# Patient Record
Sex: MALE | Race: WHITE | NOT HISPANIC OR LATINO | Employment: OTHER | URBAN - METROPOLITAN AREA
[De-identification: names, ages, dates, MRNs, and addresses within clinical notes are randomized per-mention and may not be internally consistent; named-entity substitution may affect disease eponyms.]

---

## 2017-01-18 ENCOUNTER — OFFICE VISIT (OUTPATIENT)
Dept: UROLOGY | Facility: CLINIC | Age: 67
End: 2017-01-18
Payer: MEDICARE

## 2017-01-18 VITALS — HEART RATE: 75 BPM | SYSTOLIC BLOOD PRESSURE: 147 MMHG | DIASTOLIC BLOOD PRESSURE: 82 MMHG

## 2017-01-18 DIAGNOSIS — N40.0 BENIGN PROSTATIC HYPERPLASIA, PRESENCE OF LOWER URINARY TRACT SYMPTOMS UNSPECIFIED, UNSPECIFIED MORPHOLOGY: ICD-10-CM

## 2017-01-18 DIAGNOSIS — N52.39 POST-PROCEDURAL ERECTILE DYSFUNCTION, UNSPECIFIED TYPE: ICD-10-CM

## 2017-01-18 DIAGNOSIS — C64.1 RENAL CELL CARCINOMA, RIGHT: ICD-10-CM

## 2017-01-18 DIAGNOSIS — R97.20 ELEVATED PROSTATE SPECIFIC ANTIGEN (PSA): Primary | ICD-10-CM

## 2017-01-18 DIAGNOSIS — N52.9 ERECTILE DYSFUNCTION, UNSPECIFIED ERECTILE DYSFUNCTION TYPE: ICD-10-CM

## 2017-01-18 LAB
BILIRUB SERPL-MCNC: NORMAL MG/DL
BLOOD URINE, POC: NORMAL
COLOR, POC UA: YELLOW
GLUCOSE UR QL STRIP: NORMAL
KETONES UR QL STRIP: NORMAL
LEUKOCYTE ESTERASE URINE, POC: NORMAL
NITRITE, POC UA: NORMAL
PH, POC UA: 5
PROTEIN, POC: NORMAL
SPECIFIC GRAVITY, POC UA: 1.02
UROBILINOGEN, POC UA: NORMAL

## 2017-01-18 PROCEDURE — 81002 URINALYSIS NONAUTO W/O SCOPE: CPT | Mod: S$GLB,,, | Performed by: UROLOGY

## 2017-01-18 PROCEDURE — 1160F RVW MEDS BY RX/DR IN RCRD: CPT | Mod: S$GLB,,, | Performed by: UROLOGY

## 2017-01-18 PROCEDURE — 99214 OFFICE O/P EST MOD 30 MIN: CPT | Mod: 25,S$GLB,, | Performed by: UROLOGY

## 2017-01-18 PROCEDURE — 1159F MED LIST DOCD IN RCRD: CPT | Mod: S$GLB,,, | Performed by: UROLOGY

## 2017-01-18 PROCEDURE — 99999 PR PBB SHADOW E&M-EST. PATIENT-LVL III: CPT | Mod: PBBFAC,,, | Performed by: UROLOGY

## 2017-01-18 PROCEDURE — 1157F ADVNC CARE PLAN IN RCRD: CPT | Mod: S$GLB,,, | Performed by: UROLOGY

## 2017-01-18 PROCEDURE — 1126F AMNT PAIN NOTED NONE PRSNT: CPT | Mod: S$GLB,,, | Performed by: UROLOGY

## 2017-01-18 RX ORDER — SILDENAFIL CITRATE 20 MG/1
20 TABLET ORAL AS DIRECTED
Qty: 30 TABLET | Refills: 6 | Status: SHIPPED | OUTPATIENT
Start: 2017-01-18 | End: 2019-01-01

## 2017-01-18 RX ORDER — HYDROCODONE BITARTRATE AND ACETAMINOPHEN 7.5; 325 MG/1; MG/1
TABLET ORAL
COMMUNITY
Start: 2016-12-20 | End: 2018-01-09 | Stop reason: SDUPTHER

## 2017-01-18 RX ORDER — BETAMETHASONE DIPROPIONATE 0.5 MG/G
CREAM TOPICAL
Refills: 0 | COMMUNITY
Start: 2016-11-04 | End: 2019-01-01

## 2017-01-18 NOTE — MR AVS SNAPSHOT
[facility]  [address], [address]  [place] [address]  Phone: [phone]                  Raymundo Richard   2017 9:30 AM   Office Visit    Description:  Male : 1950   Provider:  Ne Green MD   Department:  [facility]           Reason for Visit     Elevated PSA           Diagnoses this Visit        Comments    Elevated prostate specific antigen (PSA)    -  Primary     Renal cell carcinoma, right         Solitary kidney                To Do List           Future Appointments        Provider Department Dept Phone    2017 12:00 PM [facility] CT2 LIMIT 500 LBS [facility] [phone]      Goals (5 Years of Data)     None       These Medications        Disp Refills Start End    sildenafil (REVATIO) 20 mg Tab 30 tablet 6 2017    Take 1 tablet (20 mg total) by mouth as directed. - Oral    Pharmacy: [facility]/pharmacy #[id number] - [place] - [address] Ph #: [phone]         [facility]     [facility] On Call Nurse Care Line -  Assistance  Registered nurses in the [facility] provide clinical advisement, health education, appointment booking, and other advisory services.  Call for this free service at [phone].             Medications           Message regarding Medications     Verify the changes and/or additions to your medication regime listed below are the same as discussed with your clinician today.  If any of these changes or additions are incorrect, please notify your healthcare provider.        START taking these NEW medications        Refills    sildenafil (REVATIO) 20 mg Tab 6    Sig: Take 1 tablet (20 mg total) by mouth as directed.    Class: Print    Route: Oral           Verify that the below list of medications is an accurate representation of the medications you are currently taking.  If none reported, the list may be blank. If incorrect, please contact your healthcare provider. Carry this  list with you in case of emergency.           Current Medications     betamethasone dipropionate (DIPROLENE) 0.05 % cream APPLY TO HANDS TWICE A DAY AS NEEDED    hydrocodone-acetaminophen 7.5-325mg (NORCO) 7.5-325 mg per tablet     pantoprazole (PROTONIX) 40 MG tablet     tamsulosin (FLOMAX) 0.4 mg Cp24 TAKE 1 CAPSULE (0.4 MG TOTAL) BY MOUTH ONCE DAILY.    sildenafil (REVATIO) 20 mg Tab Take 1 tablet (20 mg total) by mouth as directed.           Clinical Reference Information           Vital Signs - Last Recorded  Most recent update: 1/18/2017  9:30 AM by Josy Coyne MA    BP Pulse                (!) 147/82 75          Blood Pressure          Most Recent Value    BP  (!)  147/82      Allergies as of 1/18/2017     Other      Immunizations Administered on Date of Encounter - 1/18/2017     None      Orders Placed During Today's Visit      Normal Orders This Visit    POCT URINE DIPSTICK WITHOUT MICROSCOPE     Future Labs/Procedures Expected by Expires    CBC auto differential  1/18/2017 3/19/2018    Comprehensive metabolic panel  1/18/2017 3/19/2018    CT Abdomen Pelvis W Wo Contrast  1/18/2017 1/18/2018    PSA, total and free  1/18/2017 3/19/2018         1/18/2017 10:24 AM - Josy Coyne MA      Component Results     Component    Color, UA    yellow    Spec Grav, UA    1.020    pH, UA    5.0    WBC, UA    neg    Nitrite, UA    neg    Protein, UA    neg    Glucose, UA    neg    Ketones, UA    neg    Urobilinogen, UA    neg    Bilirubin, UA    neg    Blood, UA    neg

## 2017-01-18 NOTE — PROGRESS NOTES
OFFICE NOTE     CHIEF COMPLAINT:  Renal cell carcinoma, BPH, elevated PSA, abnormal imaging of   the bladder.    HISTORY OF PRESENT ILLNESS:  This 66-year-old male returns for routine recheck.    He had undergone cystoscopy on 09/13/2016 when he   was found to have some thickening of the bladder wall on the CT scan.    Cystoscopy did reveal trilobar prostatic hyperplasia, but no evidence of any   bladder lesions.  The patient also has a history of BPH for which he continues   to take Flomax 0.4 mg p.o. daily and he also has a history of renal cell   carcinoma for which he underwent a right radical nephrectomy in August 2013.    Followup evaluations so far have shown no evidence of any tumor recurrence.  He   also has a history of elevated PSA for which he had undergone a prostate   ultrasound with biopsy in 2012 and was found to be no evidence of malignancy and   he did undergo an MRI of the prostate that showed a low probability of   developing prostatic carcinoma.  His most recent PSA was 8.7 on 06/10/2016 with   a free PSA of 22%.  The patient also is being treated for erectile dysfunction,   for which he had been taking Viagra in the past, but he is requesting Revatio as   this is less costly and is affordable for him.    MEDICAL HISTORY UPDATE:  Reveals no change in his general health since his last   visit.    PHYSICAL EXAMINATION:  ABDOMEN:  Soft, benign and nontender.  No masses.  Well-healed right upper   quadrant surgical scar.  EXTERNAL GENITAL:  Normal phallus with adequate meatus.  Testes descended and   feel normal.  No scrotal masses.  RECTAL:  Reveals a 30 g smooth prostate.  No nodules.  Normal sphincter tone.    UA negative with pH 5.0.    FINAL IMPRESSION:  Renal cell carcinoma status post right radical nephrectomy   with solitary left kidney, benign prostatic hypertrophy, elevated PSA, erectile   dysfunction.    RECOMMENDATIONS:  CT scan of abdomen and pelvis, CMP, CBC, PSA, free and total.     Continue with Flomax 0.4 mg p.o. daily and Revatio 20 mg for management of his   erectile dysfunction and recommend that he take anywhere up to five tablets at a   time, which will be equivalent 100 mg of Viagra.  He stated he understood and   agreed and we will contact him with the imaging and lab results.      KHUSHI  dd: 01/18/2017 16:47:53 (CST)  td: 01/19/2017 01:28:31 (CST)  Doc ID   #[id number]  Job ID #[id number]    CC:

## 2017-01-19 ENCOUNTER — LAB VISIT (OUTPATIENT)
Dept: LAB | Facility: HOSPITAL | Age: 67
End: 2017-01-19
Attending: UROLOGY
Payer: MEDICARE

## 2017-01-19 DIAGNOSIS — C64.1 RENAL CELL CARCINOMA, RIGHT: ICD-10-CM

## 2017-01-19 DIAGNOSIS — R97.20 ELEVATED PROSTATE SPECIFIC ANTIGEN (PSA): ICD-10-CM

## 2017-01-19 LAB
ALBUMIN SERPL BCP-MCNC: 3.9 G/DL
ALP SERPL-CCNC: 55 U/L
ALT SERPL W/O P-5'-P-CCNC: 26 U/L
ANION GAP SERPL CALC-SCNC: 9 MMOL/L
AST SERPL-CCNC: 24 U/L
BASOPHILS # BLD AUTO: 0 K/UL
BASOPHILS NFR BLD: 0.7 %
BILIRUB SERPL-MCNC: 0.8 MG/DL
BUN SERPL-MCNC: 23 MG/DL
CALCIUM SERPL-MCNC: 9.6 MG/DL
CHLORIDE SERPL-SCNC: 107 MMOL/L
CO2 SERPL-SCNC: 23 MMOL/L
CREAT SERPL-MCNC: 1.3 MG/DL
DIFFERENTIAL METHOD: ABNORMAL
EOSINOPHIL # BLD AUTO: 0.3 K/UL
EOSINOPHIL NFR BLD: 4 %
ERYTHROCYTE [DISTWIDTH] IN BLOOD BY AUTOMATED COUNT: 13 %
EST. GFR  (AFRICAN AMERICAN): >60 ML/MIN/1.73 M^2
EST. GFR  (NON AFRICAN AMERICAN): 57 ML/MIN/1.73 M^2
GLUCOSE SERPL-MCNC: 82 MG/DL
HCT VFR BLD AUTO: 44.5 %
HGB BLD-MCNC: 15 G/DL
LYMPHOCYTES # BLD AUTO: 2.4 K/UL
LYMPHOCYTES NFR BLD: 36 %
MCH RBC QN AUTO: 29.8 PG
MCHC RBC AUTO-ENTMCNC: 33.7 %
MCV RBC AUTO: 89 FL
MONOCYTES # BLD AUTO: 0.7 K/UL
MONOCYTES NFR BLD: 10.4 %
NEUTROPHILS # BLD AUTO: 3.2 K/UL
NEUTROPHILS NFR BLD: 48.9 %
PLATELET # BLD AUTO: 218 K/UL
PMV BLD AUTO: 7.9 FL
POTASSIUM SERPL-SCNC: 4.3 MMOL/L
PROSTATE SPECIFIC ANTIGEN, TOTAL: 9.3 NG/ML
PROT SERPL-MCNC: 7.3 G/DL
PSA FREE MFR SERPL: 22.9 %
PSA FREE SERPL-MCNC: 2.13 NG/ML
RBC # BLD AUTO: 5.03 M/UL
SODIUM SERPL-SCNC: 139 MMOL/L
WBC # BLD AUTO: 6.6 K/UL

## 2017-01-19 PROCEDURE — 80053 COMPREHEN METABOLIC PANEL: CPT

## 2017-01-19 PROCEDURE — 84153 ASSAY OF PSA TOTAL: CPT

## 2017-01-19 PROCEDURE — 36415 COLL VENOUS BLD VENIPUNCTURE: CPT

## 2017-01-19 PROCEDURE — 85025 COMPLETE CBC W/AUTO DIFF WBC: CPT

## 2017-01-23 ENCOUNTER — HOSPITAL ENCOUNTER (OUTPATIENT)
Dept: RADIOLOGY | Facility: HOSPITAL | Age: 67
Discharge: HOME | End: 2017-01-23
Attending: NURSE PRACTITIONER
Payer: MEDICARE

## 2017-01-23 ENCOUNTER — HOSPITAL ENCOUNTER (OUTPATIENT)
Dept: RADIOLOGY | Facility: HOSPITAL | Age: 67
Discharge: HOME | End: 2017-01-23
Attending: UROLOGY
Payer: MEDICARE

## 2017-01-23 ENCOUNTER — TELEPHONE (OUTPATIENT)
Dept: UROLOGY | Facility: CLINIC | Age: 67
End: 2017-01-23

## 2017-01-23 DIAGNOSIS — M25.562 ACUTE PAIN OF LEFT KNEE: Primary | ICD-10-CM

## 2017-01-23 DIAGNOSIS — M25.562 ACUTE PAIN OF LEFT KNEE: ICD-10-CM

## 2017-01-23 DIAGNOSIS — C64.1 RENAL CELL CARCINOMA, RIGHT: ICD-10-CM

## 2017-01-23 DIAGNOSIS — R97.20 ELEVATED PROSTATE SPECIFIC ANTIGEN (PSA): ICD-10-CM

## 2017-01-23 PROCEDURE — 73560 X-RAY EXAM OF KNEE 1 OR 2: CPT | Mod: TC,LT

## 2017-01-23 PROCEDURE — 25500020 PHARM REV CODE 255

## 2017-01-23 PROCEDURE — 25500020 PHARM REV CODE 255: Performed by: UROLOGY

## 2017-01-23 PROCEDURE — 74178 CT ABD&PLV WO CNTR FLWD CNTR: CPT | Mod: 26,,, | Performed by: RADIOLOGY

## 2017-01-23 PROCEDURE — 74178 CT ABD&PLV WO CNTR FLWD CNTR: CPT | Mod: TC

## 2017-01-23 PROCEDURE — 73560 X-RAY EXAM OF KNEE 1 OR 2: CPT | Mod: 26,LT,, | Performed by: RADIOLOGY

## 2017-01-23 RX ADMIN — IOHEXOL 75 ML: 350 INJECTION, SOLUTION INTRAVENOUS at 01:01

## 2017-01-23 RX ADMIN — IOHEXOL 30 ML: 350 INJECTION, SOLUTION INTRAVENOUS at 02:01

## 2017-01-23 NOTE — TELEPHONE ENCOUNTER
----- Message from LUIS CARLOS Bolden sent at 1/23/2017  2:44 PM CST -----  Please call and inform patient small joint effusion and mild degenerative changes

## 2017-01-25 DIAGNOSIS — R91.1 LUNG NODULE: Primary | ICD-10-CM

## 2017-01-26 ENCOUNTER — HOSPITAL ENCOUNTER (OUTPATIENT)
Dept: RADIOLOGY | Facility: HOSPITAL | Age: 67
Discharge: HOME | End: 2017-01-26
Attending: UROLOGY
Payer: MEDICARE

## 2017-01-26 ENCOUNTER — TELEPHONE (OUTPATIENT)
Dept: UROLOGY | Facility: CLINIC | Age: 67
End: 2017-01-26

## 2017-01-26 DIAGNOSIS — R91.1 LUNG NODULE: ICD-10-CM

## 2017-01-26 PROCEDURE — 71250 CT THORAX DX C-: CPT | Mod: TC

## 2017-01-26 PROCEDURE — 71250 CT THORAX DX C-: CPT | Mod: 26,,, | Performed by: RADIOLOGY

## 2017-01-26 NOTE — TELEPHONE ENCOUNTER
----- Message from Cristina Mcdermott sent at 1/26/2017  2:05 PM CST -----  Contact: VERENICE FROM DR. Lara office [phone]  Verenice called  From Dr. Lara office and asked for the office notes from the visit yesterday and his PSA levels as well  Fax to [phone]

## 2017-05-18 ENCOUNTER — TELEPHONE (OUTPATIENT)
Dept: UROLOGY | Facility: CLINIC | Age: 67
End: 2017-05-18

## 2017-05-18 NOTE — TELEPHONE ENCOUNTER
----- Message from Mary Gonzales sent at 5/18/2017 11:23 AM CDT -----  Dr. Rizvi office called to speak to Dr. Arvizu's office. Nurse answered the phone but said it should have been for Dr. Green. She took the call at that time so I did not get the chance to get the phone number or reason for the call.

## 2017-07-13 ENCOUNTER — TELEPHONE (OUTPATIENT)
Dept: UROLOGY | Facility: CLINIC | Age: 67
End: 2017-07-13

## 2017-07-13 ENCOUNTER — OFFICE VISIT (OUTPATIENT)
Dept: UROLOGY | Facility: CLINIC | Age: 67
End: 2017-07-13
Payer: MEDICARE

## 2017-07-13 ENCOUNTER — LAB VISIT (OUTPATIENT)
Dept: LAB | Facility: HOSPITAL | Age: 67
End: 2017-07-13
Attending: UROLOGY
Payer: MEDICARE

## 2017-07-13 VITALS
WEIGHT: 166.44 LBS | BODY MASS INDEX: 26.75 KG/M2 | TEMPERATURE: 98 F | DIASTOLIC BLOOD PRESSURE: 83 MMHG | RESPIRATION RATE: 18 BRPM | HEIGHT: 66 IN | HEART RATE: 70 BPM | SYSTOLIC BLOOD PRESSURE: 136 MMHG

## 2017-07-13 DIAGNOSIS — C64.1 RENAL CANCER, RIGHT: ICD-10-CM

## 2017-07-13 DIAGNOSIS — N52.9 ERECTILE DYSFUNCTION, UNSPECIFIED ERECTILE DYSFUNCTION TYPE: ICD-10-CM

## 2017-07-13 DIAGNOSIS — R97.20 ELEVATED PROSTATE SPECIFIC ANTIGEN (PSA): ICD-10-CM

## 2017-07-13 DIAGNOSIS — R97.20 ELEVATED PROSTATE SPECIFIC ANTIGEN (PSA): Primary | ICD-10-CM

## 2017-07-13 DIAGNOSIS — N40.1 BENIGN NON-NODULAR PROSTATIC HYPERPLASIA WITH LOWER URINARY TRACT SYMPTOMS: ICD-10-CM

## 2017-07-13 LAB
ALBUMIN SERPL BCP-MCNC: 4.1 G/DL
ALP SERPL-CCNC: 64 U/L
ALT SERPL W/O P-5'-P-CCNC: 14 U/L
ANION GAP SERPL CALC-SCNC: 10 MMOL/L
AST SERPL-CCNC: 23 U/L
BASOPHILS # BLD AUTO: 0 K/UL
BASOPHILS NFR BLD: 0.4 %
BILIRUB SERPL-MCNC: 0.9 MG/DL
BILIRUB SERPL-MCNC: NORMAL MG/DL
BLOOD URINE, POC: NORMAL
BUN SERPL-MCNC: 15 MG/DL
CALCIUM SERPL-MCNC: 9.9 MG/DL
CHLORIDE SERPL-SCNC: 106 MMOL/L
CO2 SERPL-SCNC: 24 MMOL/L
COLOR, POC UA: YELLOW
COMPLEXED PSA SERPL-MCNC: 15.4 NG/ML
CREAT SERPL-MCNC: 1.3 MG/DL
DIFFERENTIAL METHOD: ABNORMAL
EOSINOPHIL # BLD AUTO: 0.2 K/UL
EOSINOPHIL NFR BLD: 2.7 %
ERYTHROCYTE [DISTWIDTH] IN BLOOD BY AUTOMATED COUNT: 13.3 %
EST. GFR  (AFRICAN AMERICAN): >60 ML/MIN/1.73 M^2
EST. GFR  (NON AFRICAN AMERICAN): 56 ML/MIN/1.73 M^2
GLUCOSE SERPL-MCNC: 95 MG/DL
GLUCOSE UR QL STRIP: NORMAL
HCT VFR BLD AUTO: 47.1 %
HGB BLD-MCNC: 15.9 G/DL
KETONES UR QL STRIP: NORMAL
LEUKOCYTE ESTERASE URINE, POC: NORMAL
LYMPHOCYTES # BLD AUTO: 2.3 K/UL
LYMPHOCYTES NFR BLD: 30.3 %
MCH RBC QN AUTO: 29.8 PG
MCHC RBC AUTO-ENTMCNC: 33.7 %
MCV RBC AUTO: 88 FL
MONOCYTES # BLD AUTO: 0.6 K/UL
MONOCYTES NFR BLD: 7.6 %
NEUTROPHILS # BLD AUTO: 4.5 K/UL
NEUTROPHILS NFR BLD: 59 %
NITRITE, POC UA: NORMAL
PH, POC UA: 6
PLATELET # BLD AUTO: 240 K/UL
PMV BLD AUTO: 8 FL
POTASSIUM SERPL-SCNC: 4.4 MMOL/L
PROT SERPL-MCNC: 8 G/DL
PROTEIN, POC: NORMAL
RBC # BLD AUTO: 5.33 M/UL
SODIUM SERPL-SCNC: 140 MMOL/L
SPECIFIC GRAVITY, POC UA: 1.01
UROBILINOGEN, POC UA: NORMAL
WBC # BLD AUTO: 7.6 K/UL

## 2017-07-13 PROCEDURE — 36415 COLL VENOUS BLD VENIPUNCTURE: CPT

## 2017-07-13 PROCEDURE — 1159F MED LIST DOCD IN RCRD: CPT | Mod: S$GLB,,, | Performed by: UROLOGY

## 2017-07-13 PROCEDURE — 1126F AMNT PAIN NOTED NONE PRSNT: CPT | Mod: S$GLB,,, | Performed by: UROLOGY

## 2017-07-13 PROCEDURE — 99999 PR PBB SHADOW E&M-EST. PATIENT-LVL IV: CPT | Mod: PBBFAC,,, | Performed by: UROLOGY

## 2017-07-13 PROCEDURE — 99214 OFFICE O/P EST MOD 30 MIN: CPT | Mod: 25,S$GLB,, | Performed by: UROLOGY

## 2017-07-13 PROCEDURE — 81002 URINALYSIS NONAUTO W/O SCOPE: CPT | Mod: S$GLB,,, | Performed by: UROLOGY

## 2017-07-13 PROCEDURE — 84153 ASSAY OF PSA TOTAL: CPT

## 2017-07-13 PROCEDURE — 85025 COMPLETE CBC W/AUTO DIFF WBC: CPT

## 2017-07-13 PROCEDURE — 80053 COMPREHEN METABOLIC PANEL: CPT

## 2017-07-13 NOTE — TELEPHONE ENCOUNTER
----- Message from Yamile Frazier sent at 7/13/2017  2:04 PM CDT -----  Contact: pt  Pt returning your call..[phone] (gehd)

## 2017-07-13 NOTE — TELEPHONE ENCOUNTER
----- Message from Michelle Avilez LPN sent at 7/13/2017  1:34 PM CDT -----  Contact: pt  Pt requesting that you return call to him . Please return call   ----- Message -----  From: Elder Sandy  Sent: 7/13/2017  12:14 PM  To: Norma Oilvia Staff    Pt is requesting a callback about his prescription   Call Back#[phone]  Thanks

## 2017-07-13 NOTE — TELEPHONE ENCOUNTER
----- Message from Olivia Almonte sent at 7/13/2017  1:52 PM CDT -----  Contact: self  Placed call to pod, patient miss call from your office please call back at [phone] (sfos)

## 2017-07-13 NOTE — TELEPHONE ENCOUNTER
I spoke with the pt and he stated the Austin is too expensive and he would like something else. His friend told him about an injectable medication of which he is going to call back tomorrow with the name. I advised him that would be fine.

## 2017-07-16 NOTE — PROGRESS NOTES
OFFICE NOTE    CHIEF COMPLAINT:  Renal cell carcinoma, status post right radical nephrectomy,   solitary left kidney, BPH, and history of elevated PSA.    HISTORY OF PRESENT ILLNESS:  This 67-year-old male returns for routine recheck.    He has a history of renal cell carcinoma for which he underwent a right radical   nephrectomy in 2013 and has shown no evidence of recurrence since then.  He   also has a history of elevated PSA for which he has undergone a prostate   ultrasound biopsy in 2012 and found to have no evidence of any malignancy and   subsequent MRI of the prostate showed a low probability of him developing a   prostate cancer.  The patient had undergone evaluation with cystoscopy on   09/13/2016 since on the CT scan, there was found to be some thickening of the   bladder wall, but the cystoscopic examination on 09/13/2016 revealed trilobar   prostatic hyperplasia and no evidence of any bladder lesions.  In terms of his   BPH, he continues to take with Flomax 0.4 mg p.o. daily.  In terms of his   erectile dysfunction, he states that the Revatio is no longer effective, he is   not on any medication at this time, we did discuss other treatment options, and he   is not on any PDE5 inhibitors at this time.    MEDICAL HISTORY UPDATE:  Reveals that he is being followed by Dr. Lara for his   pulmonary nodule.    PHYSICAL EXAMINATION:  ABDOMEN:  Soft, benign and nontender.  No masses.  No hernias or organomegaly.    Well-healed right upper quadrant surgical scar.  EXTERNAL GENITAL:  Normal phallus with adequate meatus.  Testes descended and   feel normal.  No scrotal masses.  RECTAL:  Reveals 30 g smooth prostate.  No nodules.  Normal sphincter tone.    UA negative with pH 6.0.    His last PSA was 9.3 on 01/19/2017.    FINAL IMPRESSION:  Right renal cell carcinoma with status post right radical   nephrectomy, solitary left kidney, BPH, erectile dysfunction and history of   elevated PSA.    RECOMMENDATIONS:  We  will obtain a CT scan of the chest, abdomen and pelvis,   CBC, CMP and also PSA.  He will otherwise continue with the Flomax.    In terms of further management of the erectile dysfunction, further treatment   options were discussed with the patient and the patient decided that he would   want to try the MUSE.  He was instructed in administration of the MUSE pellet   and we in fact did try in the office today with a 250 mcg dosage and after   observation following 10 to 15 minutes after administering the treatment, there   was noted to be approximately 60% rigidity and it was discussed with the patient   that he may want to try the higher dosage at home and notify us of the response   and he was also informed to be aware of the possibility of prolonged erections   resulting in priapism, for which he will need emergency treatment if he were to   develop it and he stated he understood and agreed and he was given literature   information concerning this.  We will contact him with the results of the lab   work and the imaging studies and will notify us of the response to the MUSE.      GUY  dd: 07/16/2017 15:02:25 (CDT)  td: 07/16/2017 23:53:10 (LIANT)  Doc ID   #[id number]  Job ID #[id number]    CC:

## 2017-07-18 ENCOUNTER — HOSPITAL ENCOUNTER (OUTPATIENT)
Dept: RADIOLOGY | Facility: HOSPITAL | Age: 67
Discharge: HOME | End: 2017-07-18
Attending: UROLOGY
Payer: MEDICARE

## 2017-07-18 DIAGNOSIS — R97.20 ELEVATED PROSTATE SPECIFIC ANTIGEN (PSA): ICD-10-CM

## 2017-07-18 DIAGNOSIS — C64.1 RENAL CANCER, RIGHT: ICD-10-CM

## 2017-07-18 PROCEDURE — 74176 CT ABD & PELVIS W/O CONTRAST: CPT | Mod: TC

## 2017-07-18 PROCEDURE — 74176 CT ABD & PELVIS W/O CONTRAST: CPT | Mod: 26,,, | Performed by: RADIOLOGY

## 2017-07-18 PROCEDURE — 25500020 PHARM REV CODE 255

## 2017-07-18 PROCEDURE — 71250 CT THORAX DX C-: CPT | Mod: 26,,, | Performed by: RADIOLOGY

## 2017-07-18 PROCEDURE — 71250 CT THORAX DX C-: CPT | Mod: TC

## 2017-07-18 RX ADMIN — IOHEXOL 30 ML: 350 INJECTION, SOLUTION INTRAVENOUS at 08:07

## 2017-07-18 NOTE — TELEPHONE ENCOUNTER
----- Message from Nickolas Peters sent at 7/18/2017  3:17 PM CDT -----  Contact: self- [phone]  Patient states he returning the nurse phone call.Thanks!

## 2017-07-19 ENCOUNTER — TELEPHONE (OUTPATIENT)
Dept: UROLOGY | Facility: CLINIC | Age: 67
End: 2017-07-19

## 2017-07-19 DIAGNOSIS — R97.20 ELEVATED PROSTATE SPECIFIC ANTIGEN (PSA): Primary | ICD-10-CM

## 2017-07-19 NOTE — TELEPHONE ENCOUNTER
Patient states he wants to speak with Josy about a prescription that was supposed to be sent to pharmacy.

## 2017-07-19 NOTE — TELEPHONE ENCOUNTER
----- Message from Lois Woodard sent at 7/19/2017 11:31 AM CDT -----  Contact: Patient  Patient returning call. Please call back at [phone]. Thanks,

## 2017-07-20 NOTE — TELEPHONE ENCOUNTER
----- Message from Dara Briggs sent at 7/20/2017  8:11 AM CDT -----  Contact: self  Patient would like to speak to Josy again   He has questions regarding his medication and PSA    Please call      Thanks

## 2017-07-20 NOTE — TELEPHONE ENCOUNTER
The pt was advised the Trimix has been sent to his pharmacy and please call back if the recommended dosage doesn't work. He verbalized understanding. Please  Sign orders for repeat PSA.

## 2017-07-20 NOTE — TELEPHONE ENCOUNTER
I spoke with the pt and he states he would like to take Prostate 2.4 in view of the elevated PSA. Please advise.

## 2017-07-25 DIAGNOSIS — R97.20 ELEVATED PSA: ICD-10-CM

## 2017-07-25 DIAGNOSIS — N41.9 PROSTATITIS, UNSPECIFIED PROSTATITIS TYPE: Primary | ICD-10-CM

## 2017-07-25 RX ORDER — SULFAMETHOXAZOLE AND TRIMETHOPRIM 800; 160 MG/1; MG/1
1 TABLET ORAL 2 TIMES DAILY
Qty: 42 TABLET | Refills: 0 | Status: SHIPPED | OUTPATIENT
Start: 2017-07-25 | End: 2017-11-02 | Stop reason: ALTCHOICE

## 2017-07-25 NOTE — TELEPHONE ENCOUNTER
Patient was advised last week that his PSA was elevated.  At that time he had no symptoms. Patient now reports that he is having some trouble urinating and dysuria.    He would like to have antibiotics sent to [facility] on [address].    Please advised.

## 2017-07-25 NOTE — TELEPHONE ENCOUNTER
----- Message from Cristina Mcdermott sent at 7/25/2017  1:43 PM CDT -----  Contact: pt [phone]  Call placed to pod patient is returning the nurse call back.

## 2017-11-02 ENCOUNTER — OFFICE VISIT (OUTPATIENT)
Dept: UROLOGY | Facility: CLINIC | Age: 67
End: 2017-11-02
Payer: MEDICARE

## 2017-11-02 VITALS
SYSTOLIC BLOOD PRESSURE: 131 MMHG | RESPIRATION RATE: 18 BRPM | BODY MASS INDEX: 24.45 KG/M2 | TEMPERATURE: 98 F | DIASTOLIC BLOOD PRESSURE: 80 MMHG | HEIGHT: 66 IN | WEIGHT: 152.13 LBS | HEART RATE: 70 BPM

## 2017-11-02 DIAGNOSIS — R30.0 DYSURIA: Primary | ICD-10-CM

## 2017-11-02 DIAGNOSIS — R97.20 ELEVATED PROSTATE SPECIFIC ANTIGEN (PSA): ICD-10-CM

## 2017-11-02 DIAGNOSIS — R33.9 INCOMPLETE EMPTYING OF BLADDER: ICD-10-CM

## 2017-11-02 DIAGNOSIS — N40.0 BENIGN PROSTATIC HYPERPLASIA, UNSPECIFIED WHETHER LOWER URINARY TRACT SYMPTOMS PRESENT: ICD-10-CM

## 2017-11-02 DIAGNOSIS — N41.9 PROSTATITIS, UNSPECIFIED PROSTATITIS TYPE: ICD-10-CM

## 2017-11-02 DIAGNOSIS — C64.1 RENAL CELL CARCINOMA OF RIGHT KIDNEY: ICD-10-CM

## 2017-11-02 LAB
BILIRUB SERPL-MCNC: NORMAL MG/DL
BLOOD URINE, POC: NORMAL
COLOR, POC UA: YELLOW
GLUCOSE UR QL STRIP: NORMAL
KETONES UR QL STRIP: NORMAL
LEUKOCYTE ESTERASE URINE, POC: NORMAL
NITRITE, POC UA: NORMAL
PH, POC UA: 6
PROTEIN, POC: NORMAL
SPECIFIC GRAVITY, POC UA: 1.01
UROBILINOGEN, POC UA: NORMAL

## 2017-11-02 PROCEDURE — 51798 US URINE CAPACITY MEASURE: CPT | Mod: S$GLB,,, | Performed by: UROLOGY

## 2017-11-02 PROCEDURE — 99214 OFFICE O/P EST MOD 30 MIN: CPT | Mod: 25,S$GLB,, | Performed by: UROLOGY

## 2017-11-02 PROCEDURE — 99999 PR PBB SHADOW E&M-EST. PATIENT-LVL III: CPT | Mod: PBBFAC,,, | Performed by: UROLOGY

## 2017-11-02 PROCEDURE — 81002 URINALYSIS NONAUTO W/O SCOPE: CPT | Mod: S$GLB,,, | Performed by: UROLOGY

## 2017-11-02 RX ORDER — ALFUZOSIN HYDROCHLORIDE 10 MG/1
10 TABLET, EXTENDED RELEASE ORAL
Qty: 30 TABLET | Refills: 11 | Status: SHIPPED | OUTPATIENT
Start: 2017-11-02 | End: 2018-04-10 | Stop reason: ALTCHOICE

## 2017-11-02 RX ORDER — SULFAMETHOXAZOLE AND TRIMETHOPRIM 800; 160 MG/1; MG/1
1 TABLET ORAL 2 TIMES DAILY
Qty: 42 TABLET | Refills: 0 | Status: SHIPPED | OUTPATIENT
Start: 2017-11-02 | End: 2017-11-12

## 2017-11-02 NOTE — PROGRESS NOTES
OFFICE NOTE    CHIEF COMPLAINT:  Renal cell carcinoma status post right radical nephrectomy,   solitary left kidney, history of elevated PSA, BPH, and lower urinary tract   symptoms.    HISTORY OF PRESENT ILLNESS:  This 67-year-old male returns for routine recheck.    He has a history of renal cell carcinoma for which he underwent a right radical   nephrectomy in 2013, so far has shown no evidence of any recurrences.  He did   undergo a CT scan of the abdomen and pelvis, most recently on 07/19/2017, which   revealed some pulmonary nodules, although unchanged from prior study, there was   a solitary left kidney with a 1 mm nonobstructing renal stones and no evidence   of any metastatic disease, but there was also enlarged prostate with some   bladder wall thickening as was noted on the prior CT scan.  The patient had   undergone cystoscopy on 09/13/2016 in view of the thickening of the bladder wall   and the only finding was that of trilobar prostatic hyperplasia.  No evidence   of any bladder lesions.  The patient also has a history of elevated PSA for   which he has undergone prostate ultrasound and biopsy in 2012 with no evidence   of any malignancy.  A subsequent MRI of the prostate showed a low probability of   him developing prostatic carcinoma and is being continued to be managed   conservatively, although his most recent PSA had risen fairly considerably to   15.4 on 07/13/2017 compared to 7.6, a year prior.  The patient will continue to   take Flomax for management of his BPH with which overall he has been doing quite   well until the lower tract symptoms over the past several months of dysuria,   slowing of the stream, and fever had developed suggestive of possible   prostatitis.    OTHER MEDICAL HISTORY UPDATE:  Reveals that he did see Dr. Diop, pulmonologist   for pulmonary nodules and is being managed with observation.    PHYSICAL EXAMINATION:  ABDOMEN:  Soft, benign and nontender.  Well-healed right  upper quadrant surgical   scar.  Bulging suggestive of possible right inguinal hernia for which he will be   following up with his general surgeon.  EXTERNAL GENITAL:  Normal phallus with adequate meatus.  Testes descended and   feel normal.  No scrotal masses.  RECTAL:  Reveals a 25 to 30 g somewhat boggy prostate, although no nodules.    Normal sphincter tone.    Bladder scan revealed 109 mL of residual urine.    UA today was negative with pH 6.0.    FINAL IMPRESSION:  Renal cell carcinoma, status post right radical nephrectomy,   solitary left kidney, small left renal calculus, benign prostatic hypertrophy,   prostatitis, and incomplete bladder emptying.    RECOMMENDATIONS:  PSA for which he is due for recheck.  Trial on Uroxatral 10 mg   p.o. daily in place of Flomax and the patient also was placed on Bactrim DS   p.o. b.i.d. with routine recheck in one month to observe response to the   treatment.      KHUSHI  dd: 11/02/2017 17:00:50 (CDT)  td: 11/03/2017 02:53:54 (CDT)  Doc ID   #[id number]  Job ID #[id number]    CC:

## 2017-11-27 ENCOUNTER — LAB VISIT (OUTPATIENT)
Dept: LAB | Facility: HOSPITAL | Age: 67
End: 2017-11-27
Attending: UROLOGY
Payer: MEDICARE

## 2017-11-27 DIAGNOSIS — R97.20 ELEVATED PROSTATE SPECIFIC ANTIGEN (PSA): ICD-10-CM

## 2017-11-27 LAB — COMPLEXED PSA SERPL-MCNC: 7.7 NG/ML

## 2017-11-27 PROCEDURE — 84153 ASSAY OF PSA TOTAL: CPT

## 2017-11-27 PROCEDURE — 36415 COLL VENOUS BLD VENIPUNCTURE: CPT

## 2017-11-27 RX ORDER — TAMSULOSIN HYDROCHLORIDE 0.4 MG/1
CAPSULE ORAL
Qty: 30 CAPSULE | Refills: 10 | Status: SHIPPED | OUTPATIENT
Start: 2017-11-27 | End: 2018-06-16 | Stop reason: SDUPTHER

## 2017-12-05 ENCOUNTER — OFFICE VISIT (OUTPATIENT)
Dept: UROLOGY | Facility: CLINIC | Age: 67
End: 2017-12-05
Payer: MEDICARE

## 2017-12-05 VITALS
DIASTOLIC BLOOD PRESSURE: 73 MMHG | SYSTOLIC BLOOD PRESSURE: 128 MMHG | TEMPERATURE: 98 F | WEIGHT: 149 LBS | HEART RATE: 67 BPM | BODY MASS INDEX: 24.83 KG/M2 | HEIGHT: 65 IN

## 2017-12-05 DIAGNOSIS — N40.0 BENIGN PROSTATIC HYPERPLASIA, UNSPECIFIED WHETHER LOWER URINARY TRACT SYMPTOMS PRESENT: ICD-10-CM

## 2017-12-05 DIAGNOSIS — C64.1 RENAL CELL CARCINOMA OF RIGHT KIDNEY: Primary | ICD-10-CM

## 2017-12-05 DIAGNOSIS — R97.20 ELEVATED PROSTATE SPECIFIC ANTIGEN (PSA): ICD-10-CM

## 2017-12-05 PROCEDURE — 99213 OFFICE O/P EST LOW 20 MIN: CPT | Mod: 25,S$GLB,, | Performed by: UROLOGY

## 2017-12-05 PROCEDURE — 81000 URINALYSIS NONAUTO W/SCOPE: CPT | Mod: S$GLB,,, | Performed by: UROLOGY

## 2017-12-05 PROCEDURE — 51798 US URINE CAPACITY MEASURE: CPT | Mod: S$GLB,,, | Performed by: UROLOGY

## 2017-12-05 PROCEDURE — 99999 PR PBB SHADOW E&M-EST. PATIENT-LVL III: CPT | Mod: PBBFAC,,, | Performed by: UROLOGY

## 2017-12-05 RX ORDER — CRANBERRY FRUIT EXTRACT 650 MG
1 CAPSULE ORAL 2 TIMES DAILY
Status: ON HOLD | COMMUNITY
End: 2020-01-01 | Stop reason: HOSPADM

## 2017-12-05 NOTE — PROGRESS NOTES
OFFICE NOTE    CHIEF COMPLAINT:  Renal cell carcinoma, BPH with lower urinary tract symptoms,   elevated PSA.    HISTORY OF PRESENT ILLNESS:  This 67-year-old male returns for routine recheck.    He has a history of renal cell carcinoma for which he underwent a right radical   nephrectomy in 2013 and has shown so far no evidence for any recurrence since   then.  His last CT scan was on 07/19/2017 which revealed no evidence of any   tumor recurrence.  In the solitary left kidney, there is a 1 mm nonobstructing   renal calculus, otherwise no other abnormal findings.  The patient also has a   history of BPH and lower urinary tract symptoms for which he had been taking   Flomax in the past and that his last visit on 11/02/2017, he was placed on a   trial of Uroxatral, but on today's visit, he states he did not notice any   improvement with the Uroxatral and has gone back to taking the Flomax again and   overall he feels his voiding status is quite stable, in fact is gradually   improving.  He also has a history of elevated PSA for which he had undergone a   prostate ultrasound and biopsy in 2012 and there was no evidence of any   malignancy and an MRI of the prostate revealed a low probability of prostate   carcinoma.  His most recent PSA was 7.7 on 11/27/2017 that compared to 15.4 on   07/13/2017.    MEDICAL HISTORY UPDATE:  Reveals essentially no change in his general health   since his last visit.  His pulmonary status is quite stable and he has been   followed for the pulmonary nodules.    PHYSICAL EXAMINATION:  As noted on the prior visit on 11/02/2017.    Bladder scan revealed 98 cc post void residual.    UA negative with pH 5.0.    FINAL IMPRESSION:  Renal cell carcinoma status post right radical nephrectomy,   solitary left kidney, history of elevated PSA, benign prostatic hypertrophy with   lower urinary tract symptoms.    RECOMMENDATIONS:  Continue with Flomax 0.4 mg p.o. daily.  Recheck in three   months with  followup CT scan and PSA.      MD/HANNAH  dd: 12/05/2017 17:43:32 (CST)  td: 12/06/2017 11:32:47 (CST)  Doc ID   #[id number]  Job ID #[id number]    CC:

## 2017-12-08 RX ORDER — SULFAMETHOXAZOLE AND TRIMETHOPRIM 800; 160 MG/1; MG/1
1 TABLET ORAL 2 TIMES DAILY
Qty: 28 TABLET | Refills: 0 | Status: SHIPPED | OUTPATIENT
Start: 2017-12-08 | End: 2017-12-22

## 2017-12-08 NOTE — TELEPHONE ENCOUNTER
Called and spoke with patient, he states that the MD told him that if he felt the UTI symptoms starting to flare up again, to call the office to have his antibiotic refilled. Antibiotic refill put in, patient verbally understood.

## 2017-12-08 NOTE — TELEPHONE ENCOUNTER
----- Message from Kenton De Jesus sent at 12/8/2017  9:33 AM CST -----  Contact: Pt  X_ 1st Request  _ 2nd Request  _ 3rd Request    Who:LUIS ALBERTO NOGUEIRA [[id number]]    Why: Patient would like to speak with staff in regards to a refill of antibiotics    What Number to Call Back: [phone]    When to Expect a call back: (Before the end of the day)  -- if call after 3:00 call back will be tomorrow.

## 2018-01-09 ENCOUNTER — OFFICE VISIT (OUTPATIENT)
Dept: GASTROENTEROLOGY | Facility: CLINIC | Age: 68
End: 2018-01-09
Payer: MEDICARE

## 2018-01-09 VITALS
TEMPERATURE: 98 F | DIASTOLIC BLOOD PRESSURE: 78 MMHG | BODY MASS INDEX: 25.99 KG/M2 | HEART RATE: 63 BPM | HEIGHT: 65 IN | RESPIRATION RATE: 16 BRPM | SYSTOLIC BLOOD PRESSURE: 110 MMHG | WEIGHT: 156 LBS

## 2018-01-09 DIAGNOSIS — K21.9 GASTROESOPHAGEAL REFLUX DISEASE, ESOPHAGITIS PRESENCE NOT SPECIFIED: ICD-10-CM

## 2018-01-09 DIAGNOSIS — N40.1 BPH ASSOCIATED WITH NOCTURIA: ICD-10-CM

## 2018-01-09 DIAGNOSIS — C64.9 RENAL CELL CARCINOMA, UNSPECIFIED LATERALITY: ICD-10-CM

## 2018-01-09 DIAGNOSIS — Z86.010 HX OF ADENOMATOUS COLONIC POLYPS: ICD-10-CM

## 2018-01-09 DIAGNOSIS — R35.1 BPH ASSOCIATED WITH NOCTURIA: ICD-10-CM

## 2018-01-09 DIAGNOSIS — B18.2 CHRONIC HEPATITIS C WITHOUT HEPATIC COMA: Primary | ICD-10-CM

## 2018-01-09 DIAGNOSIS — M54.9 BACK PAIN, UNSPECIFIED BACK LOCATION, UNSPECIFIED BACK PAIN LATERALITY, UNSPECIFIED CHRONICITY: ICD-10-CM

## 2018-01-09 PROCEDURE — 99215 OFFICE O/P EST HI 40 MIN: CPT | Mod: ,,, | Performed by: INTERNAL MEDICINE

## 2018-01-09 RX ORDER — POLYETHYLENE GLYCOL 3350, SODIUM SULFATE ANHYDROUS, SODIUM BICARBONATE, SODIUM CHLORIDE, POTASSIUM CHLORIDE 236; 22.74; 6.74; 5.86; 2.97 G/4L; G/4L; G/4L; G/4L; G/4L
4 POWDER, FOR SOLUTION ORAL ONCE
Qty: 1 BOTTLE | Refills: 0 | Status: SHIPPED | OUTPATIENT
Start: 2018-01-09 | End: 2018-01-09

## 2018-01-09 RX ORDER — HYDROCODONE BITARTRATE AND ACETAMINOPHEN 7.5; 325 MG/1; MG/1
1 TABLET ORAL EVERY 8 HOURS PRN
Qty: 90 TABLET | Refills: 0 | Status: SHIPPED | OUTPATIENT
Start: 2018-01-09 | End: 2018-02-07 | Stop reason: SDUPTHER

## 2018-01-09 NOTE — PROGRESS NOTES
[facility] Established Patient Visit    Subjective:           PCP: Primary Doctor No    Referring Provider: No ref. provider found    Chief Complaint: Hepatitis (follow up for meds)       HPI:  HPI  Raymundo Richard is a 67 y.o. male here for Follow-up of his hepatitisc treatment. Patient had  HCV RNA PCR Quant done last December and this was negative. Went through all of patient's symptoms including his neck and back problems. Patient is seen numerous orthopedic and neurosurgeons and is not a candidate for surgery at this time. His pain is being managed appropriately.                                                                           .                                                                                                                                                                                                                                                       ROS:   Constitutional: No fevers, chills, weight loss  ENT: No allergies, sore throat, rhinorrhea  CV: No chest pain, palpitations, edema  Pulm: No cough, shortness of breath, wheezing  Ophtho: No vision changes  GI: No blood in stools, change in bowel habits, nausea/vomiting  Denies alternating diarrhea/constipation.   Derm: No rash  Heme: No easy bruising or lymphadenopathy  MSK: No arthralgias or myalgias  : No dysuria, hematuria, frequency, polyuria, or flank tenderness  Endo: No hot or cold intolerance  Neuro: No syncope or seizure, or dizziness  Psych: No hallucination, depression or suicidal ideation      Medical History:  has a past medical history of GERD (gastroesophageal reflux disease) and Renal cell carcinoma.      Surgical History:  has a past surgical history that includes Hernia repair; Appendectomy; and Nephrectomy.    Family History: History reviewed. No pertinent family history.    Social History:   Social History   Substance Use Topics    Smoking status: Never Smoker    Smokeless tobacco: Not on file  "   Alcohol use No       The patient's social and family histories were reviewed by me and updated in the appropriate section of the electronic medical record.    Allergies:   Review of patient's allergies indicates:   Allergen Reactions    Other Nausea And Vomiting     SODIUM PENATHOL  CAUSES SEVERE N & V         Medications:   Current Outpatient Prescriptions   Medication Sig Dispense Refill    alfuzosin (UROXATRAL) 10 mg Tb24 Take 1 tablet (10 mg total) by mouth daily with breakfast. 30 tablet 11    alprostadil 500 mcg Supp Place 3 Pellet into the urethra nightly as needed. 3 each 3    betamethasone dipropionate (DIPROLENE) 0.05 % cream APPLY TO HANDS TWICE A DAY AS NEEDED  0    hydrocodone-acetaminophen 7.5-325mg (NORCO) 7.5-325 mg per tablet       pantoprazole (PROTONIX) 40 MG tablet       ranitidine (ZANTAC) 150 MG tablet Take 150 mg by mouth nightly.       saw palmetto frt/phytosterol 2 (PROSTATE SR) 160-250 mg Cap Take 1 capsule by mouth 2 (two) times daily.      sildenafil (REVATIO) 20 mg Tab Take 1 tablet (20 mg total) by mouth as directed. 30 tablet 6    tamsulosin (FLOMAX) 0.4 mg Cp24 TAKE 1 CAPSULE (0.4 MG TOTAL) BY MOUTH ONCE DAILY. 30 capsule 10     No current facility-administered medications for this visit.      All medications and past history have been reviewed.        Objective:        Vital Signs:  Blood pressure 110/78, pulse 63, temperature 98.1 °F (36.7 °C), temperature source Skin, resp. rate 16, height 5' 5" (1.651 m), weight 70.8 kg (156 lb). Body mass index is 25.96 kg/m².    Physical Exam:   General Appearance: Well appearing in no acute distress, well developed, well                 nourished  Head: Normocephalic, without obvious abnormality  Eyes:  Pupils equal, round and reactive to light  Throat: Lips, mucosa, and tongue normal; teeth and gums normal  Lungs: CTA bilaterally in anterior and posterior fields, no wheezes, no crackles  Heart:  Regular rate and rhythm, no " murmurs heard  Abdomen: Soft, non tender, non distended with positive bowel sounds in all four quadrants. No hepatosplenomegaly, ascites, or mass. Negative for succusion splash  : male   Extremities: No cyanosis, edema or deformity  Skin: No rash  Neurologic: Normal exam    Labs:  Lab Results   Component Value Date    WBC 7.60 07/13/2017    HGB 15.9 07/13/2017    HCT 47.1 07/13/2017     07/13/2017    ALT 14 07/13/2017    AST 23 07/13/2017     07/13/2017    K 4.4 07/13/2017     07/13/2017    CREATININE 1.3 07/13/2017    BUN 15 07/13/2017    CO2 24 07/13/2017    PSA 6.04 (H) 10/03/2012    INR 1.00 11/14/2012       Imaging:     All lab results and imaging results have been reviewed and discussed with the patient    Endoscopy:     Assessment/Plan:    Assessment:       No diagnosis found.    Plan:       There are no diagnoses linked to this encounter.      No Follow-up on file.    The plan was discussed with the patient and all questions/concerns have been answered to the patient's satisfaction.        Electronically signed by Gauri Sheets MD    This note was dictated using voice recognition software and may contain grammatical errors.

## 2018-02-07 DIAGNOSIS — M54.9 BACK PAIN, UNSPECIFIED BACK LOCATION, UNSPECIFIED BACK PAIN LATERALITY, UNSPECIFIED CHRONICITY: ICD-10-CM

## 2018-02-07 RX ORDER — HYDROCODONE BITARTRATE AND ACETAMINOPHEN 7.5; 325 MG/1; MG/1
1 TABLET ORAL EVERY 8 HOURS PRN
Qty: 90 TABLET | Refills: 0 | Status: SHIPPED | OUTPATIENT
Start: 2018-02-07 | End: 2018-03-06 | Stop reason: SDUPTHER

## 2018-02-07 NOTE — TELEPHONE ENCOUNTER
----- Message from Gina Salomon sent at 2/5/2018 11:23 AM CST -----  Contact: self  Pt is due for refill of his hydrocodone on 2/9/18.  Has appt on 2/19/18.

## 2018-03-06 DIAGNOSIS — M54.9 BACK PAIN, UNSPECIFIED BACK LOCATION, UNSPECIFIED BACK PAIN LATERALITY, UNSPECIFIED CHRONICITY: ICD-10-CM

## 2018-03-06 RX ORDER — HYDROCODONE BITARTRATE AND ACETAMINOPHEN 7.5; 325 MG/1; MG/1
1 TABLET ORAL EVERY 8 HOURS PRN
Qty: 90 TABLET | Refills: 0 | Status: SHIPPED | OUTPATIENT
Start: 2018-03-06 | End: 2018-04-04 | Stop reason: SDUPTHER

## 2018-03-06 NOTE — TELEPHONE ENCOUNTER
----- Message from Gina Salomon sent at 3/5/2018  3:23 PM CST -----  Contact: self  Pt is due for refill of hydrocodone in a couple of days.  Please call pt when ready.

## 2018-04-04 DIAGNOSIS — M54.9 BACK PAIN, UNSPECIFIED BACK LOCATION, UNSPECIFIED BACK PAIN LATERALITY, UNSPECIFIED CHRONICITY: ICD-10-CM

## 2018-04-04 RX ORDER — HYDROCODONE BITARTRATE AND ACETAMINOPHEN 7.5; 325 MG/1; MG/1
1 TABLET ORAL EVERY 8 HOURS PRN
Qty: 90 TABLET | Refills: 0 | Status: SHIPPED | OUTPATIENT
Start: 2018-04-04 | End: 2018-05-04

## 2018-04-04 NOTE — TELEPHONE ENCOUNTER
----- Message from Raquel Perdomo sent at 4/4/2018 10:49 AM CDT -----  Contact: Raymundo Gonzalez is requesting a refill of hydrocodone-acetaminophen 7.5-325mg ([facility])

## 2018-04-10 ENCOUNTER — OFFICE VISIT (OUTPATIENT)
Dept: UROLOGY | Facility: CLINIC | Age: 68
End: 2018-04-10
Payer: MEDICARE

## 2018-04-10 VITALS
BODY MASS INDEX: 25.71 KG/M2 | RESPIRATION RATE: 18 BRPM | HEART RATE: 76 BPM | HEIGHT: 65 IN | WEIGHT: 154.31 LBS | DIASTOLIC BLOOD PRESSURE: 68 MMHG | TEMPERATURE: 98 F | SYSTOLIC BLOOD PRESSURE: 130 MMHG

## 2018-04-10 DIAGNOSIS — R30.0 DYSURIA: ICD-10-CM

## 2018-04-10 DIAGNOSIS — N41.9 PROSTATITIS, UNSPECIFIED PROSTATITIS TYPE: ICD-10-CM

## 2018-04-10 DIAGNOSIS — R97.20 ELEVATED PROSTATE SPECIFIC ANTIGEN (PSA): ICD-10-CM

## 2018-04-10 DIAGNOSIS — N40.0 BENIGN PROSTATIC HYPERPLASIA, UNSPECIFIED WHETHER LOWER URINARY TRACT SYMPTOMS PRESENT: ICD-10-CM

## 2018-04-10 DIAGNOSIS — C64.1 RENAL CELL CARCINOMA OF RIGHT KIDNEY: Primary | ICD-10-CM

## 2018-04-10 LAB
BILIRUB SERPL-MCNC: ABNORMAL MG/DL
BLOOD URINE, POC: ABNORMAL
COLOR, POC UA: YELLOW
GLUCOSE UR QL STRIP: ABNORMAL
KETONES UR QL STRIP: ABNORMAL
LEUKOCYTE ESTERASE URINE, POC: ABNORMAL
NITRITE, POC UA: ABNORMAL
PH, POC UA: 5
PROTEIN, POC: ABNORMAL
SPECIFIC GRAVITY, POC UA: 1.01
UROBILINOGEN, POC UA: ABNORMAL

## 2018-04-10 PROCEDURE — 99214 OFFICE O/P EST MOD 30 MIN: CPT | Mod: 25,S$GLB,, | Performed by: UROLOGY

## 2018-04-10 PROCEDURE — 81002 URINALYSIS NONAUTO W/O SCOPE: CPT | Mod: S$GLB,,, | Performed by: UROLOGY

## 2018-04-10 PROCEDURE — 99999 PR PBB SHADOW E&M-EST. PATIENT-LVL III: CPT | Mod: PBBFAC,,, | Performed by: UROLOGY

## 2018-04-10 RX ORDER — SULFAMETHOXAZOLE AND TRIMETHOPRIM 800; 160 MG/1; MG/1
1 TABLET ORAL 2 TIMES DAILY
Qty: 30 TABLET | Refills: 0 | Status: SHIPPED | OUTPATIENT
Start: 2018-04-10 | End: 2018-04-20

## 2018-04-10 NOTE — PROGRESS NOTES
OFFICE NOTE    CHIEF COMPLAINT:  Renal cell carcinoma, status post right radical nephrectomy;   solitary left kidney; BPH; elevated PSA; erectile dysfunction.    HISTORY OF PRESENT ILLNESS:  This 67-year-old male returns for routine recheck.    He has a history of renal cell carcinoma for which he underwent a right radical   nephrectomy in 2013 and so far showed no evidence of any recurrences.  His last   CT scan on 07/19/2017 revealed no evidence of any tumor recurrence.  The   patient has a history of BPH for which he continues to take Flomax 0.4 mg p.o.   every day with which he is doing well.  He also has a history of elevated PSA   for which he has undergone a prostate ultrasound and biopsy in 2012 and there   was no evidence of any malignancy.  MRI of the prostate revealed a low   probability of prostatic carcinoma.  His last PSA was 7.7 on 11/27/2017.  He   also has a history of erectile dysfunction, for which he has been using TriMix   intracavernosal injections with which overall he has been doing quite well, but   he did mention that he has recently broke up with his girlfriend and thus has   not been sexually active.    MEDICAL HISTORY UPDATE:  Reveals no change in general health since his last   visit of 12/05/2017.    PHYSICAL EXAMINATION:  ABDOMEN:  Soft, benign.  Right inguinal bulge consistent with a right inguinal   hernia, essentially unchanged from prior examinations.  Otherwise, rest   examination unremarkable with a well-healed right upper quadrant scar from his   nephrectomy with no masses, no hernias.  EXTERNAL GENITAL:  Reveals normal phallus with adequate meatus.  Testes   descended and feel normal.  No scrotal masses.  RECTAL:  With enlarged at least 30-35 g, smooth, firm prostate.  No distinct   nodules.    His last PSA was 7.7 on 11/27/2017.    UA negative with pH 5.0.    The patient subsequently did mention he has been having problems with dysuria   and thinks he may have a prostatitis  and it was mentioned to him that there   could be possible some prostatitis to explain it in spite of the negative   urinalysis.    FINAL IMPRESSION:  Renal cell carcinoma, status post right radical nephrectomy;   solitary left kidney; BPH; elevated PSA; erectile dysfunction; possible   prostatitis.    RECOMMENDATIONS:  Trial of Bactrim DS p.o. b.i.d.  Continue Flomax 0.4 mg p.o.   daily.  We will also obtain CT scan of abdomen and pelvis, CMP, CBC and also PSA   for which he is due a recheck.  He also will continue to use TriMix   intracavernosal injection as needed, and we will contact him with the test   results.      KHUSHI  dd: 04/10/2018 13:33:53 (CDT)  td: 04/11/2018 05:21:56 (CDT)  Doc ID   #[id number]  Job ID #[id number]    CC:

## 2018-04-13 ENCOUNTER — HOSPITAL ENCOUNTER (OUTPATIENT)
Dept: RADIOLOGY | Facility: HOSPITAL | Age: 68
Discharge: HOME | End: 2018-04-13
Attending: UROLOGY
Payer: MEDICARE

## 2018-04-13 DIAGNOSIS — C64.1 RENAL CELL CARCINOMA OF RIGHT KIDNEY: ICD-10-CM

## 2018-04-13 PROCEDURE — 74176 CT ABD & PELVIS W/O CONTRAST: CPT | Mod: 26,,, | Performed by: RADIOLOGY

## 2018-04-13 PROCEDURE — 74176 CT ABD & PELVIS W/O CONTRAST: CPT | Mod: TC

## 2018-04-15 DIAGNOSIS — C64.1 RENAL CELL CARCINOMA OF RIGHT KIDNEY: ICD-10-CM

## 2018-04-15 DIAGNOSIS — R91.8 PULMONARY NODULES: Primary | ICD-10-CM

## 2018-04-18 ENCOUNTER — TELEPHONE (OUTPATIENT)
Dept: UROLOGY | Facility: CLINIC | Age: 68
End: 2018-04-18

## 2018-04-18 NOTE — TELEPHONE ENCOUNTER
----- Message from Kiana Greco sent at 4/18/2018 11:34 AM CDT -----  Type:  Patient Returning Call    Who Left Message for Patient:  Josy  Does the patient know what this is regarding?:  CT results  Best Call Back Number:  [phone]

## 2018-04-18 NOTE — TELEPHONE ENCOUNTER
----- Message from Ne Green MD sent at 4/15/2018  8:36 PM CDT -----  CT abd/pelvis - no abdominal nor pelvic findings.                            enarged prostate and bladder wall thickening                            Lung nodules  Labs are pending    Rec: CT chest and appt to discuss treatment plan

## 2018-04-18 NOTE — TELEPHONE ENCOUNTER
Returned call to give CT results with recommendation, no answer, message left with call back number.

## 2018-04-18 NOTE — TELEPHONE ENCOUNTER
Patient came into the office for CT results, results given with recommendation, CT chest scheduled, and he also will have his PSA done at that time, patient verbally understood.

## 2018-04-19 ENCOUNTER — HOSPITAL ENCOUNTER (OUTPATIENT)
Dept: RADIOLOGY | Facility: HOSPITAL | Age: 68
Discharge: HOME | End: 2018-04-19
Attending: UROLOGY
Payer: MEDICARE

## 2018-04-19 DIAGNOSIS — R91.8 PULMONARY NODULES: ICD-10-CM

## 2018-04-19 DIAGNOSIS — C64.1 RENAL CELL CARCINOMA OF RIGHT KIDNEY: ICD-10-CM

## 2018-04-19 PROCEDURE — 71250 CT THORAX DX C-: CPT | Mod: TC

## 2018-04-19 PROCEDURE — 71250 CT THORAX DX C-: CPT | Mod: 26,,, | Performed by: RADIOLOGY

## 2018-05-03 DIAGNOSIS — M54.9 BACK PAIN, UNSPECIFIED BACK LOCATION, UNSPECIFIED BACK PAIN LATERALITY, UNSPECIFIED CHRONICITY: ICD-10-CM

## 2018-05-03 RX ORDER — HYDROCODONE BITARTRATE AND ACETAMINOPHEN 7.5; 325 MG/1; MG/1
1 TABLET ORAL EVERY 8 HOURS PRN
Qty: 90 TABLET | Refills: 0 | OUTPATIENT
Start: 2018-05-03 | End: 2018-06-02

## 2018-05-09 ENCOUNTER — OFFICE VISIT (OUTPATIENT)
Dept: UROLOGY | Facility: CLINIC | Age: 68
End: 2018-05-09
Payer: MEDICARE

## 2018-05-09 ENCOUNTER — TELEPHONE (OUTPATIENT)
Dept: UROLOGY | Facility: CLINIC | Age: 68
End: 2018-05-09

## 2018-05-09 VITALS
TEMPERATURE: 98 F | SYSTOLIC BLOOD PRESSURE: 125 MMHG | DIASTOLIC BLOOD PRESSURE: 72 MMHG | WEIGHT: 154.31 LBS | HEIGHT: 65 IN | BODY MASS INDEX: 25.71 KG/M2 | RESPIRATION RATE: 18 BRPM | HEART RATE: 67 BPM

## 2018-05-09 DIAGNOSIS — C64.1 RENAL CELL CARCINOMA OF RIGHT KIDNEY: Primary | ICD-10-CM

## 2018-05-09 PROCEDURE — 99499 UNLISTED E&M SERVICE: CPT | Mod: S$GLB,,, | Performed by: UROLOGY

## 2018-05-09 PROCEDURE — 99999 PR PBB SHADOW E&M-EST. PATIENT-LVL III: CPT | Mod: PBBFAC,,, | Performed by: UROLOGY

## 2018-05-09 RX ORDER — SULFAMETHOXAZOLE AND TRIMETHOPRIM 800; 160 MG/1; MG/1
TABLET ORAL
COMMUNITY
Start: 2018-04-24 | End: 2018-09-11 | Stop reason: ALTCHOICE

## 2018-05-09 NOTE — TELEPHONE ENCOUNTER
----- Message from Narcisa Jimenez sent at 5/9/2018 10:39 AM CDT -----  Contact: Patient  Patient is calling to reschedule, he came to the office and checked-in and had to leave because he received an emergency call.  Patient asked if he could be seen later today.  Please call to let him know.  Call Back#[phone]  Thanks

## 2018-05-11 ENCOUNTER — OFFICE VISIT (OUTPATIENT)
Dept: UROLOGY | Facility: CLINIC | Age: 68
End: 2018-05-11
Payer: MEDICARE

## 2018-05-11 VITALS
HEIGHT: 65 IN | WEIGHT: 154.31 LBS | SYSTOLIC BLOOD PRESSURE: 125 MMHG | RESPIRATION RATE: 18 BRPM | TEMPERATURE: 98 F | DIASTOLIC BLOOD PRESSURE: 72 MMHG | BODY MASS INDEX: 25.71 KG/M2 | HEART RATE: 67 BPM

## 2018-05-11 DIAGNOSIS — R91.8 PULMONARY NODULES: ICD-10-CM

## 2018-05-11 DIAGNOSIS — N40.0 BENIGN PROSTATIC HYPERPLASIA, UNSPECIFIED WHETHER LOWER URINARY TRACT SYMPTOMS PRESENT: ICD-10-CM

## 2018-05-11 DIAGNOSIS — C64.1 RENAL CELL CARCINOMA OF RIGHT KIDNEY: ICD-10-CM

## 2018-05-11 DIAGNOSIS — R97.20 ELEVATED PROSTATE SPECIFIC ANTIGEN (PSA): Primary | ICD-10-CM

## 2018-05-11 PROCEDURE — 99214 OFFICE O/P EST MOD 30 MIN: CPT | Mod: S$GLB,,, | Performed by: UROLOGY

## 2018-05-11 PROCEDURE — 99999 PR PBB SHADOW E&M-EST. PATIENT-LVL III: CPT | Mod: PBBFAC,,, | Performed by: UROLOGY

## 2018-05-11 RX ORDER — FINASTERIDE 5 MG/1
5 TABLET, FILM COATED ORAL DAILY
Qty: 30 TABLET | Refills: 11 | Status: SHIPPED | OUTPATIENT
Start: 2018-05-11 | End: 2019-04-03 | Stop reason: SDUPTHER

## 2018-05-11 NOTE — PROGRESS NOTES
OFFICE NOTE    CHIEF COMPLAINT:  Renal cell carcinoma status post right radical nephrectomy,   solitary left kidney, BPH, elevated PSA, erectile dysfunction.    HISTORY OF PRESENT ILLNESS:  This 68-year-old male returns for routine recheck.    He has a history of renal cell carcinoma for which he underwent a right radical   nephrectomy in 2013 and so far has shown no evidence of any recurrences, but he   has been noted to have pulmonary nodules on CT scan for which he has been   followed up by his pulmonologist and also by Dr. Almeida in the past.  He   recently underwent followup CT scan of the chest, abdomen and pelvis, and this   did reveal increase in size of some of the pulmonary nodules compared to last   year for which a possibility of metastatic disease cannot be completely ruled   out.  Other additional findings on the CT scan revealing enlarged prostate.  The   patient also has a history of elevated PSA for which he had undergone prostate   ultrasound and biopsy in 2012 and has no evidence of any malignancy.  An MRI of   the prostate revealed a low probability of prostatic carcinoma.  His most recent   PSA had risen to 15.6 on  04/19/2018 that compares to 7.7 five months ago, but   it was also 15.4 nine months ago.  As mentioned to the patient that due to   enlarged prostate, this could be an explanation for the rising PSA, although the   possibility of a malignancy cannot be completely ruled out. He also has been   experiencing some lower urinary tract symptoms and also takes Flomax for   management of his BPH and it was mentioned to the patient that we may need to   consider placing him on Proscar to see if this helps reduce his prostatic size   and may also reduce his PSA levels.  He also has a history of erectile   dysfunction for which he continues to use TriMix intracavernosal injections,   which continues to be effective.    PHYSICAL EXAMINATION:  Physical examination was deferred on today's visit  since   it was done on 04/10/2018 and is essentially unremarkable and unchanged.    Continues to have a right inguinal hernia that appears to be stable.  Genital   and rectal examination also are stable with no evidence of any prostatic   nodules.    UA, none given.    FINAL IMPRESSION:  Renal cell carcinoma status post right radical nephrectomy,   solitary left kidney, BPH, elevated PSA, erectile dysfunction, pulmonary   nodules.    RECOMMENDATIONS:  Continue on Flomax 0.4 mg p.o. daily and will also add Proscar   5 mg p.o. daily.  The patient was instructed to follow up with Dr. Almeida,   his oncologist who has evaluated him in the past and also Dr. Diop, his   pulmonologist, also he has seen in the past in terms of the pulmonary nodules.    Otherwise, routine recheck with us in three months for followup PSA.      KHUSHI  dd: 05/11/2018 10:18:10 (CDT)  td: 05/12/2018 00:42:13 (CDT)  Doc ID   #[id number]  Job ID #[id number]    CC:

## 2018-05-16 ENCOUNTER — TELEPHONE (OUTPATIENT)
Dept: GASTROENTEROLOGY | Facility: CLINIC | Age: 68
End: 2018-05-16

## 2018-05-16 ENCOUNTER — TELEPHONE (OUTPATIENT)
Dept: UROLOGY | Facility: CLINIC | Age: 68
End: 2018-05-16

## 2018-05-16 DIAGNOSIS — G89.29 OTHER CHRONIC PAIN: Primary | ICD-10-CM

## 2018-05-16 RX ORDER — HYDROCODONE BITARTRATE AND ACETAMINOPHEN 7.5; 325 MG/1; MG/1
1 TABLET ORAL 3 TIMES DAILY PRN
Qty: 90 TABLET | Refills: 0 | Status: SHIPPED | OUTPATIENT
Start: 2018-05-16 | End: 2018-06-04 | Stop reason: SDUPTHER

## 2018-05-16 RX ORDER — HYDROCODONE BITARTRATE AND ACETAMINOPHEN 7.5; 325 MG/1; MG/1
1 TABLET ORAL 3 TIMES DAILY PRN
COMMUNITY
Start: 2018-04-10 | End: 2018-05-16 | Stop reason: SDUPTHER

## 2018-05-25 NOTE — TELEPHONE ENCOUNTER
----- Message from Gina Salomon sent at 5/3/2018  9:37 AM CDT -----  Contact: self  Pt is due for refill of hydrocodone.   HPI


Chief Complaint:  Sickle Cell


Time Seen by Provider:  08:47


Travel History


International Travel<30 days:  No


Contact w/Intl Traveler<30days:  No


Traveled to known affect area:  No





History of Present Illness


HPI


The patient is a 24-year-old -American male who presents to the 

emergency department for sickle cell crisis.  The patient states he has a 

history of sickle cell, developed pain yesterday that is located in low back 

and radiates up to the back and shoulders as well as into the legs.  The 

patient states that this is his normal sickle cell crisis.  He denies any chest 

pain, shortness of breath, cough, or fever.  The patient just graduated from 

[employer] and is headed back to [place], [place], to live with 

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  The patient was followed 

locally by his hematologist, Dr. Glover.  The patient states he has been 

taking hydroxyurea.  Symptoms are moderate, similar to previous sickle cell 

crisis.  There is no current alleviating factors.





PFSH


Past Medical History


Anemia:  Yes (SICKLE CELL)


Blood Disorders:  Yes ( G6PD DEFICIENCY)


Chest Pain:  Yes


Diminished Hearing:  No


Gastrointestinal Disorders:  Yes (gall stones)


Genetic Disorder:  Yes (SICKLE CELL, G6PD DEFICIENCY)


Immune Disorder:  Yes (SICKLE CELL)


Psychiatric:  Yes


Immunizations Current:  Yes


Pneumonia:  Yes


Sickle Cell Disease:  Yes





Past Surgical History


Oral Surgery:  Yes (dental surgery "AS A SMALL CHILD")


Other Surgery:  Yes (PICC LINE /picc line removal)





Social History


Alcohol Use:  No


Tobacco Use:  No


Substance Use:  No





Allergies-Medications


(Allergen,Severity, Reaction):  


Coded Allergies:  


     Sulfa (Sulfonamide Antibiotics) (Unverified  Allergy, Intermediate, itching

, 5/9/18)


     morphine (Unverified  Adverse Reaction, Mild, itching, 5/9/18)


 states it doesn't work and causes him to itch


Uncoded Allergies:  


     LEONARD BEANS (Allergy, Intermediate, 10/10/14)


Reported Meds & Prescriptions





Reported Meds & Active Scripts


Active


Percocet (Oxycodone-Acetaminophen)  mg Tab 1 Tab PO Q6H PRN


Augmentin (Amoxicillin-Clavulanate) 875-125 Mg Tab 1 Tab PO BID


Folic Acid 1 Mg Tablet 1 Mg PO DAILY


Hydrea (Hydroxyurea) 500 Mg Cap 500 Mg PO DAILY








Review of Systems


Except as stated in HPI:  all other systems reviewed are Neg


General / Constitutional:  No: Fever


Cardiovascular:  No: Chest Pain or Discomfort


Respiratory:  No: Shortness of Breath


Gastrointestinal:  No: Nausea, Vomiting, Abdominal Pain


Musculoskeletal:  Positive: Pain


Hematologic/Lymphatic:  Positive: Other (History of sickle cell disease)





Physical Exam


Narrative


GENERAL: Awake, alert, pleasant 24-year-old male who appears his stated age and 

is in no acute respiratory distress.


SKIN: Focused skin assessment warm/dry.


HEAD: Atraumatic. Normocephalic. 


EYES: Pupils equal and round.  No icterus noted.


ENT: No nasal bleeding or discharge.  Mucous membranes pink and moist.


NECK: Trachea midline. No JVD. 


CARDIOVASCULAR: Regular rate and rhythm.  No murmur appreciated.  Heart rate in 

the 70s.


RESPIRATORY: No accessory muscle use. Clear to auscultation. Breath sounds 

equal bilaterally. 


GASTROINTESTINAL: Abdomen soft, non-tender, nondistended.  No rebound 

tenderness.


MUSCULOSKELETAL: No obvious deformities. No clubbing.  No cyanosis.  No edema. 


NEUROLOGICAL: Awake and alert. No obvious cranial nerve deficits.  Motor 

grossly within normal limits. Normal speech.


PSYCHIATRIC: Appropriate mood and affect; insight and judgment normal.





Data


Data


Last Documented VS





Vital Signs








  Date Time  Temp Pulse Resp B/P (MAP) Pulse Ox O2 Delivery O2 Flow Rate FiO2


 


5/25/18 11:15  68 22 104/53 (70) 97 Room Air  


 


5/25/18 08:05 98.3       








Orders





 Orders


Basic Metabolic Panel (Bmp) (5/25/18 09:00)


Complete Blood Count With Diff (5/25/18 09:00)


Retic Count (5/25/18 09:00)


Ecg Monitoring (5/25/18 09:00)


Iv Access Insert/Monitor (5/25/18 09:00)


Oximetry (5/25/18 09:00)


Ketorolac Inj (Toradol Inj) (5/25/18 09:00)


Sodium Chloride 0.9% Flush (Ns Flush) (5/25/18 09:00)


Sodium Chlor 0.9% 1000 Ml Inj (Ns 1000 M (5/25/18 09:00)


Hydromorphone Pf Inj (Dilaudid Pf Inj) (5/25/18 09:00)


Diphenhydramine Inj (Benadryl Inj) (5/25/18 09:00)


Metoclopramide Inj (Reglan Inj) (5/25/18 09:15)


Hydromorphone Pf Inj (Dilaudid Pf Inj) (5/25/18 09:45)


Hydromorphone Pf Inj (Dilaudid Pf Inj) (5/25/18 11:00)


Sodium Chlor 0.9% 1000 Ml Inj (Ns 1000 M (5/25/18 11:00)


Ed Discharge Order (5/25/18 11:49)





Labs





Laboratory Tests








Test


  5/25/18


09:50


 


White Blood Count 12.2 TH/MM3 


 


Red Blood Count 2.69 MIL/MM3 


 


Hemoglobin 6.9 GM/DL 


 


Hematocrit 21.5 % 


 


Mean Corpuscular Volume 79.9 FL 


 


Mean Corpuscular Hemoglobin 25.8 PG 


 


Mean Corpuscular Hemoglobin


Concent 32.4 % 


 


 


Red Cell Distribution Width 17.4 % 


 


Platelet Count 240 TH/MM3 


 


Mean Platelet Volume 8.3 FL 


 


Neutrophils (%) (Auto) 58.4 % 


 


Lymphocytes (%) (Auto) 28.0 % 


 


Monocytes (%) (Auto) 7.4 % 


 


Eosinophils (%) (Auto) 5.5 % 


 


Basophils (%) (Auto) 0.7 % 


 


Neutrophils # (Auto) 7.1 TH/MM3 


 


Lymphocytes # (Auto) 3.4 TH/MM3 


 


Monocytes # (Auto) 0.9 TH/MM3 


 


Eosinophils # (Auto) 0.7 TH/MM3 


 


Basophils # (Auto) 0.1 TH/MM3 


 


CBC Comment AUTO DIFF 


 


Differential Total Cells


Counted 100 


 


 


Neutrophils % (Manual) 55 % 


 


Lymphocytes % 39 % 


 


Monocytes % 2 % 


 


Eosinophils % 4 % 


 


Neutrophils # (Manual) 6.7 TH/MM3 


 


Nucleated Red Blood Cells 7 /100 WBC 


 


Differential Comment


  FINAL DIFF


MANUAL


 


Platelet Estimate NORMAL 


 


Platelet Morphology Comment NORMAL 


 


Polychromasia 4.3 % 


 


Target Cells 1+ 


 


Ovalocytes 1+ 


 


Reticulocyte Count 13.1 % 


 


Absolute Reticulocyte Count 351.2 MIL/L 


 


Blood Urea Nitrogen 8 MG/DL 


 


Creatinine 0.54 MG/DL 


 


Random Glucose 91 MG/DL 


 


Calcium Level 8.7 MG/DL 


 


Sodium Level 142 MEQ/L 


 


Potassium Level 3.5 MEQ/L 


 


Chloride Level 109 MEQ/L 


 


Carbon Dioxide Level 24.3 MEQ/L 


 


Anion Gap 9 MEQ/L 


 


Estimat Glomerular Filtration


Rate 227 ML/MIN 


 











MDM


Medical Decision Making


Medical Screen Exam Complete:  Yes


Emergency Medical Condition:  Yes


Medical Record Reviewed:  Yes


Interpretation(s)








Laboratory Tests








Test


  5/25/18


09:50


 


White Blood Count 12.2 TH/MM3 


 


Red Blood Count 2.69 MIL/MM3 


 


Hemoglobin 6.9 GM/DL 


 


Hematocrit 21.5 % 


 


Mean Corpuscular Volume 79.9 FL 


 


Mean Corpuscular Hemoglobin 25.8 PG 


 


Mean Corpuscular Hemoglobin


Concent 32.4 % 


 


 


Red Cell Distribution Width 17.4 % 


 


Platelet Count 240 TH/MM3 


 


Mean Platelet Volume 8.3 FL 


 


Neutrophils (%) (Auto) 58.4 % 


 


Lymphocytes (%) (Auto) 28.0 % 


 


Monocytes (%) (Auto) 7.4 % 


 


Eosinophils (%) (Auto) 5.5 % 


 


Basophils (%) (Auto) 0.7 % 


 


Neutrophils # (Auto) 7.1 TH/MM3 


 


Lymphocytes # (Auto) 3.4 TH/MM3 


 


Monocytes # (Auto) 0.9 TH/MM3 


 


Eosinophils # (Auto) 0.7 TH/MM3 


 


Basophils # (Auto) 0.1 TH/MM3 


 


CBC Comment AUTO DIFF 


 


Differential Total Cells


Counted 100 


 


 


Neutrophils % (Manual) 55 % 


 


Lymphocytes % 39 % 


 


Monocytes % 2 % 


 


Eosinophils % 4 % 


 


Neutrophils # (Manual) 6.7 TH/MM3 


 


Nucleated Red Blood Cells 7 /100 WBC 


 


Differential Comment


  FINAL DIFF


MANUAL


 


Platelet Estimate NORMAL 


 


Platelet Morphology Comment NORMAL 


 


Polychromasia 4.3 % 


 


Target Cells 1+ 


 


Ovalocytes 1+ 


 


Reticulocyte Count 13.1 % 


 


Absolute Reticulocyte Count 351.2 MIL/L 


 


Blood Urea Nitrogen 8 MG/DL 


 


Creatinine 0.54 MG/DL 


 


Random Glucose 91 MG/DL 


 


Calcium Level 8.7 MG/DL 


 


Sodium Level 142 MEQ/L 


 


Potassium Level 3.5 MEQ/L 


 


Chloride Level 109 MEQ/L 


 


Carbon Dioxide Level 24.3 MEQ/L 


 


Anion Gap 9 MEQ/L 


 


Estimat Glomerular Filtration


Rate 227 ML/MIN 


 








Differential Diagnosis


Differential diagnosis includes sickle cell crisis, symptomatic anemia, vaso-

occlusive crisis, acute chest syndrome, hemolytic anemia, chronic pain.


Narrative Course


IV was established, labs are drawn and sent, and the patient was placed on 

cardiac telemetry monitoring and continuous pulse oximetry monitoring.  The 

patient was administered Toradol, Dilaudid, Reglan, Benadryl, and IV fluids.  

Patient's hemoglobin is 6.9, baseline appears to be 7-8.  No tachycardia at 

this time, I do not suspect acute chest syndrome, will not transfuse as patient 

does have a history of elevated iron levels from previous transfusion.  The 

patient was reassessed at 10:50 AM, still has mild discomfort.  Therefore, 

patient was administered a second dose of pain medications and IV fluids.  The 

patient was reevaluated at 11:50 AM, his pain has significantly improved.  

Vitals are stable.  The patient will be discharged home, will be provided a 

copy of his labs at discharge.  He is advised to follow-up with his primary 

physician and hematologist.  Return if symptoms worsen or progress.





Diagnosis





 Primary Impression:  


 Sickle cell disease with crisis


Patient Instructions:  General Instructions, Narcotic given in the ED, Sickle 

Cell Crisis (ED)





***Additional Instructions:  


Follow-up with your primary physician and hematologist.  Please provide the 

patient a copy of his labs at discharge.  Return if symptoms worsen or progress.


***Med/Other Pt SpecificInfo:  No Change to Meds


Disposition:  01 DISCHARGE HOME


Condition:  Stable











Valdemar Pat MD May 25, 2018 09:05

## 2018-06-04 DIAGNOSIS — G89.29 OTHER CHRONIC PAIN: ICD-10-CM

## 2018-06-04 RX ORDER — HYDROCODONE BITARTRATE AND ACETAMINOPHEN 7.5; 325 MG/1; MG/1
1 TABLET ORAL 3 TIMES DAILY PRN
Qty: 90 TABLET | Refills: 0 | Status: SHIPPED | OUTPATIENT
Start: 2018-06-16 | End: 2018-07-02 | Stop reason: SDUPTHER

## 2018-06-04 NOTE — TELEPHONE ENCOUNTER
----- Message from Gina Salomon sent at 6/4/2018  4:47 PM CDT -----  Contact: self  Pt is due for refill of hydrocodone.

## 2018-06-11 DIAGNOSIS — Z12.11 ENCOUNTER FOR SCREENING COLONOSCOPY: Primary | ICD-10-CM

## 2018-06-11 RX ORDER — POLYETHYLENE GLYCOL 3350, SODIUM SULFATE ANHYDROUS, SODIUM BICARBONATE, SODIUM CHLORIDE, POTASSIUM CHLORIDE 236; 22.74; 6.74; 5.86; 2.97 G/4L; G/4L; G/4L; G/4L; G/4L
4 POWDER, FOR SOLUTION ORAL ONCE
Qty: 1 BOTTLE | Refills: 0 | Status: SHIPPED | OUTPATIENT
Start: 2018-06-11 | End: 2018-06-11

## 2018-07-02 DIAGNOSIS — G89.29 OTHER CHRONIC PAIN: ICD-10-CM

## 2018-07-02 DIAGNOSIS — Z12.11 ENCOUNTER FOR SCREENING COLONOSCOPY: Primary | ICD-10-CM

## 2018-07-02 RX ORDER — HYDROCODONE BITARTRATE AND ACETAMINOPHEN 7.5; 325 MG/1; MG/1
1 TABLET ORAL 3 TIMES DAILY PRN
Qty: 90 TABLET | Refills: 0 | Status: SHIPPED | OUTPATIENT
Start: 2018-07-17 | End: 2018-08-07 | Stop reason: SDUPTHER

## 2018-07-02 RX ORDER — SODIUM, POTASSIUM,MAG SULFATES 17.5-3.13G
1 SOLUTION, RECONSTITUTED, ORAL ORAL DAILY
Qty: 1 KIT | Refills: 0 | Status: SHIPPED | OUTPATIENT
Start: 2018-07-02 | End: 2018-07-04

## 2018-07-02 NOTE — TELEPHONE ENCOUNTER
----- Message from Gina Salomon sent at 7/2/2018  2:34 PM CDT -----  Contact: SELF  PT IS DUE FOR REFILL OF HYDROCODONE.  ALSO, HE IS HAVING A COLONOSCOPY ON 7/9/18 AND WOULD LIKE TO TRY SUPREP INSTEAD OF THE GALLON JUG.  PLEASE SEND SUPREP TO HIS PHARMACY.

## 2018-07-18 DIAGNOSIS — R91.1 PULMONARY NODULE: Primary | ICD-10-CM

## 2018-07-23 NOTE — NURSING
Pt scheduled for CT Lung Biopsy- H&P provided from Dr deleon office is outdated- Per Radiologist request a more recent H&P was obtained via fax from pts PCP Dr Sekou Lara with a date of 6.22.18- scanned into media manager. Dr Marija jason.

## 2018-07-24 ENCOUNTER — LAB VISIT (OUTPATIENT)
Dept: LAB | Facility: HOSPITAL | Age: 68
End: 2018-07-24
Attending: UROLOGY
Payer: MEDICARE

## 2018-07-24 DIAGNOSIS — R97.20 ELEVATED PROSTATE SPECIFIC ANTIGEN (PSA): ICD-10-CM

## 2018-07-24 LAB — COMPLEXED PSA SERPL-MCNC: 14 NG/ML

## 2018-07-24 PROCEDURE — 84153 ASSAY OF PSA TOTAL: CPT

## 2018-07-24 PROCEDURE — 36415 COLL VENOUS BLD VENIPUNCTURE: CPT

## 2018-07-26 NOTE — NURSING
Unable to contact patient via phone to go over pre procedure instructions. Left voice mail message with instructions to call [phone] and I will try to contact patient tomorrow.

## 2018-07-27 NOTE — NURSING
Received phone call from patient stating he will not be able to have his lung biopsy on Friday, August 3rd because he will be out of town, he stated he would like to reschedule for sometime the following week.  Scheduling notified of cancellation

## 2018-08-03 ENCOUNTER — HOSPITAL ENCOUNTER (OUTPATIENT)
Dept: RADIOLOGY | Facility: HOSPITAL | Age: 68
Discharge: HOME | End: 2018-08-03
Attending: INTERNAL MEDICINE
Payer: MEDICARE

## 2018-08-03 DIAGNOSIS — R91.8 PULMONARY NODULES: Primary | ICD-10-CM

## 2018-08-07 DIAGNOSIS — G89.29 OTHER CHRONIC PAIN: ICD-10-CM

## 2018-08-07 NOTE — TELEPHONE ENCOUNTER
----- Message from Gina Salomon sent at 8/7/2018  8:55 AM CDT -----  Contact: self  Is due for refill of hydrocodone.

## 2018-08-07 NOTE — NURSING
Called patient to go over pre op instructions for scheduled CT Lung biopsy on Aug 14th. No answer, left voice mail with RN's phone number.

## 2018-08-08 ENCOUNTER — OFFICE VISIT (OUTPATIENT)
Dept: UROLOGY | Facility: CLINIC | Age: 68
End: 2018-08-08
Payer: MEDICARE

## 2018-08-08 VITALS
TEMPERATURE: 99 F | HEART RATE: 84 BPM | BODY MASS INDEX: 25.71 KG/M2 | SYSTOLIC BLOOD PRESSURE: 139 MMHG | RESPIRATION RATE: 18 BRPM | WEIGHT: 154.31 LBS | HEIGHT: 65 IN | DIASTOLIC BLOOD PRESSURE: 76 MMHG

## 2018-08-08 DIAGNOSIS — C64.1 RENAL CELL CARCINOMA OF RIGHT KIDNEY: Primary | ICD-10-CM

## 2018-08-08 DIAGNOSIS — R97.20 ELEVATED PROSTATE SPECIFIC ANTIGEN (PSA): ICD-10-CM

## 2018-08-08 DIAGNOSIS — N52.9 ERECTILE DYSFUNCTION, UNSPECIFIED ERECTILE DYSFUNCTION TYPE: ICD-10-CM

## 2018-08-08 DIAGNOSIS — R91.8 PULMONARY NODULES: ICD-10-CM

## 2018-08-08 DIAGNOSIS — N40.0 BENIGN PROSTATIC HYPERPLASIA, UNSPECIFIED WHETHER LOWER URINARY TRACT SYMPTOMS PRESENT: ICD-10-CM

## 2018-08-08 LAB
BILIRUB SERPL-MCNC: NORMAL MG/DL
BLOOD URINE, POC: NORMAL
COLOR, POC UA: CLEAR
GLUCOSE UR QL STRIP: NORMAL
KETONES UR QL STRIP: NORMAL
LEUKOCYTE ESTERASE URINE, POC: NORMAL
NITRITE, POC UA: NORMAL
PH, POC UA: 8
PROTEIN, POC: NORMAL
SPECIFIC GRAVITY, POC UA: 1.01
UROBILINOGEN, POC UA: NORMAL

## 2018-08-08 PROCEDURE — 81002 URINALYSIS NONAUTO W/O SCOPE: CPT | Mod: S$GLB,,, | Performed by: UROLOGY

## 2018-08-08 PROCEDURE — 99999 PR PBB SHADOW E&M-EST. PATIENT-LVL III: CPT | Mod: PBBFAC,,, | Performed by: UROLOGY

## 2018-08-08 PROCEDURE — 99214 OFFICE O/P EST MOD 30 MIN: CPT | Mod: 25,S$GLB,, | Performed by: UROLOGY

## 2018-08-08 RX ORDER — HYDROCODONE BITARTRATE AND ACETAMINOPHEN 7.5; 325 MG/1; MG/1
1 TABLET ORAL 3 TIMES DAILY PRN
Qty: 90 TABLET | Refills: 0 | Status: SHIPPED | OUTPATIENT
Start: 2018-08-08 | End: 2018-09-04 | Stop reason: SDUPTHER

## 2018-08-08 NOTE — H&P (VIEW-ONLY)
OFFICE NOTE    CHIEF COMPLAINT:  Renal cell carcinoma status post right radical nephrectomy,   solitary left kidney, BPH, elevated PSA, erectile dysfunction, and pulmonary   nodules.    HISTORY OF PRESENT ILLNESS:  This 68-year-old male returns for routine recheck.    He has a history of renal cell carcinoma, for which he underwent right radical   nephrectomy in 2013 and so far has shown no evidence of recurrences, but he was   noted to have pulmonary nodules on a CT scan, which is being followed by his   pulmonologist and also by Dr. Almeida, his oncologist.  A recent CT of the   chest, abdomen, and pelvis did reveal that the pulmonary nodules appear to have   increased in size.  An attempt was made to obtain pulmonary biopsy in June 2018,   but the biopsy attempt was unsuccessful and the plan is to repeat this again   due later this month.  There is no obvious evidence of any metastatic disease,   but it cannot be ruled out pending the biopsy of the pulmonary nodule.  The   patient also has a history of elevated PSA, for which he has undergone prostate   ultrasound and biopsy in 2012 with no evidence of any malignancy and an MRI of   the prostate also revealed a low probability of developing prostatic carcinoma.    His most recent PSA was 14.0 on 07/24/2018, having come down from 15.6.  He   also has a history of BPH with lower urinary tract symptoms for which he   continues to be on Proscar, and Flomax with which he is doing well.  In terms of   his erectile dysfunction, he continues to use TriMix intracavernosal   injections, which continues to be effective.    MEDICAL HISTORY UPDATE:  As described above.  He is due to undergo a lung   biopsy.    PHYSICAL EXAMINATION:  ABDOMEN:  Soft, benign, and nontender.  There is a right inguinal hernia that is   unchanged from prior examinations.  EXTERNAL GENITALIA:  Normal phallus with adequate meatus.  Testes descended and   feel normal.  No scrotal masses.  RECTAL:   Reveals a 35 g, smooth prostate, was although somewhat firm, but no   nodules.    UA negative with pH 8.0.    His last PSA was 14.0 on 07/24/2018.    FINAL IMPRESSION:  Renal cell carcinoma status post right radical nephrectomy,   solitary left kidney, benign prostatic hypertrophy, elevated prostate-specific   antigen, erectile dysfunction, and pulmonary nodule.    RECOMMENDATIONS:  We will await the finding of the pulmonary biopsy report.    Otherwise, he will continue on Proscar and Flomax for management of his BPH, and   he will also continue on TriMix for management of his erectile dysfunction.    Otherwise, if no other change, if the biopsy proves to be negative, routine   recheck in six months.      KHUSHI  dd: 08/08/2018 14:20:21 (CDT)  td: 08/09/2018 04:52:37 (CDT)  Doc ID   #[id number]  Job ID #[id number]    CC:

## 2018-08-08 NOTE — PROGRESS NOTES
OFFICE NOTE    CHIEF COMPLAINT:  Renal cell carcinoma status post right radical nephrectomy,   solitary left kidney, BPH, elevated PSA, erectile dysfunction, and pulmonary   nodules.    HISTORY OF PRESENT ILLNESS:  This 68-year-old male returns for routine recheck.    He has a history of renal cell carcinoma, for which he underwent right radical   nephrectomy in 2013 and so far has shown no evidence of recurrences, but he was   noted to have pulmonary nodules on a CT scan, which is being followed by his   pulmonologist and also by Dr. Almeida, his oncologist.  A recent CT of the   chest, abdomen, and pelvis did reveal that the pulmonary nodules appear to have   increased in size.  An attempt was made to obtain pulmonary biopsy in June 2018,   but the biopsy attempt was unsuccessful and the plan is to repeat this again   due later this month.  There is no obvious evidence of any metastatic disease,   but it cannot be ruled out pending the biopsy of the pulmonary nodule.  The   patient also has a history of elevated PSA, for which he has undergone prostate   ultrasound and biopsy in 2012 with no evidence of any malignancy and an MRI of   the prostate also revealed a low probability of developing prostatic carcinoma.    His most recent PSA was 14.0 on 07/24/2018, having come down from 15.6.  He   also has a history of BPH with lower urinary tract symptoms for which he   continues to be on Proscar, and Flomax with which he is doing well.  In terms of   his erectile dysfunction, he continues to use TriMix intracavernosal   injections, which continues to be effective.    MEDICAL HISTORY UPDATE:  As described above.  He is due to undergo a lung   biopsy.    PHYSICAL EXAMINATION:  ABDOMEN:  Soft, benign, and nontender.  There is a right inguinal hernia that is   unchanged from prior examinations.  EXTERNAL GENITALIA:  Normal phallus with adequate meatus.  Testes descended and   feel normal.  No scrotal masses.  RECTAL:   Reveals a 35 g, smooth prostate, was although somewhat firm, but no   nodules.    UA negative with pH 8.0.    His last PSA was 14.0 on 07/24/2018.    FINAL IMPRESSION:  Renal cell carcinoma status post right radical nephrectomy,   solitary left kidney, benign prostatic hypertrophy, elevated prostate-specific   antigen, erectile dysfunction, and pulmonary nodule.    RECOMMENDATIONS:  We will await the finding of the pulmonary biopsy report.    Otherwise, he will continue on Proscar and Flomax for management of his BPH, and   he will also continue on TriMix for management of his erectile dysfunction.    Otherwise, if no other change, if the biopsy proves to be negative, routine   recheck in six months.      KHUSHI  dd: 08/08/2018 14:20:21 (CDT)  td: 08/09/2018 04:52:37 (CDT)  Doc ID   #[id number]  Job ID #[id number]    CC:

## 2018-08-09 NOTE — NURSING
Patient called stating he was not going to make scheduled CT lung biopsy, on August 14th, because he would be out of town. Informed him I would have Soila in registration contact him for another date.

## 2018-08-14 ENCOUNTER — HOSPITAL ENCOUNTER (OUTPATIENT)
Dept: RADIOLOGY | Facility: HOSPITAL | Age: 68
Discharge: HOME | End: 2018-08-14
Attending: INTERNAL MEDICINE
Payer: MEDICARE

## 2018-08-21 DIAGNOSIS — K21.9 GASTROESOPHAGEAL REFLUX DISEASE, ESOPHAGITIS PRESENCE NOT SPECIFIED: Primary | ICD-10-CM

## 2018-08-21 RX ORDER — PANTOPRAZOLE SODIUM 40 MG/1
40 TABLET, DELAYED RELEASE ORAL DAILY
Qty: 90 TABLET | Refills: 3 | Status: SHIPPED | OUTPATIENT
Start: 2018-08-21 | End: 2019-06-03 | Stop reason: SDUPTHER

## 2018-08-24 ENCOUNTER — TELEPHONE (OUTPATIENT)
Dept: RADIOLOGY | Facility: HOSPITAL | Age: 68
End: 2018-08-24

## 2018-08-24 ENCOUNTER — TELEPHONE (OUTPATIENT)
Dept: UROLOGY | Facility: CLINIC | Age: 68
End: 2018-08-24

## 2018-08-24 NOTE — NURSING
Attempted to reach patient to go over pre-op for scheduled CT Lung Bx Tuesday- no answer LMOM- will attempt again on monday,

## 2018-08-24 NOTE — TELEPHONE ENCOUNTER
----- Message from Ray Bowers sent at 8/24/2018  7:59 AM CDT -----  Contact: same  Patient called in and requested a call back from nurse (would not give any details).  Call back number is [phone]

## 2018-08-24 NOTE — TELEPHONE ENCOUNTER
Returned call, patient states that he is having burning with urination and would antibiotic called in, message to be given to the MD to advise.

## 2018-08-27 ENCOUNTER — TELEPHONE (OUTPATIENT)
Dept: RADIOLOGY | Facility: HOSPITAL | Age: 68
End: 2018-08-27

## 2018-08-27 NOTE — NURSING
Patient returned phone call concerning pre-procedure instructions for CT Lung Biopsy scheduled Tuesday, August 28th at 9:00. Instructed patient to arrive no later than 7:30am for labs and pre-op. Instructed patient to have nothing to eat or drink after midnight tonight. Instructed patient to make arrangements in advance for transportation home, advised patient he would be here up to 3 1/2 hours post procedure for 3 hour follow up chest xray. Also suggested to bring a small over night bag in case patient is admitted to hospital.  Patient verbalized understanding.

## 2018-08-28 ENCOUNTER — HOSPITAL ENCOUNTER (OUTPATIENT)
Dept: RADIOLOGY | Facility: HOSPITAL | Age: 68
Discharge: HOME | End: 2018-08-28
Attending: RADIOLOGY
Payer: MEDICARE

## 2018-08-28 ENCOUNTER — HOSPITAL ENCOUNTER (OUTPATIENT)
Dept: RADIOLOGY | Facility: HOSPITAL | Age: 68
Discharge: HOME | End: 2018-08-28
Attending: INTERNAL MEDICINE
Payer: MEDICARE

## 2018-08-28 VITALS
OXYGEN SATURATION: 97 % | TEMPERATURE: 98 F | HEART RATE: 60 BPM | DIASTOLIC BLOOD PRESSURE: 68 MMHG | SYSTOLIC BLOOD PRESSURE: 143 MMHG | RESPIRATION RATE: 18 BRPM

## 2018-08-28 DIAGNOSIS — R91.1 PULMONARY NODULE: ICD-10-CM

## 2018-08-28 PROCEDURE — 32405 CT BIOPSY LUNG (XPD): CPT | Mod: ,,, | Performed by: RADIOLOGY

## 2018-08-28 PROCEDURE — 63600175 PHARM REV CODE 636 W HCPCS

## 2018-08-28 PROCEDURE — 88342 IMHCHEM/IMCYTCHM 1ST ANTB: CPT | Mod: 26,,, | Performed by: PATHOLOGY

## 2018-08-28 PROCEDURE — 77012 CT SCAN FOR NEEDLE BIOPSY: CPT | Mod: TC

## 2018-08-28 PROCEDURE — 25000003 PHARM REV CODE 250

## 2018-08-28 PROCEDURE — 88305 TISSUE EXAM BY PATHOLOGIST: CPT | Performed by: PATHOLOGY

## 2018-08-28 PROCEDURE — 71045 X-RAY EXAM CHEST 1 VIEW: CPT | Mod: TC,FY

## 2018-08-28 PROCEDURE — 77012 CT SCAN FOR NEEDLE BIOPSY: CPT | Mod: 26,,, | Performed by: RADIOLOGY

## 2018-08-28 PROCEDURE — 71045 X-RAY EXAM CHEST 1 VIEW: CPT | Mod: 26,,, | Performed by: RADIOLOGY

## 2018-08-28 PROCEDURE — 88305 TISSUE EXAM BY PATHOLOGIST: CPT | Mod: 26,,, | Performed by: PATHOLOGY

## 2018-08-28 PROCEDURE — 71045 X-RAY EXAM CHEST 1 VIEW: CPT | Mod: 26,76,, | Performed by: RADIOLOGY

## 2018-08-28 RX ORDER — FENTANYL CITRATE 50 UG/ML
INJECTION, SOLUTION INTRAMUSCULAR; INTRAVENOUS
Status: COMPLETED
Start: 2018-08-28 | End: 2018-08-28

## 2018-08-28 RX ORDER — MIDAZOLAM HYDROCHLORIDE 1 MG/ML
1 INJECTION INTRAMUSCULAR; INTRAVENOUS
Status: DISCONTINUED | OUTPATIENT
Start: 2018-08-28 | End: 2018-08-29 | Stop reason: HOSPADM

## 2018-08-28 RX ORDER — LIDOCAINE HYDROCHLORIDE 10 MG/ML
INJECTION, SOLUTION EPIDURAL; INFILTRATION; INTRACAUDAL; PERINEURAL
Status: COMPLETED
Start: 2018-08-28 | End: 2018-08-28

## 2018-08-28 RX ORDER — FENTANYL CITRATE 50 UG/ML
25 INJECTION, SOLUTION INTRAMUSCULAR; INTRAVENOUS
Status: DISCONTINUED | OUTPATIENT
Start: 2018-08-28 | End: 2018-08-29 | Stop reason: HOSPADM

## 2018-08-28 RX ORDER — MIDAZOLAM HYDROCHLORIDE 1 MG/ML
INJECTION, SOLUTION INTRAMUSCULAR; INTRAVENOUS
Status: COMPLETED
Start: 2018-08-28 | End: 2018-08-28

## 2018-08-28 RX ORDER — LIDOCAINE HYDROCHLORIDE 10 MG/ML
30 INJECTION INFILTRATION; PERINEURAL ONCE
Status: COMPLETED | OUTPATIENT
Start: 2018-08-28 | End: 2018-08-28

## 2018-08-28 RX ADMIN — FENTANYL CITRATE 50 MCG: 50 INJECTION, SOLUTION INTRAMUSCULAR; INTRAVENOUS at 09:08

## 2018-08-28 RX ADMIN — LIDOCAINE HYDROCHLORIDE 20 ML: 10 INJECTION, SOLUTION EPIDURAL; INFILTRATION; INTRACAUDAL; PERINEURAL at 09:08

## 2018-08-28 RX ADMIN — LIDOCAINE HYDROCHLORIDE 20 ML: 10 INJECTION INFILTRATION; PERINEURAL at 09:08

## 2018-08-28 RX ADMIN — FENTANYL CITRATE 50 MCG: 50 INJECTION INTRAMUSCULAR; INTRAVENOUS at 09:08

## 2018-08-28 RX ADMIN — MIDAZOLAM HYDROCHLORIDE 2 MG: 1 INJECTION INTRAMUSCULAR; INTRAVENOUS at 09:08

## 2018-08-28 NOTE — OR NURSING
Pt. Is AAOx4, calm and cooperative.  VS wnl, breathing unlabored and even on room air.  Pt. Has a small pink papule on right axilla and states he has been on antibiotics for 10 days and then started on second round of abx on Saturday for treatment of staph infection from MD at [facility].  No drainage and appears localized.  Pt. Lives alone and his daughter will drive him home. Ready for procedure.

## 2018-08-28 NOTE — DISCHARGE SUMMARY
Radiology Discharge Summary      Admit date: 8/28/2018  7:28 AM  Discharge date: August 28, 2018    Instructions Given to patient: YesVerbal    Diet: Regular    Activity:NO Restrictions    Medications on discharge (List): Refer to Discharge Medication List    Hospital Course: Pt brought to CT scan and  following informed consent and time 0ut, received moderate sedation of fentanyl 50 mcg and versed 2mg Iv and was monitored  For BP , pulse and pulse oximetry from 09:30 to 09:49 by Dr. Dutton and ALICIA Sheriff.   With CT guidance following sterile prep and local anesthesia a 19 g needle was advanced to mass and 4 tissue cores obtained and placed in formalin. Post procedure ct shows tiniest amount of air at post gutter and mod. Lung contusion. Post Bx cxr's ordered and pt sent to Day Surgery for three hours recovery then DC home.     Description of Condition on Discharge: stable    Discharge Disposition: Home    Discharge Diagnosis: lung masses post needle biopsy    Patient to Follow up with Leyda Anna and  Norma

## 2018-08-28 NOTE — PROGRESS NOTES
Pt discharged to friend per wheelchair to car.  Denies pain or SOB, lungs clear and equal bilaterlly.  Voiding qs.  banid to site clean and dry.

## 2018-08-28 NOTE — DISCHARGE INSTRUCTIONS
CT-Guided Lung Biopsy  CT-guided lung biopsy is a procedure to collect small tissue samples from an abnormal area in your lung. During the procedure, an imaging method called CT (computed tomography) is used to show live pictures of your lung. Then a thin needle is used to remove the tissue samples. The samples are tested in a lab for cancer and other problems.     For the biopsy, a thin needle is used to remove samples of tissue from an abnormal area in the lung.   Getting ready for your procedure  Follow any instructions from your healthcare provider.  Tell your provider about any medicines you are taking. It is important for your provider to know if you are taking any blood-thinning medicines or have a bleeding disorder.  You may need to stop taking all or some medicine before the procedure. This includes:  · All prescription medicines  · Blood-thinning medicines (anticoagulants)  · Over-the-counter medicines such as aspirin or ibuprofen  · Street drugs  · Herbs, vitamins, and other supplements  Also tell your provider if you:  · Are pregnant or think you may be pregnant  · Are breastfeeding  · Are allergic to or have intolerances to any medicines  · Have a chronic cough, new cough, or other illness  · Use oxygen therapy at home  · Smoke or drink alcohol on a regular basis  Follow any directions youre given for not eating or drinking before the procedure.  The day of your procedure  The procedure takes about 60 minutes. The entire procedure (including time to prepare and recover) takes several hours. Youll likely go home the same day.  Before the procedure begins:  · An IV (intravenous) line may be put into a vein in your hand or arm. This line supplies fluids and medicines.  · To keep you free of pain during the procedure, you may be given anesthesia. Depending on the type of anesthesia used, you may be awake, drowsy, or in a deep sleep for the procedure.  During the procedure:  · Youll lie on a CT scan  table. You may be on your back, side, or stomach. Pictures of your lung are then taken using the CT scanner. This helps your provider find the best place to position the needle in your lungs.  · A patricia is made on your skin where the needle will be inserted (biopsy site). The site is injected with numbing medicine.  · Using the CT pictures as a guide, the needle is passed through the numbed skin between your ribs and into your lung. Samples of tissue are then removed from the abnormal area in your lung. The samples are sent to a lab to be checked for problems.  · When the procedure is complete, the needle is removed. Pressure is applied to the biopsy site to help stop any bleeding. The site is then bandaged.  After the procedure:  · Youll be taken to a room to rest until the anesthesia wears off.  · A chest X-ray may be done. This is to make sure there was no damage to your lungs or the area where the needle was placed.  · When its time for you to go home, have an adult family member or friend ready to drive you.  Recovering at home  You may cough up a small amount of blood shortly after the procedure. Later, you may also have some soreness around the biopsy site. Once at home, follow any instructions youre given. Be sure to:  · Take all medicines as directed.  · Care for the biopsy site as instructed.  · Check for signs of infection at the biopsy site (see below).  · Do not bathe or shower until your provider says it's OK. If you wish, you may wash with a sponge or washcloth.  · Avoid heavy lifting and other strenuous activities as directed.  · If you plan any air travel, ask your provider when you can do so. You may be told not to fly for a few weeks. This is because pressure changes may affect your lungs.  When to call your provider  Call 911 or your local emergency number if you have sudden, severe difficulty breathing. This could be a life-threatening emergency.  Call your healthcare provider for less severe  symptoms that are still concerning. If you are unable to speak with your provider, go to the emergency room if you have any of the following symptoms:  · Fever of 100.4°F (38°C) or higher, or as directed by your provider  · Sudden chest pain, shortness of breath, or fainting  · Coughing up increasing amounts of blood  · Signs of infection at the biopsy site, such as increased redness or swelling, warmth, more pain, bleeding, or bad-smelling drainage   Follow-up  The provider who ordered your test will discuss the biopsy results with you during a follow-up visit. Results are usually ready within 1 to 2 weeks. If more tests or treatments are needed, your provider will discuss these with you.  Risks and possible complications  All procedures have some risk. Possible risks of this procedure include:  · An air leak in your lung (pneumothorax). This may require a stay in the hospital and treatment to re-inflate the lung.  · Bleeding into or around the lung  · Infection in the skin or lung  · Injury to other structures in the chest  · Risks of anesthesia. This will be discussed with you before the procedure.   Date Last Reviewed: 6/11/2015  © 5288-3367 [employer]. [address] [place] [address], [place] [address]. All rights reserved. This information is not intended as a substitute for professional medical care. Always follow your healthcare professional's instructions.

## 2018-08-28 NOTE — INTERVAL H&P NOTE
The patient has been examined and the H&P has been reviewed:    I concur with the findings and no changes have occurred since H&P was written.        Active Hospital Problems    Diagnosis  POA    Pulmonary nodule [R91.1]  Yes      Resolved Hospital Problems   No resolved problems to display.

## 2018-09-03 ENCOUNTER — HOSPITAL ENCOUNTER (EMERGENCY)
Facility: HOSPITAL | Age: 68
Discharge: HOME | End: 2018-09-03
Attending: EMERGENCY MEDICINE
Payer: MEDICARE

## 2018-09-03 VITALS
RESPIRATION RATE: 16 BRPM | HEIGHT: 65 IN | HEART RATE: 76 BPM | OXYGEN SATURATION: 99 % | BODY MASS INDEX: 24.66 KG/M2 | DIASTOLIC BLOOD PRESSURE: 74 MMHG | WEIGHT: 148 LBS | TEMPERATURE: 99 F | SYSTOLIC BLOOD PRESSURE: 135 MMHG

## 2018-09-03 DIAGNOSIS — R04.2 HEMOPTYSIS: ICD-10-CM

## 2018-09-03 LAB
ANION GAP SERPL CALC-SCNC: 10 MMOL/L
BASOPHILS # BLD AUTO: 0.1 K/UL
BASOPHILS NFR BLD: 1.2 %
BUN SERPL-MCNC: 26 MG/DL
CALCIUM SERPL-MCNC: 10.5 MG/DL
CHLORIDE SERPL-SCNC: 106 MMOL/L
CO2 SERPL-SCNC: 21 MMOL/L
CREAT SERPL-MCNC: 1.7 MG/DL
DIFFERENTIAL METHOD: ABNORMAL
EOSINOPHIL # BLD AUTO: 0.4 K/UL
EOSINOPHIL NFR BLD: 4.2 %
ERYTHROCYTE [DISTWIDTH] IN BLOOD BY AUTOMATED COUNT: 12.7 %
EST. GFR  (AFRICAN AMERICAN): 47 ML/MIN/1.73 M^2
EST. GFR  (NON AFRICAN AMERICAN): 41 ML/MIN/1.73 M^2
GLUCOSE SERPL-MCNC: 132 MG/DL
HCT VFR BLD AUTO: 46.3 %
HGB BLD-MCNC: 15 G/DL
INR PPP: 1
LYMPHOCYTES # BLD AUTO: 2.7 K/UL
LYMPHOCYTES NFR BLD: 32.1 %
MCH RBC QN AUTO: 29 PG
MCHC RBC AUTO-ENTMCNC: 32.5 G/DL
MCV RBC AUTO: 90 FL
MONOCYTES # BLD AUTO: 0.5 K/UL
MONOCYTES NFR BLD: 6.3 %
NEUTROPHILS # BLD AUTO: 4.7 K/UL
NEUTROPHILS NFR BLD: 56.2 %
PLATELET # BLD AUTO: 340 K/UL
PMV BLD AUTO: 7 FL
POTASSIUM SERPL-SCNC: 4.8 MMOL/L
PROTHROMBIN TIME: 10.1 SEC
RBC # BLD AUTO: 5.17 M/UL
SODIUM SERPL-SCNC: 137 MMOL/L
WBC # BLD AUTO: 8.4 K/UL

## 2018-09-03 PROCEDURE — 99283 EMERGENCY DEPT VISIT LOW MDM: CPT | Mod: 25

## 2018-09-03 PROCEDURE — 85025 COMPLETE CBC W/AUTO DIFF WBC: CPT

## 2018-09-03 PROCEDURE — 85610 PROTHROMBIN TIME: CPT

## 2018-09-03 PROCEDURE — 80048 BASIC METABOLIC PNL TOTAL CA: CPT

## 2018-09-03 PROCEDURE — 36415 COLL VENOUS BLD VENIPUNCTURE: CPT

## 2018-09-04 DIAGNOSIS — G89.29 OTHER CHRONIC PAIN: ICD-10-CM

## 2018-09-04 NOTE — ED PROVIDER NOTES
Encounter Date: 9/3/2018    SCRIBE #1 NOTE: I, Ky Kumar, am scribing for, and in the presence of, Dr. Pruitt.       History     Chief Complaint   Patient presents with    Hematemesis     pt states he has began coughing up blood about 30 ...had lung biopsy within the past week ...denies chest pain or SOB     09/03/2018  8:45 PM     Raymundo Richard is a 68 y.o. male who is presenting to ED for evaluation of coughing up blood 30 mins PTA. He states that he was watching tv when he began coughing up large amounts of blood. Denies chest pain, back pain, abd pain. Denies taking blood thinners. Pt has a past medical history of GERD and Renal cell carcinoma. Pt has a past surgical history that includes Hernia repair; Appendectomy; Nephrectomy; and BIOPSY, PROSTATE.       The history is provided by the patient.     Review of patient's allergies indicates:   Allergen Reactions    Other Nausea And Vomiting     SODIUM PENATHOL  CAUSES SEVERE N & V     Past Medical History:   Diagnosis Date    GERD (gastroesophageal reflux disease)     Renal cell carcinoma      Past Surgical History:   Procedure Laterality Date    APPENDECTOMY      HERNIA REPAIR      NEPHRECTOMY       History reviewed. No pertinent family history.  Social History     Tobacco Use    Smoking status: Never Smoker   Substance Use Topics    Alcohol use: No    Drug use: No     Review of Systems   Constitutional: Negative for fever.   HENT: Negative for congestion and nosebleeds.    Eyes: Negative for visual disturbance.   Respiratory: Positive for cough. Negative for wheezing.         Positive for hemoptysis.    Cardiovascular: Negative for chest pain.   Gastrointestinal: Negative for abdominal pain and nausea.   Genitourinary: Negative for dysuria.   Musculoskeletal: Negative for back pain and joint swelling.   Skin: Negative for rash.   Neurological: Negative for syncope.   Hematological: Does not bruise/bleed easily.   Psychiatric/Behavioral: Negative  for confusion.       Physical Exam     Initial Vitals [09/03/18 2032]   BP Pulse Resp Temp SpO2   (!) 147/79 82 16 98.5 °F (36.9 °C) 98 %      MAP       --         Physical Exam    Nursing note and vitals reviewed.  Constitutional: He appears well-developed and well-nourished. No distress.   HENT:   Head: Normocephalic and atraumatic.   No blood in the nares. No blood in the posterior oropharynx.    Eyes: Conjunctivae and EOM are normal. Pupils are equal, round, and reactive to light.   Neck: Neck supple.   Cardiovascular: Normal rate, regular rhythm and normal heart sounds. Exam reveals no gallop and no friction rub.    No murmur heard.  Pulmonary/Chest: No respiratory distress. He has wheezes. He has no rhonchi. He has rales.   Subtle wheezes and rales in the left lower bases.   Abdominal: Soft. Bowel sounds are normal. He exhibits no distension. There is no tenderness.   Musculoskeletal: Normal range of motion. He exhibits no edema or tenderness.   Neurological: He is alert and oriented to person, place, and time.   Skin: Skin is warm and dry.   Psychiatric: He has a normal mood and affect.         ED Course   Procedures  Labs Reviewed   CBC W/ AUTO DIFFERENTIAL   PROTIME-INR   BASIC METABOLIC PANEL          Imaging Results    None       X-Rays:   Independently Interpreted Readings:   Other Readings:  Chest x-ray shows no signs of pleural effusion or infiltrate.  It appears stable when compared to prior.    Medical Decision Making:   History:   Old Medical Records: I decided to obtain old medical records.  Clinical Tests:   Lab Tests: Ordered and Reviewed  Radiological Study: Ordered and Reviewed  Patient had transient him up this is likely from his recent lung biopsy.  Given that he has had no more significant hemoptysis, normal vital signs, normal oxygenation feel that he is stable for discharge at this time.  He is not on any anticoagulants.  His H&H and INR stable. He will go home with his family and call his  pulmonologist Dr. grewal her in the morning.  The chief complaint that hematemesis but it was actually hemoptysis.  He has had no hematemesis or melena or hematochezia according to the patient            Scribe Attestation:   Scribe #1: I performed the above scribed service and the documentation accurately describes the services I performed. I attest to the accuracy of the note.    I, Dr. George Pruitt personally performed the services described in this documentation. All medical record entries made by the scribe were at my direction and in my presence.  I have reviewed the chart and agree that the record reflects my personal performance and is accurate and complete. George Pruitt MD.  10:08 PM 09/03/2018    DISCLAIMER: This note was prepared with Dragon NaturallySpeaking voice recognition transcription software. Garbled syntax, mangled pronouns, and other bizarre constructions may be attributed to that software system            Clinical Impression:     1. Hemoptysis                                   George Pruitt MD  09/03/18 0238

## 2018-09-04 NOTE — TELEPHONE ENCOUNTER
----- Message from Gina Salomon sent at 9/4/2018 10:18 AM CDT -----  Contact: SELF  PT IS DUE FOR REFILL OF HYDROCODONE.

## 2018-09-06 RX ORDER — HYDROCODONE BITARTRATE AND ACETAMINOPHEN 7.5; 325 MG/1; MG/1
1 TABLET ORAL 3 TIMES DAILY PRN
Qty: 90 TABLET | Refills: 0 | Status: SHIPPED | OUTPATIENT
Start: 2018-09-06 | End: 2018-10-04 | Stop reason: SDUPTHER

## 2018-09-07 ENCOUNTER — TELEPHONE (OUTPATIENT)
Dept: UROLOGY | Facility: CLINIC | Age: 68
End: 2018-09-07

## 2018-09-07 NOTE — TELEPHONE ENCOUNTER
----- Message from Elder Sandy sent at 9/7/2018  8:35 AM CDT -----  Contact: pt's daughter   Pt's daughter is calling to schedule an appt with doctor ASAP(biospy for lung came back) nothing for me to schedule until October and did not want to wait that long, pls call back and advise   Call Back#[phone]  Thanks

## 2018-09-11 ENCOUNTER — OFFICE VISIT (OUTPATIENT)
Dept: UROLOGY | Facility: CLINIC | Age: 68
End: 2018-09-11
Payer: MEDICARE

## 2018-09-11 VITALS
BODY MASS INDEX: 24.65 KG/M2 | SYSTOLIC BLOOD PRESSURE: 144 MMHG | TEMPERATURE: 98 F | HEIGHT: 65 IN | RESPIRATION RATE: 18 BRPM | WEIGHT: 147.94 LBS | HEART RATE: 79 BPM | DIASTOLIC BLOOD PRESSURE: 83 MMHG

## 2018-09-11 DIAGNOSIS — C78.00 MALIGNANT NEOPLASM METASTATIC TO LUNG, UNSPECIFIED LATERALITY: ICD-10-CM

## 2018-09-11 DIAGNOSIS — C64.1 RENAL CELL CARCINOMA OF RIGHT KIDNEY: Primary | ICD-10-CM

## 2018-09-11 DIAGNOSIS — R97.20 ELEVATED PROSTATE SPECIFIC ANTIGEN (PSA): ICD-10-CM

## 2018-09-11 PROCEDURE — 99999 PR PBB SHADOW E&M-EST. PATIENT-LVL III: CPT | Mod: PBBFAC,,, | Performed by: UROLOGY

## 2018-09-11 PROCEDURE — 1101F PT FALLS ASSESS-DOCD LE1/YR: CPT | Mod: CPTII,,, | Performed by: UROLOGY

## 2018-09-11 PROCEDURE — 99213 OFFICE O/P EST LOW 20 MIN: CPT | Mod: PBBFAC,PN | Performed by: UROLOGY

## 2018-09-11 PROCEDURE — 99213 OFFICE O/P EST LOW 20 MIN: CPT | Mod: S$PBB,,, | Performed by: UROLOGY

## 2018-09-12 ENCOUNTER — TELEPHONE (OUTPATIENT)
Dept: HEMATOLOGY/ONCOLOGY | Facility: CLINIC | Age: 68
End: 2018-09-12

## 2018-09-12 ENCOUNTER — TELEPHONE (OUTPATIENT)
Dept: UROLOGY | Facility: CLINIC | Age: 68
End: 2018-09-12

## 2018-09-12 DIAGNOSIS — R91.1 PULMONARY NODULE: Primary | ICD-10-CM

## 2018-09-12 NOTE — PROGRESS NOTES
OFFICE NOTE    CHIEF COMPLAINT:  Microscopic hematuria, abnormal urine cytology.    HISTORY OF PRESENT ILLNESS:  This 76-year-old male returns for routine recheck.    He had undergone cystoscopy and bilateral retrograde pyelograms and biopsy and   fulguration of bladder lesion on 08/27/2018 to complete evaluation of the   microscopic hematuria.  The pathology report of the bladder biopsy confirmed it   to be of an inflammatory in nature and no evidence of any malignancy.  It must   be mentioned that a prior CT scan on 03/01/2018 was essentially unremarkable,   though there was a questionable renal density, but on ultrasound did not   visualize it.  The patient has been experiencing some right-sided back pain, but   it is affected by position and posture and it was mentioned to him this appears   to be most likely musculoskeletal and on examination, it is over the last   sacral area.    Other issues that the patient has been having problems with is with his   foreskin, but he states it is not a problem at this point and he wants to hold   off on any procedures or any circumcision at this time.    His last PSA was 1.4 on 03/20/2018.    UA, none given.    FINAL IMPRESSION:  Microscopic hematuria, BPH, questionable right renal density.    RECOMMENDATIONS:  Continue with observation; otherwise, routine recheck in six   months and he will also be due his PSA next year.      KHUSHI  dd: 09/11/2018 17:48:49 (CDT)  td: 09/12/2018 08:36:30 (ELIOT)  Doc ID   #[id number]  Job ID #[id number]    CC:

## 2018-09-12 NOTE — TELEPHONE ENCOUNTER
"Called and spoke to Dr Almeida to inquire why was patient dismissed from his practice, she states that the patient had multiple "no shows" and cancellation and he signed the dismissal paperwork. Message to be given to MD.  "

## 2018-09-12 NOTE — TELEPHONE ENCOUNTER
----- Message from Aundrea Guerrier LPN sent at 9/12/2018  3:11 PM CDT -----  Hello,this patient is beng referred to your office for Dr Anna from Dr Green for right kidney renal cell carcinoma, can you please contact to schedule.        Thank you, Aundrea JORDAN

## 2018-09-12 NOTE — TELEPHONE ENCOUNTER
Message left instructing patient to call [phone] ext [phone] to schedule referral appointment.

## 2018-09-12 NOTE — PROGRESS NOTES
OFFICE NOTE     CHIEF COMPLAINT:  Renal cell carcinoma with lung metastasis.    HISTORY OF PRESENT ILLNESS:  This 68-year-old male returns for routine recheck.    He has a history of renal cell carcinoma for which he underwent a right radical   nephrectomy in 2013.  He has been followed by his oncologist, Dr. Almeida, and   recent CT scan of the chest and abdomen did reveal evidence of pulmonary   nodules for which he underwent a lung biopsy by Dr. Diop on 08/28/2018, and   the pathology report revealed it to be consistent with metastatic renal cell   carcinoma.  The patient is coming to us to discuss further management of this.    I did mention that we will need to refer him to Dr. Almeida, his oncologist, to   decide on further treatment options.  It must also be mentioned this patient is   also being followed for an elevated PSA that was most recently 14.0 on   07/24/2018.  He has undergone prostate ultrasound with biopsies in 2012 and   there was no  evidence of any malignancy and an MRI of the prostate revealed a low   probability of prostatic carcinoma.  I also did mention that in view of the   findings of the metastatic renal cell carcinoma to the lung that we will hold   off on any further consideration for any biopsies of the prostate or any other   management of the prostate until he is evaluated by Dr. Almeida.    FINAL IMPRESSION:  Renal cell carcinoma with lung metastasis, history of   elevated PSA.    RECOMMENDATIONS:  The patient is being referred to have followup with Dr. Almeida in view of the lung metastasis from the renal cell carcinoma.    Subsequently, he will follow up with us.      KHUSHI  dd: 09/11/2018 18:07:13 (CDT)  td: 09/12/2018 08:53:43 (CDT)  Doc ID   #[id number]  Job ID #[id number]    CC:

## 2018-09-18 ENCOUNTER — INITIAL CONSULT (OUTPATIENT)
Dept: HEMATOLOGY/ONCOLOGY | Facility: CLINIC | Age: 68
End: 2018-09-18
Payer: MEDICARE

## 2018-09-18 VITALS
SYSTOLIC BLOOD PRESSURE: 134 MMHG | WEIGHT: 149.25 LBS | BODY MASS INDEX: 24.87 KG/M2 | HEIGHT: 65 IN | HEART RATE: 74 BPM | DIASTOLIC BLOOD PRESSURE: 68 MMHG | RESPIRATION RATE: 17 BRPM | TEMPERATURE: 99 F

## 2018-09-18 DIAGNOSIS — R91.1 PULMONARY NODULE: Primary | ICD-10-CM

## 2018-09-18 DIAGNOSIS — C64.9 SECONDARY RENAL CELL CARCINOMA OF LUNG, UNSPECIFIED LATERALITY: ICD-10-CM

## 2018-09-18 DIAGNOSIS — R97.20 ELEVATED PSA: ICD-10-CM

## 2018-09-18 DIAGNOSIS — C78.00 SECONDARY RENAL CELL CARCINOMA OF LUNG, UNSPECIFIED LATERALITY: ICD-10-CM

## 2018-09-18 PROCEDURE — 99999 PR PBB SHADOW E&M-EST. PATIENT-LVL III: CPT | Mod: PBBFAC,,, | Performed by: INTERNAL MEDICINE

## 2018-09-18 PROCEDURE — 99213 OFFICE O/P EST LOW 20 MIN: CPT | Mod: PBBFAC,PO | Performed by: INTERNAL MEDICINE

## 2018-09-18 PROCEDURE — 99205 OFFICE O/P NEW HI 60 MIN: CPT | Mod: S$PBB,,, | Performed by: INTERNAL MEDICINE

## 2018-09-18 PROCEDURE — 1101F PT FALLS ASSESS-DOCD LE1/YR: CPT | Mod: CPTII,,, | Performed by: INTERNAL MEDICINE

## 2018-09-18 NOTE — LETTER
September 18, 2018      Ne Green MD  [address] [place] [address]   [address]  [facility] [address]           [facility]  [address], [address]  [facility] [address]  Phone: [phone]          Patient: Raymundo Richard   MR Number: [id number]   YOB: 1950   Date of Visit: 9/18/2018       Dear Dr. Ne Green:    Thank you for referring Raymundo Richard to me for evaluation. Attached you will find relevant portions of my assessment and plan of care.    If you have questions, please do not hesitate to call me. I look forward to following Raymundo Richard along with you.    Sincerely,    Nikia Anna MD    Enclosure  CC:  No Recipients    If you would like to receive this communication electronically, please contact [email or web address] or [phone] to request more information on [employer] Link access.    For providers and/or their staff who would like to refer a patient to [facility], please contact us through our one-stop-shop provider referral line, [facility], at [phone].    If you feel you have received this communication in error or would no longer like to receive these types of communications, please e-mail [email or web address]

## 2018-09-18 NOTE — PROGRESS NOTES
CC I have cancer     Raymundo Richard is a 68 y.o.  This is a 60-year-old gentleman who has a history of renal cell carcinoma for which he underwent a right radical   nephrectomy in 2013.  He had been followed by his oncologist, Dr. Almeida, and   recent CT scan of the chest and abdomen ordered by his urologist did reveal evidence of pulmonary   nodules for which he underwent a lung biopsy by Dr. Diop on 08/28/2018, and   the pathology report revealed it to be consistent with metastatic renal cell   carcinoma.  Unsure if this is a clear cell or non clear cell.   Pt had presented to ER with hemoptysis as well   Patient also has a history of a high PSA with the last PSA being 14  on July 24, 2018.    Prostate biopsies in 2012 which showed no malignancy and for now Urology will proceed with observation alone       Pt was discharged from Dr Almeida's practice for multiple no shows in the past     Office visit prior notes reviewed.  Daughter is a with the patient today invasive made this appointment to get a referral to [facility].    Past Medical History:   Diagnosis Date    GERD (gastroesophageal reflux disease)     Renal cell carcinoma      Past Surgical History:   Procedure Laterality Date    APPENDECTOMY      BIOPSY, PROSTATE N/A 11/15/2012    Performed by Ne Green [facility] at [facility] OR    HERNIA REPAIR      NEPHRECTOMY         Current Outpatient Medications:     betamethasone dipropionate (DIPROLENE) 0.05 % cream, APPLY TO HANDS TWICE A DAY AS NEEDED, Disp: , Rfl: 0    finasteride (PROSCAR) 5 mg tablet, Take 1 tablet (5 mg total) by mouth once daily., Disp: 30 tablet, Rfl: 11    HYDROcodone-acetaminophen (NORCO) 7.5-325 mg per tablet, Take 1 tablet by mouth 3 (three) times daily as needed., Disp: 90 tablet, Rfl: 0    pantoprazole (PROTONIX) 40 MG tablet, Take 1 tablet (40 mg total) by mouth once daily., Disp: 90 tablet, Rfl: 3    ranitidine (ZANTAC) 150 MG tablet, Take 150 mg by mouth nightly. , Disp:  ", Rfl:     saw palmetto frt/phytosterol 2 (PROSTATE SR) 160-250 mg Cap, Take 1 capsule by mouth 2 (two) times daily., Disp: , Rfl:     sildenafil (REVATIO) 20 mg Tab, Take 1 tablet (20 mg total) by mouth as directed., Disp: 30 tablet, Rfl: 6    tamsulosin (FLOMAX) 0.4 mg Cap, TAKE ONE CAPSULE BY MOUTH ONCE DAILY, Disp: 90 capsule, Rfl: 1  Review of patient's allergies indicates:   Allergen Reactions    Other Nausea And Vomiting     SODIUM PENATHOL  CAUSES SEVERE N & V     Social History     Tobacco Use    Smoking status: Never Smoker   Substance Use Topics    Alcohol use: No    Drug use: No     No family history on file.    CONSTITUTIONAL: No fevers, chills, night sweats, wt. loss, appetite changes  SKIN: no rashes or itching  ENT: No headaches, head trauma, vision changes, or eye pain  LYMPH NODES: None noticed   CV: No chest pain, palpitations.   RESP: No dyspnea on exertion, cough, wheezing, or hemoptysis  GI: No nausea, emesis, diarrhea, constipation, melena, hematochezia, pain.   : No dysuria, hematuria, urgency, or frequency   HEME: No easy bruising, bleeding problems  PSYCHIATRIC: No depression, anxiety, psychosis, hallucinations.  NEURO: No seizures, memory loss, dizziness or difficulty sleeping  MSK: No arthralgias or joint swelling         /68   Pulse 74   Temp 98.7 °F (37.1 °C) (Oral)   Resp 17   Ht 5' 5" (1.651 m)   Wt 67.7 kg (149 lb 4 oz)   BMI 24.84 kg/m²   Gen: NAD, A and O x3,   Psych: pleasant affect, normal thought process  Eyes: Pupils round and non dilated, EOM intact  Nose: Nares patent  OP clear  Neck: suppple, no JVD, trachea midline  Lungs:no wheezes, no use of accessory muscles  CV: RRR, No mrg  Abd: ND,  No HSM, no ascites  Extr: No CCE, BEBE, strength normal, good capillary refill  Neuro: steady gait, CNs grossly intact  Skin: intact, no lesions noted  Rheum: No joint swelling   Ref Range & Units 1mo ago   PSA DIAGNOSTIC 0.00 - 4.00 ng/mL 14.0 Abnormally high     [facility] " UNKNOWN RAD [facility]   Order: [id number]   Status:  Final result   Visible to patient:  No (Not Released) Next appt:  Today at 01:00 PM in Hematology and Oncology (Nikia Anna MD)   Details     Reading Physician Reading Date Result Priority   Eagle Pineda MD 6/22/2018       Narrative     Reason: Dizziness and giddiness M 54.5, R 42    TECHNIQUE: MRI brain without IV contrast    COMPARISON: Diffusion-weighted imaging is normal.    Isolated punctate focus of T2 hyperintensity affects the posterolateral left  frontal lobe subcortical white matter, nonspecific,    FINDINGS:  Diffusion-weighted imaging is normal.    Isolated punctate focus of T2 hyperintensity affect the posterolateral left  frontal lobe subcortical white matter, nonspecific, likely indicating gliosis.  Gray and white matter are otherwise normal. No intracranial mass effect or  midline shift. Ventricles and cisterns are maintained.    Intracranial flow voids are normal. Calvarial bone marrow signal is normal.  Mild left maxillary sinus also thickening present.    IMPRESSION:  1. Essentially negative noncontrast MRI brain.  2. Isolated punctate focus of T2 hyperintensity in the left frontal lobe  subcortical white matter suggesting gliosis, and most likely related to chronic  small vessel ischemic change.  3. Mild left maxillary sinus disease.    Read and electronically signed by: Eagle Pineda MD on 6/22/2018 2:49 PM CDT      EAGLE PINEDA MD            Specimen Collected: 06/22/18 13:52 Last Resulted: 06/22/18 14:51 Order Details View Encounter Lab and Collection Details Routing Result History            External Result Report     External Result Report   Narrative     Reason: Dizziness and giddiness M 54.5, R 42    TECHNIQUE: MRI brain without IV contrast    COMPARISON: Diffusion-weighted imaging is normal.    Isolated punctate focus of T2 hyperintensity affects the posterolateral left  frontal lobe subcortical white matter,  nonspecific,    FINDINGS:  Diffusion-weighted imaging is normal.    Isolated punctate focus of T2 hyperintensity affect the posterolateral left  frontal lobe subcortical white matter, nonspecific, likely indicating gliosis.  Gray and white matter are otherwise normal. No intracranial mass effect or  midline shift. Ventricles and cisterns are maintained.    Intracranial flow voids are normal. Calvarial bone marrow signal is normal.  Mild left maxillary sinus also thickening present.    IMPRESSION:  1. Essentially negative noncontrast MRI brain.  2. Isolated punctate focus of T2 hyperintensity in the left frontal lobe  subcortical white matter suggesting gliosis, and most likely related to chronic  small vessel ischemic change.  3. Mild left maxillary sinus disease.    Read and electronically signed by: Eagle Pineda MD on 6/22/2018 2:49 PM CDT      EAGLE PINEDA MD    Encounter     View Encounter          Signed by           PACS Images      Show images for CT Abdomen Pelvis Without Contrast   Reviewed By List     CT Abdomen Pelvis  Without Contrast   Order: [id number]   Status:  Final result   Visible to patient:  No (Not Released) Next appt:  Today at 01:00 PM in Hematology and Oncology (Nikia Anna MD) Dx:  Renal cell carcinoma of right kidney   Details     Reading Physician Reading Date Result Priority   Abril Greco MD 4/13/2018       Narrative     EXAMINATION:  CT ABDOMEN PELVIS WITHOUT CONTRAST    CLINICAL HISTORY:  Renal cell cancer, staging; Malignant neoplasm of right kidney, except renal pelvis    TECHNIQUE:  Low dose axial images, sagittal and coronal reformations were obtained from the lung bases to the pubic symphysis.  .    COMPARISON:  7/18/2017    FINDINGS:  Visualized lung bases show few scattered nodules.  Largest in the right lung base measures 9 mm.  In the left lung base is difficult to distinguish from adjacent vessels but measures approximately 12 mm and appears mildly increased in  size compared to the prior chest CT of 7/18/2017.  The right lung nodule is in an area not included on the earlier abdominal CT of 6/268/2016.  Prior abdominal CT 6/28/2016 did not include the area of the right lung nodule.  The area of the left lung nodule is thought to be present on the cephalad most image and it also is increased in size compared to that exam.    There is calcified no left cardiophrenic angle.  There are no radiopaque stones in the gallbladder or CT findings of acute cholecystitis.  Patient is post right nephrectomy.  There is 1 mm and 3 mm stone in the left kidney.  The stone burden in the left kidney appears increased compared to the prior exam.  There is no hydronephrosis opaque left ureteral stone and left ureteral obstruction.  There is mild left perinephric stranding appearing chronic.  Abdominal aorta is non aneurysmal.  Spleen, liver, pancreas, adrenal glands as visualized on the noncontrast study are unremarkable in appearance.  The appendix is not identified but no abnormal appendix inflammatory changes appendiceal region is seen.  The prostate gland is enlarged measuring approximately 5 0.4 cm transversely, contains calcifications, deforms the adjacent urinary bladder.  There is mild diffuse bladder wall thickening.    There is diverticulosis without CT findings of acute diverticulitis.    There is no free intraperitoneal air or fluid.  There is no significant adenopathy    There are degenerative changes in osseous structures and sclerosis and ankylosis across sacroiliac joints.  No findings seen to indicate osseous metastatic disease..      Impression       Right nephrectomy with no findings suggesting metastatic disease or recurrent disease within the abdomen or pelvis.  Pulmonary nodules appearing and mildly increased in size compared to prior exam.  Suggest CT chest.    Additional findings as detailed above including a large prostate gland, diffuse bladder wall thickening,  diverticulosis without CT findings of acute diverticulitis, nonobstructing left renal stones.      Electronically signed by: Abril Greco MD  Date: 04/13/2018  Time: 11:04             Lab Results   Component Value Date    WBC 8.40 09/03/2018    HGB 15.0 09/03/2018    HCT 46.3 09/03/2018    MCV 90 09/03/2018     09/03/2018     CMP  Sodium   Date Value Ref Range Status   09/03/2018 137 136 - 145 mmol/L Final     Potassium   Date Value Ref Range Status   09/03/2018 4.8 3.5 - 5.1 mmol/L Final     Chloride   Date Value Ref Range Status   09/03/2018 106 95 - 110 mmol/L Final     CO2   Date Value Ref Range Status   09/03/2018 21 (L) 23 - 29 mmol/L Final     Glucose   Date Value Ref Range Status   09/03/2018 132 (H) 70 - 110 mg/dL Final     BUN, Bld   Date Value Ref Range Status   09/03/2018 26 (H) 8 - 23 mg/dL Final     Creatinine   Date Value Ref Range Status   09/03/2018 1.7 (H) 0.5 - 1.4 mg/dL Final   11/14/2012 0.9 0.5 - 1.4 mg/dL Final     Calcium   Date Value Ref Range Status   09/03/2018 10.5 8.7 - 10.5 mg/dL Final   11/14/2012 9.3 8.7 - 10.5 mg/dL Final     Total Protein   Date Value Ref Range Status   04/19/2018 7.7 6.0 - 8.4 g/dL Final     Albumin   Date Value Ref Range Status   04/19/2018 4.0 3.5 - 5.2 g/dL Final   10/03/2012 4.1 3.6 - 5.1 g/dL Final     Comment:     Albumin:  THIS TEST WAS PERFORMED AT:  [facility] [address] [address]  NIKKY Strickland MD, PhD     Total Bilirubin   Date Value Ref Range Status   04/19/2018 0.3 0.1 - 1.0 mg/dL Final     Comment:     For infants and newborns, interpretation of results should be based  on gestational age, weight and in agreement with clinical  observations.  Premature Infant recommended reference ranges:  Up to 24 hours.............<8.0 mg/dL  Up to 48 hours............<12.0 mg/dL  3-5 days..................<15.0 mg/dL  6-29 days.................<15.0 mg/dL       Alkaline Phosphatase   Date Value  Ref Range Status   04/19/2018 72 55 - 135 U/L Final   11/14/2012 57 55 - 135 U/L Final     AST   Date Value Ref Range Status   04/19/2018 27 10 - 40 U/L Final   11/14/2012 28 10 - 40 U/L Final     ALT   Date Value Ref Range Status   04/19/2018 26 10 - 44 U/L Final     Anion Gap   Date Value Ref Range Status   09/03/2018 10 8 - 16 mmol/L Final   11/14/2012 11 5 - 15 meq/L Final     eGFR if    Date Value Ref Range Status   09/03/2018 47 (A) >60 mL/min/1.73 m^2 Final     eGFR if non    Date Value Ref Range Status   09/03/2018 41 (A) >60 mL/min/1.73 m^2 Final     Comment:     Calculation used to obtain the estimated glomerular filtration  rate (eGFR) is the CKD-EPI equation.        SPECIMEN  1) left Lung Bx  FINAL PATHOLOGIC DIAGNOSIS  LEFT LUNG, BIOPSY:  Consistent with metastatic renal cell carcinoma  Patient has a past clinical history of renal cell carcinoma.  Immunostains EMA, RCC AND CD68 have been performed (with appropriate controls) and the results support the  above diagnosis.  Diagnosed by: Carmelita Gallegos M.D.  (Electronically Signed: 2018-09-05 09:02:01  Pulmonary nodule    Secondary renal cell carcinoma of lung, unspecified laterality    Elevated PSA      Explain stage IV renal cell carcinoma.  I also explained that I am unsure if he has clear cell or non clear cell since the pathology report did not explicitly say such.  Handouts were given according to NCCN guidelines.  I did explain this is an incurable disease.  Further labs would be needed to strategize him into a good her poor prognostic category.  Instructions on getting to [facility] have been explained.  Patient will have to work this out with his insurance company.  Oral therapy versus immunotherapy been discussed extensively.  Recommend further tissue testing with mutational analysis.  Discussed the dermis 1 clinical trial and standard of care therapy.  The physicians in [place] will discuss this further explore  clinical trials are available of their facility.  Patient should receive his care at that facility as we do not have clinical trial open the meets criteria for this patient at this time.    All questions were answered to the best my ability and which this patient well and does not ever to seek treatment  Thank you for allowing me to evaluate and participate in the care of this pleasant patient. Please fell free to call me with any questions or concerns.    Warmly,  Nikia Anna MD    This note was dictated with Dragon and briefly proofread. Please excuse any sentences that may be unclear or words misspelled

## 2018-09-19 ENCOUNTER — TELEPHONE (OUTPATIENT)
Dept: HEMATOLOGY/ONCOLOGY | Facility: CLINIC | Age: 68
End: 2018-09-19

## 2018-09-19 NOTE — TELEPHONE ENCOUNTER
----- Message from Narcisa Jimenez sent at 9/19/2018 11:40 AM CDT -----  Contact: Patient's niece, Kay Wright  Type:  Patient Returning Call    Who Called:  Patient's niece  Who Left Message for Patient:  Macy  Does the patient know what this is regarding?:    Best Call Back Number:    Additional Information:

## 2018-09-19 NOTE — TELEPHONE ENCOUNTER
Message left requesting prior auth to be started for a referral to [facility]. Call back number [phone] ext [phone] left.

## 2018-09-19 NOTE — TELEPHONE ENCOUNTER
----- Message from Jamila Diggs sent at 9/19/2018 10:25 AM CDT -----  Contact: Cheyanne with [employer]  Type:  Patient Returning Call    Who Called:  Cheyanne with [employer]  Who Left Message for Patient:  Joycelyn  Does the patient know what this is regarding?:  yes  Best Call Back Number:  [phone] ext [phone]  Additional Information:  She states Joycelyn is looking for a prior authorization for patient to go to [facility]. Treatment requires a medical necessity form, doctor's orders and clinical notes fax to them. Please fax to [phone]. Thanks!

## 2018-09-27 ENCOUNTER — TELEPHONE (OUTPATIENT)
Dept: HEMATOLOGY/ONCOLOGY | Facility: CLINIC | Age: 68
End: 2018-09-27

## 2018-09-27 NOTE — TELEPHONE ENCOUNTER
----- Message from Jamila Diggs sent at 9/27/2018 10:51 AM CDT -----  Contact: Shawn with [employer]   Shawn with [employer] is calling Tricia Alvarado for additional information regarding the referral to [facility]. marcel call Shawn at [phone]. Thanks!

## 2018-09-27 NOTE — TELEPHONE ENCOUNTER
Patient notified that [facility] does not take his insurance. Patient declines to make f/u appointment with Dr. Anna.

## 2018-09-27 NOTE — TELEPHONE ENCOUNTER
----- Message from Ray Bowers sent at 9/27/2018  1:27 PM CDT -----  Contact: Ronald/[employer]  Ronald called in and wanted to forward Tricia a correct fax number to send over paperwork on the attached patient.  Fax number is: [phone]

## 2018-09-27 NOTE — TELEPHONE ENCOUNTER
Shawn notified that [facility] stated that they did not take [facility] but information faxed as requested.

## 2018-10-04 DIAGNOSIS — G89.29 OTHER CHRONIC PAIN: ICD-10-CM

## 2018-10-04 RX ORDER — HYDROCODONE BITARTRATE AND ACETAMINOPHEN 7.5; 325 MG/1; MG/1
1 TABLET ORAL 3 TIMES DAILY PRN
Qty: 90 TABLET | Refills: 0 | Status: SHIPPED | OUTPATIENT
Start: 2018-10-04 | End: 2018-10-31 | Stop reason: SDUPTHER

## 2018-10-09 ENCOUNTER — TELEPHONE (OUTPATIENT)
Dept: UROLOGY | Facility: CLINIC | Age: 68
End: 2018-10-09

## 2018-10-09 NOTE — TELEPHONE ENCOUNTER
----- Message from Kailey Langford sent at 10/9/2018  1:48 PM CDT -----  Contact: dia galloway/ boo ojeda [phone]  please call dia galloway/ boo ojeda [phone] in regards to Trimix

## 2018-10-10 NOTE — TELEPHONE ENCOUNTER
----- Message from RT Rock sent at 10/10/2018 11:34 AM CDT -----  Contact: Emma,[phone], Cristhian Carter,[phone], Cristhian Aquino, requesting the strength for the pt's medication: Tri - mix, thanks.

## 2018-10-10 NOTE — TELEPHONE ENCOUNTER
Returned call, informed the Trimix RX is the same strength as previous order, 30 mg in 10 ml saline, she verbally understood.

## 2018-10-31 ENCOUNTER — OFFICE VISIT (OUTPATIENT)
Dept: GASTROENTEROLOGY | Facility: CLINIC | Age: 68
End: 2018-10-31
Payer: MEDICARE

## 2018-10-31 VITALS
HEART RATE: 81 BPM | WEIGHT: 155 LBS | BODY MASS INDEX: 25.83 KG/M2 | SYSTOLIC BLOOD PRESSURE: 142 MMHG | TEMPERATURE: 98 F | DIASTOLIC BLOOD PRESSURE: 72 MMHG | RESPIRATION RATE: 16 BRPM | HEIGHT: 65 IN

## 2018-10-31 DIAGNOSIS — C64.9 RENAL CELL CARCINOMA, UNSPECIFIED LATERALITY: ICD-10-CM

## 2018-10-31 DIAGNOSIS — K21.9 GASTROESOPHAGEAL REFLUX DISEASE, ESOPHAGITIS PRESENCE NOT SPECIFIED: Primary | ICD-10-CM

## 2018-10-31 DIAGNOSIS — M54.9 BACK PAIN, UNSPECIFIED BACK LOCATION, UNSPECIFIED BACK PAIN LATERALITY, UNSPECIFIED CHRONICITY: ICD-10-CM

## 2018-10-31 DIAGNOSIS — R35.1 BPH ASSOCIATED WITH NOCTURIA: ICD-10-CM

## 2018-10-31 DIAGNOSIS — N40.1 BPH ASSOCIATED WITH NOCTURIA: ICD-10-CM

## 2018-10-31 DIAGNOSIS — Z86.010 HX OF ADENOMATOUS COLONIC POLYPS: ICD-10-CM

## 2018-10-31 DIAGNOSIS — C78.00 SECONDARY RENAL CELL CARCINOMA OF LUNG, UNSPECIFIED LATERALITY: ICD-10-CM

## 2018-10-31 DIAGNOSIS — C64.9 SECONDARY RENAL CELL CARCINOMA OF LUNG, UNSPECIFIED LATERALITY: ICD-10-CM

## 2018-10-31 DIAGNOSIS — G89.29 OTHER CHRONIC PAIN: ICD-10-CM

## 2018-10-31 DIAGNOSIS — R91.1 PULMONARY NODULE: ICD-10-CM

## 2018-10-31 DIAGNOSIS — B18.2 CHRONIC HEPATITIS C WITHOUT HEPATIC COMA: ICD-10-CM

## 2018-10-31 PROCEDURE — 99215 OFFICE O/P EST HI 40 MIN: CPT | Mod: ,,, | Performed by: INTERNAL MEDICINE

## 2018-10-31 RX ORDER — HYDROCODONE BITARTRATE AND ACETAMINOPHEN 7.5; 325 MG/1; MG/1
1 TABLET ORAL 3 TIMES DAILY PRN
Qty: 90 TABLET | Refills: 0 | Status: SHIPPED | OUTPATIENT
Start: 2018-10-31 | End: 2018-11-30

## 2018-10-31 NOTE — PROGRESS NOTES
[facility] Established Patient Visit    Subjective:           PCP: Sekou Lara III    Chief Complaint: Follow-up       HPI:  Raymundo Richard is a 68 y.o. male here for follow up of chronic pain. He has back problems, for which he takes medications. He had a recent workup and there is suggestion the patient may have a metastatic malignancy in the lung. Patient wants to go to [facility] and get another opinion regarding further management. Spent several hours with the patient going over the various options and facilitating his appointment at [facility]. A detailed physical exam was done, which failed to reveal any fresh objective findings. Patient's nephrectomy was several years back.       ROS:   Constitutional: No fevers, chills, weight loss  ENT: No allergies, sore throat, rhinorrhea  CV: No chest pain, palpitations, edema  Pulm: No cough, shortness of breath, wheezing  Ophtho: No vision changes  GI: No blood in stools, change in bowel habits, nausea/vomiting  Denies alternating diarrhea/constipation.   Derm: No rash  Heme: No easy bruising or lymphadenopathy  MSK: No arthralgias or myalgias  : No dysuria, hematuria, frequency, polyuria, or flank tenderness  Endo: No hot or cold intolerance  Neuro: No syncope or seizure, or dizziness  Psych: No hallucination, depression or suicidal ideation      Medical History:  has a past medical history of GERD (gastroesophageal reflux disease) and Renal cell carcinoma.      Surgical History:  has a past surgical history that includes Hernia repair; Appendectomy; Nephrectomy; and BIOPSY, PROSTATE (N/A, 11/15/2012).    Family History: No family history on file.    Social History:   Social History     Tobacco Use    Smoking status: Never Smoker   Substance Use Topics    Alcohol use: No    Drug use: No       The patient's social and family histories were reviewed by me and updated in the appropriate section of the electronic medical record.    Allergies:  "  Review of patient's allergies indicates:   Allergen Reactions    Other Nausea And Vomiting     SODIUM PENATHOL  CAUSES SEVERE N & V         Medications:   Current Outpatient Medications   Medication Sig Dispense Refill    betamethasone dipropionate (DIPROLENE) 0.05 % cream APPLY TO HANDS TWICE A DAY AS NEEDED  0    finasteride (PROSCAR) 5 mg tablet Take 1 tablet (5 mg total) by mouth once daily. 30 tablet 11    HYDROcodone-acetaminophen (NORCO) 7.5-325 mg per tablet Take 1 tablet by mouth 3 (three) times daily as needed. 90 tablet 0    pantoprazole (PROTONIX) 40 MG tablet Take 1 tablet (40 mg total) by mouth once daily. 90 tablet 3    saw palmetto frt/phytosterol 2 (PROSTATE SR) 160-250 mg Cap Take 1 capsule by mouth 2 (two) times daily.      tamsulosin (FLOMAX) 0.4 mg Cap TAKE ONE CAPSULE BY MOUTH ONCE DAILY 90 capsule 1    sildenafil (REVATIO) 20 mg Tab Take 1 tablet (20 mg total) by mouth as directed. 30 tablet 6     No current facility-administered medications for this visit.      All medications and past history have been reviewed.        Objective:        Vital Signs:  Blood pressure (!) 142/72, pulse 81, temperature 97.9 °F (36.6 °C), resp. rate 16, height 5' 5" (1.651 m), weight 70.3 kg (155 lb). Body mass index is 25.79 kg/m².    Physical Exam:   General Appearance: Well appearing in no acute distress, well developed, well                nourished  Head: Normocephalic, without obvious abnormality  Eyes:  Pupils equal, round and reactive to light  Throat: Lips, mucosa, and tongue normal; teeth and gums normal  Lungs: CTA bilaterally in anterior and posterior fields, no wheezes, no crackles  Heart:  Regular rate and rhythm, no murmurs heard  Abdomen: Soft, non tender, non distended with positive bowel sounds in all four quadrants. No hepatosplenomegaly, ascites, or mass. Negative for succusion splash  : male   Extremities: No cyanosis, edema or deformity  Skin: No rash  Neurologic: Normal " exam    Labs:  Lab Results   Component Value Date    WBC 8.40 09/03/2018    HGB 15.0 09/03/2018    HCT 46.3 09/03/2018     09/03/2018    CHOL 157 01/09/2018    TRIG 81 01/09/2018    HDL 24 (L) 01/09/2018    ALT 26 04/19/2018    AST 27 04/19/2018     09/03/2018    K 4.8 09/03/2018     09/03/2018    CREATININE 1.7 (H) 09/03/2018    BUN 26 (H) 09/03/2018    CO2 21 (L) 09/03/2018    PSA 6.04 (H) 10/03/2012    INR 1.0 09/03/2018       Imaging:     All lab results and imaging results have been reviewed and discussed with the patient      Assessment:       No diagnosis found.    1. Chronic pain  2. GERD  3. H/O renal cell carcinoma  See visit diagnoses  Plan:       There are no diagnoses linked to this encounter.    See HPI    No Follow-up on file.    The plan was discussed with the patient and all questions/concerns have been answered to the patient's satisfaction.        Electronically signed by Gauri Sheets MD    This note was dictated using voice recognition software and may contain grammatical errors.

## 2018-11-28 ENCOUNTER — TELEPHONE (OUTPATIENT)
Dept: HEMATOLOGY/ONCOLOGY | Facility: CLINIC | Age: 68
End: 2018-11-28

## 2018-11-28 NOTE — TELEPHONE ENCOUNTER
----- Message from Jamila Diggs sent at 11/28/2018  3:31 PM CST -----  Contact: Kay clark  Patient's Kay clark needs first available appointment due to having severe stomach pain due to the chemo medication. Please call patient's nemireya at [phone]. Thanks!

## 2018-12-05 RX ORDER — HYDROCODONE BITARTRATE AND ACETAMINOPHEN 7.5; 325 MG/1; MG/1
1 TABLET ORAL EVERY 6 HOURS PRN
Qty: 90 TABLET | Refills: 0 | Status: SHIPPED | OUTPATIENT
Start: 2018-12-05 | End: 2019-01-16 | Stop reason: SDUPTHER

## 2018-12-05 RX ORDER — HYDROCODONE BITARTRATE AND ACETAMINOPHEN 10; 325 MG/1; MG/1
1 TABLET ORAL
Refills: 0 | Status: CANCELLED | OUTPATIENT
Start: 2018-12-05

## 2018-12-05 NOTE — TELEPHONE ENCOUNTER
----- Message from Gina Salomon sent at 12/5/2018  3:20 PM CST -----  Contact: self  Pt is due for refill of hydrocodone.

## 2018-12-28 ENCOUNTER — OFFICE VISIT (OUTPATIENT)
Dept: HEMATOLOGY/ONCOLOGY | Facility: CLINIC | Age: 68
End: 2018-12-28
Payer: MEDICARE

## 2018-12-28 ENCOUNTER — TELEPHONE (OUTPATIENT)
Dept: PHARMACY | Facility: CLINIC | Age: 68
End: 2018-12-28

## 2018-12-28 VITALS
SYSTOLIC BLOOD PRESSURE: 146 MMHG | HEART RATE: 74 BPM | WEIGHT: 155.44 LBS | DIASTOLIC BLOOD PRESSURE: 75 MMHG | HEIGHT: 65 IN | BODY MASS INDEX: 25.9 KG/M2 | TEMPERATURE: 98 F | RESPIRATION RATE: 16 BRPM

## 2018-12-28 DIAGNOSIS — C64.9 RENAL CELL CARCINOMA, UNSPECIFIED LATERALITY: Primary | ICD-10-CM

## 2018-12-28 DIAGNOSIS — C64.9 RENAL CELL CARCINOMA, UNSPECIFIED LATERALITY: ICD-10-CM

## 2018-12-28 DIAGNOSIS — R91.1 PULMONARY NODULE: Primary | ICD-10-CM

## 2018-12-28 DIAGNOSIS — C78.00 MALIGNANT NEOPLASM METASTATIC TO LUNG, UNSPECIFIED LATERALITY: ICD-10-CM

## 2018-12-28 PROCEDURE — 99999 PR PBB SHADOW E&M-EST. PATIENT-LVL III: CPT | Mod: PBBFAC,,, | Performed by: INTERNAL MEDICINE

## 2018-12-28 PROCEDURE — 99213 OFFICE O/P EST LOW 20 MIN: CPT | Mod: PBBFAC,PO | Performed by: INTERNAL MEDICINE

## 2018-12-28 PROCEDURE — 99215 OFFICE O/P EST HI 40 MIN: CPT | Mod: S$PBB,,, | Performed by: INTERNAL MEDICINE

## 2018-12-28 RX ORDER — SORAFENIB 200 MG/1
400 TABLET, FILM COATED ORAL 2 TIMES DAILY
Qty: 120 TABLET | Refills: 2 | Status: SHIPPED | OUTPATIENT
Start: 2018-12-28 | End: 2019-01-01

## 2018-12-28 NOTE — PROGRESS NOTES
CC I went to [facility] and ended up in the ER    Raymundo Richard is a 68 y.o.  This is a 60-year-old gentleman who has a history of renal cell carcinoma for which he underwent a right radical   nephrectomy in 2013.  He had been followed by his oncologist, Dr. Almeida, and   recent CT scan of the chest and abdomen ordered by his urologist did reveal evidence of pulmonary   nodules for which he underwent a lung biopsy by Dr. Diop on 08/28/2018, and   the pathology report revealed it to be consistent with metastatic renal cell   carcinoma.  Unsure if this is a clear cell or non clear cell.   Pt had presented to ER with hemoptysis as well   Patient also has a history of a high PSA with the last PSA being 14  on July 24, 2018.    Prostate biopsies in 2012 which showed no malignancy and for now Urology will proceed with observation alone       Pt was discharged from Dr Almeida's practice for multiple no shows in the past   He has been to [facility] and was placed on pazopanib. AFter taking this medicine for 8 days he developed severe gastritis and went to the eR . He was instructed to stop this medicine and see me   His symptoms have subsided  Office visit prior notes reviewed.  Daughter and son in law  Are  with the patient today     Past Medical History:   Diagnosis Date    GERD (gastroesophageal reflux disease)     Renal cell carcinoma      Past Surgical History:   Procedure Laterality Date    APPENDECTOMY      BIOPSY, PROSTATE N/A 11/15/2012    Performed [facility]    HERNIA REPAIR      NEPHRECTOMY         Current Outpatient Medications:     betamethasone dipropionate (DIPROLENE) 0.05 % cream, APPLY TO HANDS TWICE A DAY AS NEEDED, Disp: , Rfl: 0    finasteride (PROSCAR) 5 mg tablet, Take 1 tablet (5 mg total) by mouth once daily., Disp: 30 tablet, Rfl: 11    HYDROcodone-acetaminophen (NORCO) 7.5-325 mg per tablet, Take 1 tablet by mouth every 6 (six) hours as needed for Pain.,  "Disp: 90 tablet, Rfl: 0    pantoprazole (PROTONIX) 40 MG tablet, Take 1 tablet (40 mg total) by mouth once daily., Disp: 90 tablet, Rfl: 3    saw palmetto frt/phytosterol 2 (PROSTATE SR) 160-250 mg Cap, Take 1 capsule by mouth 2 (two) times daily., Disp: , Rfl:     sildenafil (REVATIO) 20 mg Tab, Take 1 tablet (20 mg total) by mouth as directed., Disp: 30 tablet, Rfl: 6    tamsulosin (FLOMAX) 0.4 mg Cap, TAKE ONE CAPSULE BY MOUTH ONCE DAILY, Disp: 90 capsule, Rfl: 1    No family history on file.    CONSTITUTIONAL: No fevers, chills, night sweats, wt. loss, appetite changes  SKIN: no rashes or itching  ENT: No headaches, head trauma, vision changes, or eye pain  LYMPH NODES: None noticed   CV: No chest pain, palpitations.   RESP: No dyspnea on exertion, cough, wheezing, or hemoptysis  GI: No nausea, emesis, diarrhea, constipation, melena, pain.   : No dysuria, hematuria, urgency, or frequency   HEME: No easy bruising, bleeding problems  PSYCHIATRIC: No depression, anxiety, psychosis, hallucinations.  NEURO: No seizures, memory loss, dizziness or difficulty sleeping  MSK: No arthralgias or joint swelling         BP (!) 146/75   Pulse 74   Temp 97.7 °F (36.5 °C)   Resp 16   Ht 5' 5" (1.651 m)   Wt 70.5 kg (155 lb 6.8 oz)   BMI 25.86 kg/m²   Gen: NAD, A and O x3,   Psych: pleasant affect, normal thought process  Eyes: Pupils round and non dilated, EOM intact  Nose: Nares patent  OP clear  Neck: suppple, no JVD, trachea midline  Lungs:no wheezes, no use of accessory muscles  CV: RRR, No mrg  Abd: ND,  No HSM, no ascites  Extr: No CCE, BEBE, strength normal, good capillary refill  Neuro: steady gait, CNs grossly intact  Skin: intact, no lesions noted  Rheum: No joint swelling   Ref Range & Units 1mo ago   PSA DIAGNOSTIC 0.00 - 4.00 ng/mL 14.0 Abnormally high     [facility] UNKNOWN RAD EAP   Order: [id number]   Status:  Final result   Visible to patient:  No (Not Released) Next appt:  Today at 01:00 PM in Hematology " and Oncology (Nikia Anna MD)   Details     Reading Physician Reading Date Result Priority   Eagle Pineda MD 6/22/2018       Narrative     Reason: Dizziness and giddiness M 54.5, R 42    TECHNIQUE: MRI brain without IV contrast    COMPARISON: Diffusion-weighted imaging is normal.    Isolated punctate focus of T2 hyperintensity affects the posterolateral left  frontal lobe subcortical white matter, nonspecific,    FINDINGS:  Diffusion-weighted imaging is normal.    Isolated punctate focus of T2 hyperintensity affect the posterolateral left  frontal lobe subcortical white matter, nonspecific, likely indicating gliosis.  Gray and white matter are otherwise normal. No intracranial mass effect or  midline shift. Ventricles and cisterns are maintained.    Intracranial flow voids are normal. Calvarial bone marrow signal is normal.  Mild left maxillary sinus also thickening present.    IMPRESSION:  1. Essentially negative noncontrast MRI brain.  2. Isolated punctate focus of T2 hyperintensity in the left frontal lobe  subcortical white matter suggesting gliosis, and most likely related to chronic  small vessel ischemic change.  3. Mild left maxillary sinus disease.    Read and electronically signed by: Eagle Pineda MD on 6/22/2018 2:49 PM CDT      EAGLE PINEDA MD            Specimen Collected: 06/22/18 13:52 Last Resulted: 06/22/18 14:51 Order Details View Encounter Lab and Collection Details Routing Result History            External Result Report     External Result Report   Narrative     Reason: Dizziness and giddiness M 54.5, R 42    TECHNIQUE: MRI brain without IV contrast    COMPARISON: Diffusion-weighted imaging is normal.    Isolated punctate focus of T2 hyperintensity affects the posterolateral left  frontal lobe subcortical white matter, nonspecific,    FINDINGS:  Diffusion-weighted imaging is normal.    Isolated punctate focus of T2 hyperintensity affect the posterolateral left  frontal lobe subcortical  white matter, nonspecific, likely indicating gliosis.  Gray and white matter are otherwise normal. No intracranial mass effect or  midline shift. Ventricles and cisterns are maintained.    Intracranial flow voids are normal. Calvarial bone marrow signal is normal.  Mild left maxillary sinus also thickening present.    IMPRESSION:  1. Essentially negative noncontrast MRI brain.  2. Isolated punctate focus of T2 hyperintensity in the left frontal lobe  subcortical white matter suggesting gliosis, and most likely related to chronic  small vessel ischemic change.  3. Mild left maxillary sinus disease.    Read and electronically signed by: Eagle Pineda MD on 6/22/2018 2:49 PM CDT      EAGLE PINEDA MD    Encounter     View Encounter          Signed by           PACS Images      Show images for CT Abdomen Pelvis Without Contrast   Reviewed By List     CT Abdomen Pelvis  Without Contrast   Order: [id number]   Status:  Final result   Visible to patient:  No (Not Released) Next appt:  Today at 01:00 PM in Hematology and Oncology (Nikia Anna MD) Dx:  Renal cell carcinoma of right kidney   Details     Reading Physician Reading Date Result Priority   Abril Greco MD 4/13/2018       Narrative     EXAMINATION:  CT ABDOMEN PELVIS WITHOUT CONTRAST    CLINICAL HISTORY:  Renal cell cancer, staging; Malignant neoplasm of right kidney, except renal pelvis    TECHNIQUE:  Low dose axial images, sagittal and coronal reformations were obtained from the lung bases to the pubic symphysis.  .    COMPARISON:  7/18/2017    FINDINGS:  Visualized lung bases show few scattered nodules.  Largest in the right lung base measures 9 mm.  In the left lung base is difficult to distinguish from adjacent vessels but measures approximately 12 mm and appears mildly increased in size compared to the prior chest CT of 7/18/2017.  The right lung nodule is in an area not included on the earlier abdominal CT of 6/268/2016.  Prior abdominal CT  6/28/2016 did not include the area of the right lung nodule.  The area of the left lung nodule is thought to be present on the cephalad most image and it also is increased in size compared to that exam.    There is calcified no left cardiophrenic angle.  There are no radiopaque stones in the gallbladder or CT findings of acute cholecystitis.  Patient is post right nephrectomy.  There is 1 mm and 3 mm stone in the left kidney.  The stone burden in the left kidney appears increased compared to the prior exam.  There is no hydronephrosis opaque left ureteral stone and left ureteral obstruction.  There is mild left perinephric stranding appearing chronic.  Abdominal aorta is non aneurysmal.  Spleen, liver, pancreas, adrenal glands as visualized on the noncontrast study are unremarkable in appearance.  The appendix is not identified but no abnormal appendix inflammatory changes appendiceal region is seen.  The prostate gland is enlarged measuring approximately 5 0.4 cm transversely, contains calcifications, deforms the adjacent urinary bladder.  There is mild diffuse bladder wall thickening.    There is diverticulosis without CT findings of acute diverticulitis.    There is no free intraperitoneal air or fluid.  There is no significant adenopathy    There are degenerative changes in osseous structures and sclerosis and ankylosis across sacroiliac joints.  No findings seen to indicate osseous metastatic disease..      Impression       Right nephrectomy with no findings suggesting metastatic disease or recurrent disease within the abdomen or pelvis.  Pulmonary nodules appearing and mildly increased in size compared to prior exam.  Suggest CT chest.    Additional findings as detailed above including a large prostate gland, diffuse bladder wall thickening, diverticulosis without CT findings of acute diverticulitis, nonobstructing left renal stones.      Electronically signed by: Abril Greco MD  Date: 04/13/2018  Time:  11:04           Hospital follow up   Lab Results   Component Value Date    WBC 8.40 09/03/2018    HGB 15.0 09/03/2018    HCT 46.3 09/03/2018    MCV 90 09/03/2018     09/03/2018     CMP  Sodium   Date Value Ref Range Status   09/03/2018 137 136 - 145 mmol/L Final     Potassium   Date Value Ref Range Status   09/03/2018 4.8 3.5 - 5.1 mmol/L Final     Chloride   Date Value Ref Range Status   09/03/2018 106 95 - 110 mmol/L Final     CO2   Date Value Ref Range Status   09/03/2018 21 (L) 23 - 29 mmol/L Final     Glucose   Date Value Ref Range Status   09/03/2018 132 (H) 70 - 110 mg/dL Final     BUN, Bld   Date Value Ref Range Status   09/03/2018 26 (H) 8 - 23 mg/dL Final     Creatinine   Date Value Ref Range Status   09/03/2018 1.7 (H) 0.5 - 1.4 mg/dL Final   11/14/2012 0.9 0.5 - 1.4 mg/dL Final     Calcium   Date Value Ref Range Status   09/03/2018 10.5 8.7 - 10.5 mg/dL Final   11/14/2012 9.3 8.7 - 10.5 mg/dL Final     Total Protein   Date Value Ref Range Status   04/19/2018 7.7 6.0 - 8.4 g/dL Final     Albumin   Date Value Ref Range Status   04/19/2018 4.0 3.5 - 5.2 g/dL Final   10/03/2012 4.1 3.6 - 5.1 g/dL Final     Comment:     Albumin:  THIS TEST WAS PERFORMED AT:  [facility] [address] [address]  NIKKY tSrickland MD, PhD     Total Bilirubin   Date Value Ref Range Status   04/19/2018 0.3 0.1 - 1.0 mg/dL Final     Comment:     For infants and newborns, interpretation of results should be based  on gestational age, weight and in agreement with clinical  observations.  Premature Infant recommended reference ranges:  Up to 24 hours.............<8.0 mg/dL  Up to 48 hours............<12.0 mg/dL  3-5 days..................<15.0 mg/dL  6-29 days.................<15.0 mg/dL       Alkaline Phosphatase   Date Value Ref Range Status   04/19/2018 72 55 - 135 U/L Final   11/14/2012 57 55 - 135 U/L Final     AST   Date Value Ref Range Status   04/19/2018 27 10 - 40  U/L Final   11/14/2012 28 10 - 40 U/L Final     ALT   Date Value Ref Range Status   04/19/2018 26 10 - 44 U/L Final     Anion Gap   Date Value Ref Range Status   09/03/2018 10 8 - 16 mmol/L Final   11/14/2012 11 5 - 15 meq/L Final     eGFR if    Date Value Ref Range Status   09/03/2018 47 (A) >60 mL/min/1.73 m^2 Final     eGFR if non    Date Value Ref Range Status   09/03/2018 41 (A) >60 mL/min/1.73 m^2 Final     Comment:     Calculation used to obtain the estimated glomerular filtration  rate (eGFR) is the CKD-EPI equation.        SPECIMEN  1) left Lung Bx  FINAL PATHOLOGIC DIAGNOSIS  LEFT LUNG, BIOPSY:  Consistent with metastatic renal cell carcinoma  Patient has a past clinical history of renal cell carcinoma.  Immunostains EMA, RCC AND CD68 have been performed (with appropriate controls) and the results support the  above diagnosis.  Diagnosed by: Carmelita Gallegos M.D.  (Electronically Signed: 2018-09-05 09:02:01  Pulmonary nodule  -     CBC auto differential; Standing  -     CMP; Future; Expected date: 12/28/2018  -     Lactate dehydrogenase; Future; Expected date: 12/28/2018    Renal cell carcinoma, unspecified laterality  -     CBC auto differential; Standing  -     CMP; Future; Expected date: 12/28/2018  -     Lactate dehydrogenase; Future; Expected date: 12/28/2018  -     SORAfenib (NEXAVAR) 200 mg tablet; Take 2 tablets (400 mg total) by mouth 2 (two) times daily.  Dispense: 60 tablet; Refill: 2    Malignant neoplasm metastatic to lung, unspecified laterality  -     SORAfenib (NEXAVAR) 200 mg tablet; Take 2 tablets (400 mg total) by mouth 2 (two) times daily.  Dispense: 60 tablet; Refill: 2    reviewed records form [facility]  PET CT in 2 months   Labs before next visit   [facility] for sorafenib  Explain stage IV renal cell carcinoma. Lung mets   One area of abnormality in the pancreas  I am not convinced he has failed votrient  He may have had pancreatitis   Educated on  hydration   Decrease water   Start oral meds     All questions were answered to the best my ability and which this patient well and does not ever to seek treatment  Thank you for allowing me to evaluate and participate in the care of this pleasant patient. Please fell free to call me with any questions or concerns.    Warmly,  Nikia Anna MD    This note was dictated with Dragon and briefly proofread. Please excuse any sentences that may be unclear or words misspelled

## 2018-12-28 NOTE — TELEPHONE ENCOUNTER
Informed patient that [facility] received prescription for Nexavar 400mg twice daily.  Benefits investigation is required  [facility] will be back in contact once insurance determination is confirmed.

## 2018-12-28 NOTE — TELEPHONE ENCOUNTER
DOCUMENTATION ONLY:   Prior authorization for Nexavar approved from 12/30/2017 to 12/31/2019  Case ID: [id number]    Co-pay: $0.00    Patient Assistance IS NOT required. Sending to clinical pharmacist for  and shipment. LISA

## 2018-12-31 ENCOUNTER — TELEPHONE (OUTPATIENT)
Dept: PHARMACY | Facility: CLINIC | Age: 68
End: 2018-12-31

## 2018-12-31 RX ORDER — MULTIVIT WITH MINERALS/HERBS
TABLET ORAL
COMMUNITY
End: 2019-01-01

## 2018-12-31 NOTE — TELEPHONE ENCOUNTER
Initial Nexavar consult completed on 2018 . Nexavar (sorafenib) will be shipped on 2019 to arrive at patient's home on 1/3/2019 via [employer] [employer]. $ 0.00 copay. Patient will start Nexavar once directed by provider. Address confirmed. Confirmed 2 patient identifiers - name and . Therapy Appropriate.    Patient was counseled on the administration directions:  -Take 2 tablets (400mg) by mouth twice daily on an empty stomach, take either 1 hour before or 2 hours after meals.    -Do not chew, crush, or break the tablets.   If possible, patient was instructed tip the tablets from the RX bottle to the cap, and take directly from the cap to the mouth.  Patient may handle the medication with their hands if they wear with a latex or nitrile glove and wash their hands before and after handling the tablets.    Patient was counseled on the following possible side effects, which include, but are not limited to:  hypertension, fatigue, hand-foot syndrome, hair loss, skin rash, skin irritation, diarrhea, nausea, loss of appetite, mouth sores, increased risk for infection, and may bleed more easily.  Patient was given Eucerin cream for Hand-Foot Syndrome, and hydrocortisone cream for dermatitis.     DDIs:  Medication list reviewed, Acetaminophen may enhance the hepatotoxic effect of Nexavar    Patient was given 2 patient education handouts on how to handle oral chemotherapy and specific recommendations- do's and don'ts. Instructed the patient that if they have any remaining oral chemotherapy, not to flush down the toilet or throw away in the trash; the patient or caregiver should return the unused oral chemotherapy to either the clinic or to myself in the Pharmacy where the oral chemotherapy can be disposed of properly.     Patient confirmed understanding. Patient did not have additional questions.  Patient will start Nexavar once directed by provider. Consultation included: indication; goals of treatment; administration;  storage and handling; side effects; how to handle side effects; the importance of compliance; how to handle missed doses; the importance of laboratory monitoring; the importance of keeping all follow up appointments.  Patient understands to report any medication changes to [facility] and provider. All questions answered and addressed to patients satisfaction. I will f/u with her in 1 week from start, [facility] to contact patient in 3 weeks for refills.

## 2019-01-01 ENCOUNTER — TELEPHONE (OUTPATIENT)
Dept: UROLOGY | Facility: CLINIC | Age: 69
End: 2019-01-01

## 2019-01-01 ENCOUNTER — TELEPHONE (OUTPATIENT)
Dept: GASTROENTEROLOGY | Facility: CLINIC | Age: 69
End: 2019-01-01

## 2019-01-01 ENCOUNTER — HOSPITAL ENCOUNTER (OUTPATIENT)
Dept: RADIOLOGY | Facility: HOSPITAL | Age: 69
Discharge: HOME | End: 2019-10-25
Attending: FAMILY MEDICINE
Payer: MEDICARE

## 2019-01-01 ENCOUNTER — OFFICE VISIT (OUTPATIENT)
Dept: UROLOGY | Facility: CLINIC | Age: 69
End: 2019-01-01
Payer: MEDICARE

## 2019-01-01 VITALS
HEART RATE: 88 BPM | WEIGHT: 147.5 LBS | DIASTOLIC BLOOD PRESSURE: 82 MMHG | RESPIRATION RATE: 18 BRPM | TEMPERATURE: 98 F | SYSTOLIC BLOOD PRESSURE: 137 MMHG | HEIGHT: 65 IN | BODY MASS INDEX: 24.57 KG/M2

## 2019-01-01 DIAGNOSIS — G89.29 CHRONIC BACK PAIN, UNSPECIFIED BACK LOCATION, UNSPECIFIED BACK PAIN LATERALITY: ICD-10-CM

## 2019-01-01 DIAGNOSIS — M54.50 LOW BACK PAIN, NON-SPECIFIC: ICD-10-CM

## 2019-01-01 DIAGNOSIS — M54.9 CHRONIC BACK PAIN, UNSPECIFIED BACK LOCATION, UNSPECIFIED BACK PAIN LATERALITY: ICD-10-CM

## 2019-01-01 DIAGNOSIS — C64.9 RENAL CELL CARCINOMA, UNSPECIFIED LATERALITY: ICD-10-CM

## 2019-01-01 DIAGNOSIS — N52.9 ERECTILE DYSFUNCTION, UNSPECIFIED ERECTILE DYSFUNCTION TYPE: Primary | ICD-10-CM

## 2019-01-01 DIAGNOSIS — C64.9 RENAL CELL CARCINOMA, UNSPECIFIED LATERALITY: Primary | ICD-10-CM

## 2019-01-01 LAB
BILIRUB SERPL-MCNC: ABNORMAL MG/DL
BLOOD URINE, POC: ABNORMAL
COLOR, POC UA: YELLOW
GLUCOSE UR QL STRIP: ABNORMAL
KETONES UR QL STRIP: ABNORMAL
LEUKOCYTE ESTERASE URINE, POC: ABNORMAL
NITRITE, POC UA: ABNORMAL
PH, POC UA: 5.5
PROTEIN, POC: ABNORMAL
SPECIFIC GRAVITY, POC UA: 1.02
UROBILINOGEN, POC UA: 0.2

## 2019-01-01 PROCEDURE — 1101F PR PT FALLS ASSESS DOC 0-1 FALLS W/OUT INJ PAST YR: ICD-10-PCS | Mod: CPTII,S$GLB,, | Performed by: NURSE PRACTITIONER

## 2019-01-01 PROCEDURE — 99999 PR PBB SHADOW E&M-EST. PATIENT-LVL IV: ICD-10-PCS | Mod: PBBFAC,,, | Performed by: NURSE PRACTITIONER

## 2019-01-01 PROCEDURE — 99213 OFFICE O/P EST LOW 20 MIN: CPT | Mod: 25,S$GLB,, | Performed by: NURSE PRACTITIONER

## 2019-01-01 PROCEDURE — 81002 POCT URINE DIPSTICK WITHOUT MICROSCOPE: ICD-10-PCS | Mod: S$GLB,,, | Performed by: NURSE PRACTITIONER

## 2019-01-01 PROCEDURE — 81002 URINALYSIS NONAUTO W/O SCOPE: CPT | Mod: S$GLB,,, | Performed by: NURSE PRACTITIONER

## 2019-01-01 PROCEDURE — 72110 X-RAY EXAM L-2 SPINE 4/>VWS: CPT | Mod: TC

## 2019-01-01 PROCEDURE — 99213 PR OFFICE/OUTPT VISIT, EST, LEVL III, 20-29 MIN: ICD-10-PCS | Mod: 25,S$GLB,, | Performed by: NURSE PRACTITIONER

## 2019-01-01 PROCEDURE — 99999 PR PBB SHADOW E&M-EST. PATIENT-LVL IV: CPT | Mod: PBBFAC,,, | Performed by: NURSE PRACTITIONER

## 2019-01-01 PROCEDURE — 1101F PT FALLS ASSESS-DOCD LE1/YR: CPT | Mod: CPTII,S$GLB,, | Performed by: NURSE PRACTITIONER

## 2019-01-01 RX ORDER — HYDROCODONE BITARTRATE AND ACETAMINOPHEN 7.5; 325 MG/1; MG/1
1 TABLET ORAL EVERY 6 HOURS PRN
Qty: 90 TABLET | Refills: 0 | Status: SHIPPED | OUTPATIENT
Start: 2019-01-01 | End: 2019-01-01 | Stop reason: SDUPTHER

## 2019-01-01 RX ORDER — HYDROCODONE BITARTRATE AND ACETAMINOPHEN 10; 325 MG/1; MG/1
1 TABLET ORAL EVERY 8 HOURS PRN
Qty: 90 TABLET | Refills: 0 | Status: SHIPPED | OUTPATIENT
Start: 2019-01-01 | End: 2020-01-01 | Stop reason: SDUPTHER

## 2019-01-01 RX ORDER — HYDROCODONE BITARTRATE AND ACETAMINOPHEN 7.5; 325 MG/1; MG/1
1 TABLET ORAL EVERY 6 HOURS PRN
Qty: 90 TABLET | Refills: 0 | Status: SHIPPED | OUTPATIENT
Start: 2019-01-01 | End: 2019-01-01

## 2019-01-02 ENCOUNTER — TELEPHONE (OUTPATIENT)
Dept: HEMATOLOGY/ONCOLOGY | Facility: CLINIC | Age: 69
End: 2019-01-02

## 2019-01-02 NOTE — TELEPHONE ENCOUNTER
----- Message from Artur Lomas, Himanshu sent at 12/31/2018 11:50 AM CST -----  Regarding: Nexavar start  Patient will receive Nexavar on 1/3/2019 and plans to start once directed by Dr Anna.

## 2019-01-15 ENCOUNTER — TELEPHONE (OUTPATIENT)
Dept: PHARMACY | Facility: CLINIC | Age: 69
End: 2019-01-15

## 2019-01-15 NOTE — TELEPHONE ENCOUNTER
"Spoke with Mr. Richard. He confirmed starting Nexavar on 1/3. Takes 2 tabs QAM and 2 QPM. "I lay them out with all of my other meds." He touches the medication with his bare hands, but washes them after each dose. He denies any s/es, missed doses, new meds, or other clinical changes. RPh will f/u in 2 cycles or sooner if clinically appropriate.  "

## 2019-01-16 RX ORDER — HYDROCODONE BITARTRATE AND ACETAMINOPHEN 7.5; 325 MG/1; MG/1
1 TABLET ORAL EVERY 6 HOURS PRN
Qty: 90 TABLET | Refills: 0 | Status: SHIPPED | OUTPATIENT
Start: 2019-01-16 | End: 2019-02-06 | Stop reason: SDUPTHER

## 2019-01-16 RX ORDER — HYDROCODONE BITARTRATE AND ACETAMINOPHEN 7.5; 325 MG/1; MG/1
1 TABLET ORAL EVERY 6 HOURS PRN
Qty: 90 TABLET | Refills: 0 | OUTPATIENT
Start: 2019-01-16

## 2019-01-16 NOTE — TELEPHONE ENCOUNTER
----- Message from Gina Salomon sent at 1/16/2019  8:19 AM CST -----  Contact: GINA CONCEPCION CALLED LAST WEEK FOR REFILL OF HYDROCODONE.  I THOUGHT I PUT A MESSAGE BACK BUT I DONT SEE IT.  PLEASE LET ME KNOW WHEN THIS IS DONE SO I CAN CALL THE PATIENT.

## 2019-01-21 ENCOUNTER — LAB VISIT (OUTPATIENT)
Dept: LAB | Facility: HOSPITAL | Age: 69
End: 2019-01-21
Attending: INTERNAL MEDICINE
Payer: MEDICARE

## 2019-01-21 DIAGNOSIS — R91.1 PULMONARY NODULE: ICD-10-CM

## 2019-01-21 DIAGNOSIS — C64.9 RENAL CELL CARCINOMA, UNSPECIFIED LATERALITY: ICD-10-CM

## 2019-01-21 LAB
BASOPHILS # BLD AUTO: 0 K/UL
BASOPHILS NFR BLD: 0.1 %
DIFFERENTIAL METHOD: ABNORMAL
EOSINOPHIL # BLD AUTO: 0.2 K/UL
EOSINOPHIL NFR BLD: 3 %
ERYTHROCYTE [DISTWIDTH] IN BLOOD BY AUTOMATED COUNT: 13.1 %
HCT VFR BLD AUTO: 44.7 %
HGB BLD-MCNC: 14.9 G/DL
LYMPHOCYTES # BLD AUTO: 1.7 K/UL
LYMPHOCYTES NFR BLD: 25.9 %
MCH RBC QN AUTO: 29 PG
MCHC RBC AUTO-ENTMCNC: 33.3 G/DL
MCV RBC AUTO: 87 FL
MONOCYTES # BLD AUTO: 0.6 K/UL
MONOCYTES NFR BLD: 9.4 %
NEUTROPHILS # BLD AUTO: 4 K/UL
NEUTROPHILS NFR BLD: 61.6 %
PLATELET # BLD AUTO: 368 K/UL
PMV BLD AUTO: 7.4 FL
RBC # BLD AUTO: 5.12 M/UL
WBC # BLD AUTO: 6.5 K/UL

## 2019-01-21 PROCEDURE — 85025 COMPLETE CBC W/AUTO DIFF WBC: CPT

## 2019-01-21 PROCEDURE — 36415 COLL VENOUS BLD VENIPUNCTURE: CPT

## 2019-01-23 ENCOUNTER — LAB VISIT (OUTPATIENT)
Dept: LAB | Facility: HOSPITAL | Age: 69
End: 2019-01-23
Attending: INTERNAL MEDICINE
Payer: MEDICARE

## 2019-01-23 DIAGNOSIS — R91.1 PULMONARY NODULE: ICD-10-CM

## 2019-01-23 DIAGNOSIS — C64.9 RENAL CELL CARCINOMA, UNSPECIFIED LATERALITY: ICD-10-CM

## 2019-01-23 LAB
ALBUMIN SERPL BCP-MCNC: 3.9 G/DL
ALP SERPL-CCNC: 90 U/L
ALT SERPL W/O P-5'-P-CCNC: 17 U/L
ANION GAP SERPL CALC-SCNC: 8 MMOL/L
AST SERPL-CCNC: 21 U/L
BASOPHILS # BLD AUTO: 0 K/UL
BASOPHILS NFR BLD: 0.6 %
BILIRUB SERPL-MCNC: 0.5 MG/DL
BUN SERPL-MCNC: 20 MG/DL
CALCIUM SERPL-MCNC: 10.4 MG/DL
CHLORIDE SERPL-SCNC: 105 MMOL/L
CO2 SERPL-SCNC: 27 MMOL/L
CREAT SERPL-MCNC: 1.6 MG/DL
DIFFERENTIAL METHOD: ABNORMAL
EOSINOPHIL # BLD AUTO: 0.2 K/UL
EOSINOPHIL NFR BLD: 2.9 %
ERYTHROCYTE [DISTWIDTH] IN BLOOD BY AUTOMATED COUNT: 13.5 %
EST. GFR  (AFRICAN AMERICAN): 50 ML/MIN/1.73 M^2
EST. GFR  (NON AFRICAN AMERICAN): 44 ML/MIN/1.73 M^2
GLUCOSE SERPL-MCNC: 93 MG/DL
HCT VFR BLD AUTO: 45 %
HGB BLD-MCNC: 14.7 G/DL
LDH SERPL L TO P-CCNC: 157 U/L
LYMPHOCYTES # BLD AUTO: 1.9 K/UL
LYMPHOCYTES NFR BLD: 31.5 %
MCH RBC QN AUTO: 28.9 PG
MCHC RBC AUTO-ENTMCNC: 32.7 G/DL
MCV RBC AUTO: 89 FL
MONOCYTES # BLD AUTO: 0.5 K/UL
MONOCYTES NFR BLD: 7.9 %
NEUTROPHILS # BLD AUTO: 3.5 K/UL
NEUTROPHILS NFR BLD: 57.1 %
PLATELET # BLD AUTO: 357 K/UL
PMV BLD AUTO: 6.9 FL
POTASSIUM SERPL-SCNC: 4.8 MMOL/L
PROT SERPL-MCNC: 8.1 G/DL
RBC # BLD AUTO: 5.08 M/UL
SODIUM SERPL-SCNC: 140 MMOL/L
WBC # BLD AUTO: 6.2 K/UL

## 2019-01-23 PROCEDURE — 83615 LACTATE (LD) (LDH) ENZYME: CPT

## 2019-01-23 PROCEDURE — 80053 COMPREHEN METABOLIC PANEL: CPT

## 2019-01-23 PROCEDURE — 85025 COMPLETE CBC W/AUTO DIFF WBC: CPT

## 2019-01-23 PROCEDURE — 36415 COLL VENOUS BLD VENIPUNCTURE: CPT

## 2019-01-25 ENCOUNTER — TELEPHONE (OUTPATIENT)
Dept: PHARMACY | Facility: CLINIC | Age: 69
End: 2019-01-25

## 2019-01-25 NOTE — TELEPHONE ENCOUNTER
Refill call for Nexavar. Patient confirms the need of a refill. Will ship Nexavar on 19 with patient consent. Copay $8.50 at 004. Patient has 8 days supply remaining.  Patient has not started any new medications, 0 missed doses and denies new side effects.   Patient taking medication as prescribed, no changes in direction. Patient did not have any concerns or questions at this time.  & address verified.    Will call patient back on 19 to get [facility].

## 2019-02-06 RX ORDER — HYDROCODONE BITARTRATE AND ACETAMINOPHEN 7.5; 325 MG/1; MG/1
1 TABLET ORAL EVERY 6 HOURS PRN
Qty: 90 TABLET | Refills: 0 | Status: SHIPPED | OUTPATIENT
Start: 2019-02-06 | End: 2019-02-27 | Stop reason: SDUPTHER

## 2019-02-06 NOTE — TELEPHONE ENCOUNTER
----- Message from Gina Salomon sent at 2/6/2019  1:17 PM CST -----  Contact: SELF  PT IS DUE FOR REFILL OF HYDROCODONE.

## 2019-02-18 ENCOUNTER — OFFICE VISIT (OUTPATIENT)
Dept: HEMATOLOGY/ONCOLOGY | Facility: CLINIC | Age: 69
End: 2019-02-18
Payer: MEDICARE

## 2019-02-18 VITALS
SYSTOLIC BLOOD PRESSURE: 166 MMHG | WEIGHT: 155 LBS | BODY MASS INDEX: 25.83 KG/M2 | DIASTOLIC BLOOD PRESSURE: 77 MMHG | OXYGEN SATURATION: 97 % | TEMPERATURE: 99 F | HEART RATE: 74 BPM | HEIGHT: 65 IN | RESPIRATION RATE: 16 BRPM

## 2019-02-18 DIAGNOSIS — Z09 CHEMOTHERAPY FOLLOW-UP EXAMINATION: ICD-10-CM

## 2019-02-18 DIAGNOSIS — Z09 ONCOLOGY FOLLOW-UP ENCOUNTER: Primary | ICD-10-CM

## 2019-02-18 DIAGNOSIS — C64.9 RENAL CELL CARCINOMA, UNSPECIFIED LATERALITY: ICD-10-CM

## 2019-02-18 DIAGNOSIS — C78.00 MALIGNANT NEOPLASM METASTATIC TO LUNG, UNSPECIFIED LATERALITY: ICD-10-CM

## 2019-02-18 DIAGNOSIS — N28.9 RENAL INSUFFICIENCY: ICD-10-CM

## 2019-02-18 DIAGNOSIS — N40.0 BENIGN PROSTATIC HYPERPLASIA, UNSPECIFIED WHETHER LOWER URINARY TRACT SYMPTOMS PRESENT: ICD-10-CM

## 2019-02-18 PROCEDURE — 99215 OFFICE O/P EST HI 40 MIN: CPT | Mod: S$PBB,,, | Performed by: INTERNAL MEDICINE

## 2019-02-18 PROCEDURE — 99213 OFFICE O/P EST LOW 20 MIN: CPT | Mod: PBBFAC,PO | Performed by: INTERNAL MEDICINE

## 2019-02-18 PROCEDURE — 99999 PR PBB SHADOW E&M-EST. PATIENT-LVL III: ICD-10-PCS | Mod: PBBFAC,,, | Performed by: INTERNAL MEDICINE

## 2019-02-18 PROCEDURE — 99215 PR OFFICE/OUTPT VISIT, EST, LEVL V, 40-54 MIN: ICD-10-PCS | Mod: S$PBB,,, | Performed by: INTERNAL MEDICINE

## 2019-02-18 PROCEDURE — 99999 PR PBB SHADOW E&M-EST. PATIENT-LVL III: CPT | Mod: PBBFAC,,, | Performed by: INTERNAL MEDICINE

## 2019-02-18 NOTE — PROGRESS NOTES
CC I had my labs drawn     Raymundo Richard is a 68 y.o.  This is a 60-year-old gentleman who has a history of renal cell carcinoma for which he underwent a right radical   nephrectomy in 2013.  He had been followed by his oncologist, Dr. Almeida, and   recent CT scan of the chest and abdomen ordered by his urologist did reveal evidence of pulmonary   nodules for which he underwent a lung biopsy by Dr. Diop on 08/28/2018, and   the pathology report revealed it to be consistent with metastatic renal cell   carcinoma.  Unsure if this is a clear cell or non clear cell.   Pt had presented to ER with hemoptysis as well   Patient also has a history of a high PSA with the last PSA being 14  on July 24, 2018.    Prostate biopsies in 2012 which showed no malignancy and for now Urology will proceed with observation alone       Pt was discharged from Dr Almeida's practice for multiple no shows in the past   He has been to [facility] and was placed on pazopanib. AFter taking this medicine for 8 days he developed severe gastritis and went to the eR . He was instructed to stop this medicine and see me   He then started sorafenib and is here to assess his tolerance for such       Past Medical History:   Diagnosis Date    GERD (gastroesophageal reflux disease)     Renal cell carcinoma      Past Surgical History:   Procedure Laterality Date    APPENDECTOMY      BIOPSY, PROSTATE N/A 11/15/2012    Performed by Ne [facility]    HERNIA REPAIR      NEPHRECTOMY         Current Outpatient Medications:     b complex vitamins tablet, Take by mouth., Disp: , Rfl:     betamethasone dipropionate (DIPROLENE) 0.05 % cream, APPLY TO HANDS TWICE A DAY AS NEEDED, Disp: , Rfl: 0    finasteride (PROSCAR) 5 mg tablet, Take 1 tablet (5 mg total) by mouth once daily., Disp: 30 tablet, Rfl: 11    HYDROcodone-acetaminophen (NORCO) 7.5-325 mg per tablet, Take 1 tablet by mouth every 6 (six) hours as needed for Pain., Disp: 90  "tablet, Rfl: 0    pantoprazole (PROTONIX) 40 MG tablet, Take 1 tablet (40 mg total) by mouth once daily., Disp: 90 tablet, Rfl: 3    saw palmetto frt/phytosterol 2 (PROSTATE SR) 160-250 mg Cap, Take 1 capsule by mouth 2 (two) times daily., Disp: , Rfl:     SORAfenib (NEXAVAR) 200 mg tablet, Take 2 tablets (400 mg total) by mouth 2 (two) times daily., Disp: 120 tablet, Rfl: 2    tamsulosin (FLOMAX) 0.4 mg Cap, TAKE ONE CAPSULE BY MOUTH ONCE DAILY, Disp: 90 capsule, Rfl: 1    sildenafil (REVATIO) 20 mg Tab, Take 1 tablet (20 mg total) by mouth as directed., Disp: 30 tablet, Rfl: 6    No family history on file.    CONSTITUTIONAL: No fevers, chills, night sweats, wt. loss, appetite changes  SKIN: no rashes or itching  ENT: No headaches, head trauma, vision changes, Rhinorrhea and cough prior to finishing antibiotics : now stable   LYMPH NODES: None noticed   CV: No chest pain, palpitations.   RESP: No dyspnea on exertion, cough, hemoptysis  GI: No nausea, emesis, diarrhea, constipation, melena, pain.   : No dysuria, hematuria, urgency, or frequency   HEME: No easy bruising, bleeding problems  PSYCHIATRIC: No depression, anxiety, psychosis, hallucinations.  NEURO: No seizures, memory loss, difficulty sleeping  MSK: No arthralgias or joint swelling         BP (!) 166/77   Pulse 74   Temp 98.5 °F (36.9 °C)   Resp 16   Ht 5' 5" (1.651 m)   Wt 70.3 kg (155 lb 0.4 oz)   SpO2 97%   BMI 25.80 kg/m²     Constitutional: oriented to person, place, and time.  well-developed and well-nourished.   HENT:   Head: Normocephalic and atraumatic.   Right Ear: External ear normal.   Left Ear: External ear normal.   Nose: Nose normal.   Mouth/Throat: Oropharynx is clear and moist.   Eyes: Conjunctivae and EOM are normal. Pupils are equal, round  Neck: Normal range of motion. Neck supple. No thyromegaly present.   Cardiovascular: Normal rate, regular rhythm, normal heart sounds   No murmur heard.  Pulmonary/Chest: Effort normal " and breath sounds normal.  no wheezes.  no rales.   Abdominal: Soft. Bowel sounds are normal.  no mass. There is no tenderness. There is no rebound.   Musculoskeletal: Normal range of motion.  no edema or tenderness.   Lymphadenopathy:    no cervical adenopathy.   Neurological: alert and oriented to person, place, and time.   Skin: Skin is warm and dry. No rash noted.   Psychiatric: normal mood and affect.   behavior is normal.   Vitals reviewed.     Ref Range & Units 1mo ago   PSA DIAGNOSTIC 0.00 - 4.00 ng/mL 14.0 Abnormally high     [facility] UNKNOWN [facility]   Order: [id number]   Status:  Final result   Visible to patient:  No (Not Released) Next appt:  Today at 01:00 PM in Hematology and Oncology (Nikia Anna MD)   Details     Reading Physician Reading Date Result Priority   Lamonte Pineda MD 6/22/2018       Narrative     Reason: Dizziness and giddiness M 54.5, R 42    TECHNIQUE: MRI brain without IV contrast    COMPARISON: Diffusion-weighted imaging is normal.    Isolated punctate focus of T2 hyperintensity affects the posterolateral left  frontal lobe subcortical white matter, nonspecific,    FINDINGS:  Diffusion-weighted imaging is normal.    Isolated punctate focus of T2 hyperintensity affect the posterolateral left  frontal lobe subcortical white matter, nonspecific, likely indicating gliosis.  Gray and white matter are otherwise normal. No intracranial mass effect or  midline shift. Ventricles and cisterns are maintained.    Intracranial flow voids are normal. Calvarial bone marrow signal is normal.  Mild left maxillary sinus also thickening present.    IMPRESSION:  1. Essentially negative noncontrast MRI brain.  2. Isolated punctate focus of T2 hyperintensity in the left frontal lobe  subcortical white matter suggesting gliosis, and most likely related to chronic  small vessel ischemic change.  3. Mild left maxillary sinus disease.    Read and electronically signed by: Lamnote Pineda MD on 6/22/2018 2:49 PM  CDT      EAGLE PINEDA MD            Specimen Collected: 06/22/18 13:52 Last Resulted: 06/22/18 14:51 Order Details View Encounter Lab and Collection Details Routing Result History            External Result Report     External Result Report   Narrative     Reason: Dizziness and giddiness M 54.5, R 42    TECHNIQUE: MRI brain without IV contrast    COMPARISON: Diffusion-weighted imaging is normal.    Isolated punctate focus of T2 hyperintensity affects the posterolateral left  frontal lobe subcortical white matter, nonspecific,    FINDINGS:  Diffusion-weighted imaging is normal.    Isolated punctate focus of T2 hyperintensity affect the posterolateral left  frontal lobe subcortical white matter, nonspecific, likely indicating gliosis.  Gray and white matter are otherwise normal. No intracranial mass effect or  midline shift. Ventricles and cisterns are maintained.    Intracranial flow voids are normal. Calvarial bone marrow signal is normal.  Mild left maxillary sinus also thickening present.    IMPRESSION:  1. Essentially negative noncontrast MRI brain.  2. Isolated punctate focus of T2 hyperintensity in the left frontal lobe  subcortical white matter suggesting gliosis, and most likely related to chronic  small vessel ischemic change.  3. Mild left maxillary sinus disease.    Read and electronically signed by: Eagle Pineda MD on 6/22/2018 2:49 PM CDT      EAGLE PINEDA MD    Encounter     View Encounter          Signed by           PACS Images      Show images for CT Abdomen Pelvis Without Contrast   Reviewed By List     CT Abdomen Pelvis  Without Contrast   Order: [id number]   Status:  Final result   Visible to patient:  No (Not Released) Next appt:  Today at 01:00 PM in Hematology and Oncology (Nikia Anna MD) Dx:  Renal cell carcinoma of right kidney   Details     Reading Physician Reading Date Result Priority   Abril Greco MD 4/13/2018       Narrative     EXAMINATION:  CT ABDOMEN PELVIS WITHOUT  CONTRAST    CLINICAL HISTORY:  Renal cell cancer, staging; Malignant neoplasm of right kidney, except renal pelvis    TECHNIQUE:  Low dose axial images, sagittal and coronal reformations were obtained from the lung bases to the pubic symphysis.  .    COMPARISON:  7/18/2017    FINDINGS:  Visualized lung bases show few scattered nodules.  Largest in the right lung base measures 9 mm.  In the left lung base is difficult to distinguish from adjacent vessels but measures approximately 12 mm and appears mildly increased in size compared to the prior chest CT of 7/18/2017.  The right lung nodule is in an area not included on the earlier abdominal CT of 6/268/2016.  Prior abdominal CT 6/28/2016 did not include the area of the right lung nodule.  The area of the left lung nodule is thought to be present on the cephalad most image and it also is increased in size compared to that exam.    There is calcified no left cardiophrenic angle.  There are no radiopaque stones in the gallbladder or CT findings of acute cholecystitis.  Patient is post right nephrectomy.  There is 1 mm and 3 mm stone in the left kidney.  The stone burden in the left kidney appears increased compared to the prior exam.  There is no hydronephrosis opaque left ureteral stone and left ureteral obstruction.  There is mild left perinephric stranding appearing chronic.  Abdominal aorta is non aneurysmal.  Spleen, liver, pancreas, adrenal glands as visualized on the noncontrast study are unremarkable in appearance.  The appendix is not identified but no abnormal appendix inflammatory changes appendiceal region is seen.  The prostate gland is enlarged measuring approximately 5 0.4 cm transversely, contains calcifications, deforms the adjacent urinary bladder.  There is mild diffuse bladder wall thickening.    There is diverticulosis without CT findings of acute diverticulitis.    There is no free intraperitoneal air or fluid.  There is no significant  adenopathy    There are degenerative changes in osseous structures and sclerosis and ankylosis across sacroiliac joints.  No findings seen to indicate osseous metastatic disease..      Impression       Right nephrectomy with no findings suggesting metastatic disease or recurrent disease within the abdomen or pelvis.  Pulmonary nodules appearing and mildly increased in size compared to prior exam.  Suggest CT chest.    Additional findings as detailed above including a large prostate gland, diffuse bladder wall thickening, diverticulosis without CT findings of acute diverticulitis, nonobstructing left renal stones.      Electronically signed by: Abril Greco MD  Date: 04/13/2018  Time: 11:04           Hospital follow up   Lab Results   Component Value Date    WBC 6.20 01/23/2019    HGB 14.7 01/23/2019    HCT 45.0 01/23/2019    MCV 89 01/23/2019     (H) 01/23/2019     CMP  Sodium   Date Value Ref Range Status   01/23/2019 140 136 - 145 mmol/L Final     Potassium   Date Value Ref Range Status   01/23/2019 4.8 3.5 - 5.1 mmol/L Final     Chloride   Date Value Ref Range Status   01/23/2019 105 95 - 110 mmol/L Final     CO2   Date Value Ref Range Status   01/23/2019 27 23 - 29 mmol/L Final     Glucose   Date Value Ref Range Status   01/23/2019 93 70 - 110 mg/dL Final     BUN, Bld   Date Value Ref Range Status   01/23/2019 20 8 - 23 mg/dL Final     Creatinine   Date Value Ref Range Status   01/23/2019 1.6 (H) 0.5 - 1.4 mg/dL Final   11/14/2012 0.9 0.5 - 1.4 mg/dL Final     Calcium   Date Value Ref Range Status   01/23/2019 10.4 8.7 - 10.5 mg/dL Final   11/14/2012 9.3 8.7 - 10.5 mg/dL Final     Total Protein   Date Value Ref Range Status   01/23/2019 8.1 6.0 - 8.4 g/dL Final     Albumin   Date Value Ref Range Status   01/23/2019 3.9 3.5 - 5.2 g/dL Final   10/03/2012 4.1 3.6 - 5.1 g/dL Final     Comment:     Albumin:  THIS TEST WAS PERFORMED AT:  [facility]  [id number] [place] [address]  NIKKY Strickland MD, PhD     Total Bilirubin   Date Value Ref Range Status   01/23/2019 0.5 0.1 - 1.0 mg/dL Final     Comment:     For infants and newborns, interpretation of results should be based  on gestational age, weight and in agreement with clinical  observations.  Premature Infant recommended reference ranges:  Up to 24 hours.............<8.0 mg/dL  Up to 48 hours............<12.0 mg/dL  3-5 days..................<15.0 mg/dL  6-29 days.................<15.0 mg/dL       Alkaline Phosphatase   Date Value Ref Range Status   01/23/2019 90 55 - 135 U/L Final   11/14/2012 57 55 - 135 U/L Final     AST   Date Value Ref Range Status   01/23/2019 21 10 - 40 U/L Final   11/14/2012 28 10 - 40 U/L Final     ALT   Date Value Ref Range Status   01/23/2019 17 10 - 44 U/L Final     Anion Gap   Date Value Ref Range Status   01/23/2019 8 8 - 16 mmol/L Final   11/14/2012 11 5 - 15 meq/L Final     eGFR if    Date Value Ref Range Status   01/23/2019 50 (A) >60 mL/min/1.73 m^2 Final     eGFR if non    Date Value Ref Range Status   01/23/2019 44 (A) >60 mL/min/1.73 m^2 Final     Comment:     Calculation used to obtain the estimated glomerular filtration  rate (eGFR) is the CKD-EPI equation.        SPECIMEN  1) left Lung Bx  FINAL PATHOLOGIC DIAGNOSIS  LEFT LUNG, BIOPSY:  Consistent with metastatic renal cell carcinoma  Patient has a past clinical history of renal cell carcinoma.  Immunostains EMA, RCC AND CD68 have been performed (with appropriate controls) and the results support the  above diagnosis.    Oncology follow-up encounter    Renal cell carcinoma, unspecified laterality  -     CT Chest Abdomen Pelvis W W/O Contrast (XPD); Future; Expected date: 02/18/2019    Malignant neoplasm metastatic to lung, unspecified laterality  -     CT Chest Abdomen Pelvis W W/O Contrast (XPD); Future; Expected date: 02/18/2019    Chemotherapy follow-up examination    Benign prostatic  hyperplasia, unspecified whether lower urinary tract symptoms present    Renal insufficiency  -     Creatinine, serum; Future; Expected date: 02/18/2019      Renal insufficiency : creatinine 1.7 add a glass of pedialyte daily   No dose adjustment needed fro sorafenib  Ct due now to assess reponse to chemo   stage IV renal cell carcinoma. Lung mets   One area of abnormality in the pancreas: monitoring closely  Pt asymptomatic   Educated on hydration   Cont finasteride for BPH   Cont flomax for urinary frequency   Meds reviewed     RTC after scans   Repeat Crn before ct scan   Thank you for allowing me to evaluate and participate in the care of this pleasant patient. Please fell free to call me with any questions or concerns.    Nikia Mccauley MD    This note was dictated with Dragon and briefly proofread. Please excuse any sentences that may be unclear or words misspelled

## 2019-02-22 ENCOUNTER — TELEPHONE (OUTPATIENT)
Dept: PHARMACY | Facility: CLINIC | Age: 69
End: 2019-02-22

## 2019-02-27 DIAGNOSIS — G89.29 CHRONIC BACK PAIN, UNSPECIFIED BACK LOCATION, UNSPECIFIED BACK PAIN LATERALITY: Primary | ICD-10-CM

## 2019-02-27 DIAGNOSIS — M54.9 CHRONIC BACK PAIN, UNSPECIFIED BACK LOCATION, UNSPECIFIED BACK PAIN LATERALITY: Primary | ICD-10-CM

## 2019-02-27 RX ORDER — HYDROCODONE BITARTRATE AND ACETAMINOPHEN 7.5; 325 MG/1; MG/1
1 TABLET ORAL EVERY 6 HOURS PRN
Qty: 90 TABLET | Refills: 0 | Status: SHIPPED | OUTPATIENT
Start: 2019-02-27 | End: 2019-04-04 | Stop reason: SDUPTHER

## 2019-02-27 NOTE — TELEPHONE ENCOUNTER
----- Message from Gina Salomon sent at 2/27/2019  9:59 AM CST -----  Contact: self  Pt will be due for refill of hydrocodone next week.  Please let patient know when it is ready.

## 2019-03-04 ENCOUNTER — HOSPITAL ENCOUNTER (OUTPATIENT)
Dept: RADIOLOGY | Facility: HOSPITAL | Age: 69
Discharge: HOME | End: 2019-03-04
Attending: INTERNAL MEDICINE
Payer: MEDICARE

## 2019-03-04 DIAGNOSIS — C64.9 RENAL CELL CARCINOMA, UNSPECIFIED LATERALITY: ICD-10-CM

## 2019-03-04 DIAGNOSIS — C78.00 MALIGNANT NEOPLASM METASTATIC TO LUNG, UNSPECIFIED LATERALITY: ICD-10-CM

## 2019-03-04 PROCEDURE — 71260 CT THORAX DX C+: CPT | Mod: 26,,, | Performed by: RADIOLOGY

## 2019-03-04 PROCEDURE — 71260 CT CHEST ABDOMEN PELVIS WITH CONTRAST (XPD): ICD-10-PCS | Mod: 26,,, | Performed by: RADIOLOGY

## 2019-03-04 PROCEDURE — 25500020 PHARM REV CODE 255: Performed by: INTERNAL MEDICINE

## 2019-03-04 PROCEDURE — 74177 CT ABD & PELVIS W/CONTRAST: CPT | Mod: TC

## 2019-03-04 PROCEDURE — 74177 CT ABD & PELVIS W/CONTRAST: CPT | Mod: 26,,, | Performed by: RADIOLOGY

## 2019-03-04 PROCEDURE — 74177 CT CHEST ABDOMEN PELVIS WITH CONTRAST (XPD): ICD-10-PCS | Mod: 26,,, | Performed by: RADIOLOGY

## 2019-03-04 RX ADMIN — IOHEXOL 30 ML: 350 INJECTION, SOLUTION INTRAVENOUS at 04:03

## 2019-03-04 RX ADMIN — IOHEXOL 75 ML: 350 INJECTION, SOLUTION INTRAVENOUS at 04:03

## 2019-03-08 ENCOUNTER — OFFICE VISIT (OUTPATIENT)
Dept: HEMATOLOGY/ONCOLOGY | Facility: CLINIC | Age: 69
End: 2019-03-08
Payer: MEDICARE

## 2019-03-08 ENCOUNTER — TELEPHONE (OUTPATIENT)
Dept: PHARMACY | Facility: CLINIC | Age: 69
End: 2019-03-08

## 2019-03-08 VITALS
OXYGEN SATURATION: 97 % | TEMPERATURE: 99 F | SYSTOLIC BLOOD PRESSURE: 127 MMHG | HEART RATE: 92 BPM | HEIGHT: 65 IN | RESPIRATION RATE: 22 BRPM | BODY MASS INDEX: 25.2 KG/M2 | DIASTOLIC BLOOD PRESSURE: 77 MMHG | WEIGHT: 151.25 LBS

## 2019-03-08 DIAGNOSIS — Z09 CHEMOTHERAPY FOLLOW-UP EXAMINATION: Primary | ICD-10-CM

## 2019-03-08 DIAGNOSIS — C79.51 OSSEOUS METASTASIS: ICD-10-CM

## 2019-03-08 DIAGNOSIS — C78.02 BILATERAL PULMONARY METASTASES: ICD-10-CM

## 2019-03-08 DIAGNOSIS — K86.89 PANCREATIC MASS: ICD-10-CM

## 2019-03-08 DIAGNOSIS — C64.9 RENAL CELL CARCINOMA, UNSPECIFIED LATERALITY: ICD-10-CM

## 2019-03-08 DIAGNOSIS — Z09 ONCOLOGY FOLLOW-UP ENCOUNTER: ICD-10-CM

## 2019-03-08 DIAGNOSIS — N40.0 BENIGN PROSTATIC HYPERPLASIA, UNSPECIFIED WHETHER LOWER URINARY TRACT SYMPTOMS PRESENT: ICD-10-CM

## 2019-03-08 DIAGNOSIS — C78.01 BILATERAL PULMONARY METASTASES: ICD-10-CM

## 2019-03-08 DIAGNOSIS — C78.00 MALIGNANT NEOPLASM METASTATIC TO LUNG, UNSPECIFIED LATERALITY: ICD-10-CM

## 2019-03-08 DIAGNOSIS — F11.90 OPIOID USE: ICD-10-CM

## 2019-03-08 PROCEDURE — 99213 OFFICE O/P EST LOW 20 MIN: CPT | Mod: PBBFAC,PO | Performed by: INTERNAL MEDICINE

## 2019-03-08 PROCEDURE — 99215 OFFICE O/P EST HI 40 MIN: CPT | Mod: S$PBB,,, | Performed by: INTERNAL MEDICINE

## 2019-03-08 PROCEDURE — 99215 PR OFFICE/OUTPT VISIT, EST, LEVL V, 40-54 MIN: ICD-10-PCS | Mod: S$PBB,,, | Performed by: INTERNAL MEDICINE

## 2019-03-08 PROCEDURE — 99999 PR PBB SHADOW E&M-EST. PATIENT-LVL III: ICD-10-PCS | Mod: PBBFAC,,, | Performed by: INTERNAL MEDICINE

## 2019-03-08 PROCEDURE — 99999 PR PBB SHADOW E&M-EST. PATIENT-LVL III: CPT | Mod: PBBFAC,,, | Performed by: INTERNAL MEDICINE

## 2019-03-08 NOTE — PROGRESS NOTES
CC I had my scans done     Raymundo Richard is a 68 y.o.  This is a 68-year-old gentleman who has a history of renal cell carcinoma for which he underwent a right radical   nephrectomy in 2013.  He had been followed by his oncologist, Dr. Almeida, and   recent CT scan of the chest and abdomen ordered by his urologist did reveal evidence of pulmonary   nodules for which he underwent a lung biopsy by Dr. Diop on 08/28/2018, and   the pathology report revealed it to be consistent with metastatic renal cell   carcinoma.  Clear cell.   Pt had presented to ER with hemoptysis as well   Patient also has a history of a high PSA with the last PSA being 14  on July 24, 2018.    Prostate biopsies in 2012 which showed no malignancy and for now Urology will proceed with observation alone       He has been to [facility] and was placed on pazopanib. AFter taking this medicine for 8 days he developed severe gastritis and went to the eR . He was instructed to stop this medicine and see me   He then started sorafenib and is here to assess his tresponse to such. Latest CT March 4 2019 reveals he has increasing size of all the pulmonary mets from the renal cell carcinoma, a new pancreatic mass, diffuse bone mets and new left renal masses  CT from March 4 2019 reveals progression of pulmonary metastases with increase in size, new pancreatic mass 3.3 cm in the tail and new development of osseous metastatic disease        Past Medical History:   Diagnosis Date    GERD (gastroesophageal reflux disease)     Renal cell carcinoma      Past Surgical History:   Procedure Laterality Date    APPENDECTOMY      BIOPSY, PROSTATE N/A 11/15/2012    Performed by Ne Green MD at [facility] OR    HERNIA REPAIR      NEPHRECTOMY         Current Outpatient Medications:     b complex vitamins tablet, Take by mouth., Disp: , Rfl:     betamethasone dipropionate (DIPROLENE) 0.05 % cream, APPLY TO HANDS TWICE A DAY AS NEEDED, Disp: , Rfl: 0     "finasteride (PROSCAR) 5 mg tablet, Take 1 tablet (5 mg total) by mouth once daily., Disp: 30 tablet, Rfl: 11    HYDROcodone-acetaminophen (NORCO) 7.5-325 mg per tablet, Take 1 tablet by mouth every 6 (six) hours as needed for Pain., Disp: 90 tablet, Rfl: 0    pantoprazole (PROTONIX) 40 MG tablet, Take 1 tablet (40 mg total) by mouth once daily., Disp: 90 tablet, Rfl: 3    saw palmetto frt/phytosterol 2 (PROSTATE SR) 160-250 mg Cap, Take 1 capsule by mouth 2 (two) times daily., Disp: , Rfl:     sildenafil (REVATIO) 20 mg Tab, Take 1 tablet (20 mg total) by mouth as directed., Disp: 30 tablet, Rfl: 6    SORAfenib (NEXAVAR) 200 mg tablet, Take 2 tablets (400 mg total) by mouth 2 (two) times daily., Disp: 120 tablet, Rfl: 2    tamsulosin (FLOMAX) 0.4 mg Cap, TAKE ONE CAPSULE BY MOUTH ONCE DAILY, Disp: 90 capsule, Rfl: 1    No family history on file.    CONSTITUTIONAL: No fevers, chills, night sweats, wt. loss, appetite changes  SKIN: no rashes or itching  ENT: No headaches, head trauma, vision changes, no further rhinorrhea  LYMPH NODES: None noticed   CV: No chest pain, palpitations.   RESP: No dyspnea on exertion, cough, hemoptysis  GI: No nausea, emesis, diarrhea, constipation, melena, pain.   : No dysuria, hematuria, urgency,no discharge or incontinence  HEME: No easy bruising, bleeding problems  PSYCHIATRIC: No depression, anxiety, psychosis, hallucinations.  NEURO: No seizures, memory loss, difficulty sleeping  MSK: No arthralgias or joint swelling         /77   Pulse 92   Temp 99.2 °F (37.3 °C)   Resp (!) 22   Ht 5' 5" (1.651 m)   Wt 68.6 kg (151 lb 3.8 oz)   SpO2 97%   BMI 25.17 kg/m²     Constitutional: oriented to person, place, and time.  well-developed and well-nourished.   HENT:   Head: Normocephalic and atraumatic.   Right Ear: External ear normal.   Left Ear: External ear normal.   Nose: Nose normal.   Mouth/Throat: Oropharynx is clear and moist.   Eyes: Conjunctivae and EOM are " normal. Pupils are equal, round conj pink   Neck: Normal range of motion. Neck supple. No thyromegaly present.   Cardiovascular: Normal rate, regular rhythm, normal heart sounds   No murmur heard.  Pulmonary/Chest: Effort normal and breath sounds normal.  no wheezes.  no rales.   Abdominal: Soft. Bowel sounds are normal.  no mass. There is no tenderness.no obesity, no distention  Musculoskeletal: Normal range of motion.  no edema   Lymphadenopathy:    no cervical adenopathy.   Neurological: alert and oriented to person, place, and time.   Skin: Skin is warm and dry. No rash noted.   Vitals reviewed.     Ref Range & Units 1mo ago   PSA DIAGNOSTIC 0.00 - 4.00 ng/mL 14.0 Abnormally high     [facility] UNKNOWN RAD [facility]   Order: [id number]   Status:  Final result   Visible to patient:  No (Not Released) Next appt:  Today at 01:00 PM in Hematology and Oncology (Nikia Anna MD)   Details     Reading Physician Reading Date Result Priority   Lamonte Pineda MD 6/22/2018       Narrative     Reason: Dizziness and giddiness M 54.5, R 42    TECHNIQUE: MRI brain without IV contrast    COMPARISON: Diffusion-weighted imaging is normal.    Isolated punctate focus of T2 hyperintensity affects the posterolateral left  frontal lobe subcortical white matter, nonspecific,    FINDINGS:  Diffusion-weighted imaging is normal.    Isolated punctate focus of T2 hyperintensity affect the posterolateral left  frontal lobe subcortical white matter, nonspecific, likely indicating gliosis.  Gray and white matter are otherwise normal. No intracranial mass effect or  midline shift. Ventricles and cisterns are maintained.    Intracranial flow voids are normal. Calvarial bone marrow signal is normal.  Mild left maxillary sinus also thickening present.    IMPRESSION:  1. Essentially negative noncontrast MRI brain.  2. Isolated punctate focus of T2 hyperintensity in the left frontal lobe  subcortical white matter suggesting gliosis, and most likely related to  chronic  small vessel ischemic change.  3. Mild left maxillary sinus disease.    Read and electronically signed by: Eagle Pineda MD on 6/22/2018 2:49 PM CDT      EAGLE PINEDA MD            Specimen Collected: 06/22/18 13:52 Last Resulted: 06/22/18 14:51 Order Details View Encounter Lab and Collection Details Routing Result History            External Result Report     External Result Report   Narrative     Reason: Dizziness and giddiness M 54.5, R 42    TECHNIQUE: MRI brain without IV contrast    COMPARISON: Diffusion-weighted imaging is normal.    Isolated punctate focus of T2 hyperintensity affects the posterolateral left  frontal lobe subcortical white matter, nonspecific,    FINDINGS:  Diffusion-weighted imaging is normal.    Isolated punctate focus of T2 hyperintensity affect the posterolateral left  frontal lobe subcortical white matter, nonspecific, likely indicating gliosis.  Gray and white matter are otherwise normal. No intracranial mass effect or  midline shift. Ventricles and cisterns are maintained.    Intracranial flow voids are normal. Calvarial bone marrow signal is normal.  Mild left maxillary sinus also thickening present.    IMPRESSION:  1. Essentially negative noncontrast MRI brain.  2. Isolated punctate focus of T2 hyperintensity in the left frontal lobe  subcortical white matter suggesting gliosis, and most likely related to chronic  small vessel ischemic change.  3. Mild left maxillary sinus disease.    Read and electronically signed by: Eagle Pineda MD on 6/22/2018 2:49 PM CDT      EAGLE PINEDA MD    Encounter     View Encounter          Signed by           PACS Images      Show images for CT Abdomen Pelvis Without Contrast   Reviewed By List     CT Abdomen Pelvis  Without Contrast   Order: [id number]   Status:  Final result   Visible to patient:  No (Not Released) Next appt:  Today at 01:00 PM in Hematology and Oncology (Nikia Anna MD) Dx:  Renal cell carcinoma of right kidney    Details     Reading Physician Reading Date Result Priority   Abril Greco MD 4/13/2018       Narrative     EXAMINATION:  CT ABDOMEN PELVIS WITHOUT CONTRAST    CLINICAL HISTORY:  Renal cell cancer, staging; Malignant neoplasm of right kidney, except renal pelvis    TECHNIQUE:  Low dose axial images, sagittal and coronal reformations were obtained from the lung bases to the pubic symphysis.  .    COMPARISON:  7/18/2017    FINDINGS:  Visualized lung bases show few scattered nodules.  Largest in the right lung base measures 9 mm.  In the left lung base is difficult to distinguish from adjacent vessels but measures approximately 12 mm and appears mildly increased in size compared to the prior chest CT of 7/18/2017.  The right lung nodule is in an area not included on the earlier abdominal CT of 6/268/2016.  Prior abdominal CT 6/28/2016 did not include the area of the right lung nodule.  The area of the left lung nodule is thought to be present on the cephalad most image and it also is increased in size compared to that exam.    There is calcified no left cardiophrenic angle.  There are no radiopaque stones in the gallbladder or CT findings of acute cholecystitis.  Patient is post right nephrectomy.  There is 1 mm and 3 mm stone in the left kidney.  The stone burden in the left kidney appears increased compared to the prior exam.  There is no hydronephrosis opaque left ureteral stone and left ureteral obstruction.  There is mild left perinephric stranding appearing chronic.  Abdominal aorta is non aneurysmal.  Spleen, liver, pancreas, adrenal glands as visualized on the noncontrast study are unremarkable in appearance.  The appendix is not identified but no abnormal appendix inflammatory changes appendiceal region is seen.  The prostate gland is enlarged measuring approximately 5 0.4 cm transversely, contains calcifications, deforms the adjacent urinary bladder.  There is mild diffuse bladder wall  thickening.    There is diverticulosis without CT findings of acute diverticulitis.    There is no free intraperitoneal air or fluid.  There is no significant adenopathy    There are degenerative changes in osseous structures and sclerosis and ankylosis across sacroiliac joints.  No findings seen to indicate osseous metastatic disease..      Impression       Right nephrectomy with no findings suggesting metastatic disease or recurrent disease within the abdomen or pelvis.  Pulmonary nodules appearing and mildly increased in size compared to prior exam.  Suggest CT chest.    Additional findings as detailed above including a large prostate gland, diffuse bladder wall thickening, diverticulosis without CT findings of acute diverticulitis, nonobstructing left renal stones.      Electronically signed by: Abril Greco MD  Date: 04/13/2018  Time: 11:04           Hospital follow up   Lab Results   Component Value Date    WBC 6.20 01/23/2019    HGB 14.7 01/23/2019    HCT 45.0 01/23/2019    MCV 89 01/23/2019     (H) 01/23/2019     CMP  Sodium   Date Value Ref Range Status   01/23/2019 140 136 - 145 mmol/L Final     Potassium   Date Value Ref Range Status   01/23/2019 4.8 3.5 - 5.1 mmol/L Final     Chloride   Date Value Ref Range Status   01/23/2019 105 95 - 110 mmol/L Final     CO2   Date Value Ref Range Status   01/23/2019 27 23 - 29 mmol/L Final     Glucose   Date Value Ref Range Status   01/23/2019 93 70 - 110 mg/dL Final     BUN, Bld   Date Value Ref Range Status   01/23/2019 20 8 - 23 mg/dL Final     Creatinine   Date Value Ref Range Status   03/04/2019 1.3 0.5 - 1.4 mg/dL Final   11/14/2012 0.9 0.5 - 1.4 mg/dL Final     Calcium   Date Value Ref Range Status   01/23/2019 10.4 8.7 - 10.5 mg/dL Final   11/14/2012 9.3 8.7 - 10.5 mg/dL Final     Total Protein   Date Value Ref Range Status   01/23/2019 8.1 6.0 - 8.4 g/dL Final     Albumin   Date Value Ref Range Status   01/23/2019 3.9 3.5 - 5.2 g/dL Final    10/03/2012 4.1 3.6 - 5.1 g/dL Final     Comment:     Albumin:  THIS TEST WAS PERFORMED AT:  [facility]  [id number] [place] [id number]  NIKKY Strickland MD, PhD     Total Bilirubin   Date Value Ref Range Status   01/23/2019 0.5 0.1 - 1.0 mg/dL Final     Comment:     For infants and newborns, interpretation of results should be based  on gestational age, weight and in agreement with clinical  observations.  Premature Infant recommended reference ranges:  Up to 24 hours.............<8.0 mg/dL  Up to 48 hours............<12.0 mg/dL  3-5 days..................<15.0 mg/dL  6-29 days.................<15.0 mg/dL       Alkaline Phosphatase   Date Value Ref Range Status   01/23/2019 90 55 - 135 U/L Final   11/14/2012 57 55 - 135 U/L Final     AST   Date Value Ref Range Status   01/23/2019 21 10 - 40 U/L Final   11/14/2012 28 10 - 40 U/L Final     ALT   Date Value Ref Range Status   01/23/2019 17 10 - 44 U/L Final     Anion Gap   Date Value Ref Range Status   01/23/2019 8 8 - 16 mmol/L Final   11/14/2012 11 5 - 15 meq/L Final     eGFR if    Date Value Ref Range Status   03/04/2019 >60 >60 mL/min/1.73 m^2 Final     eGFR if non    Date Value Ref Range Status   03/04/2019 56 (A) >60 mL/min/1.73 m^2 Final     Comment:     Calculation used to obtain the estimated glomerular filtration  rate (eGFR) is the CKD-EPI equation.        SPECIMEN  1) left Lung Bx  FINAL PATHOLOGIC DIAGNOSIS  LEFT LUNG, BIOPSY:  Consistent with metastatic renal cell carcinoma  Patient has a past clinical history of renal cell carcinoma.  Immunostains EMA, RCC AND CD68 have been performed (with appropriate controls) and the results support the  above diagnosis.      Contains abnormal data CT Chest Abdoment Pelvis With Contrast   Order: [id number]   Status:  Final result   Visible to patient:  Yes (Patient Portal) Next appt:  Today at 08:40 AM in Hematology and Oncology Nicole GIMENEZ  MD Wilfrido) Dx:  Renal cell carcinoma, unspecified lat...   Details     Reading Physician Reading Date Result Priority   Abril Greco MD 3/4/2019       Narrative     EXAMINATION:  CT CHEST ABDOMEN PELVIS WITH CONTRAST (XPD)    CLINICAL HISTORY:  metastatic renal cell cancer; Malignant neoplasm of unspecified kidney, except renal pelvis    TECHNIQUE:  Low dose axial images, sagittal and coronal reformations were obtained from the thoracic inlet to the pubic symphysis following the IV administration of 75 mL of Omnipaque 350 and with 30 mL Omnipaque 350 p.o.    COMPARISON:  4/13/2018 abdomen and pelvis, 4/19/218 chest    FINDINGS:  Chest; the visualized base of the neck are unremarkable in appearance.    There by prominent mediastinal nodes.  Short axis diameter of node to the left of the trachea is m. The appearance is not significantly changed.  Some of the nodes are calcified and there are calcified granulomas consistent with old granulomatous disease    There are vascular calcifications including coronary artery calcifications are no pleural or pericardial effusion    Lung; right lung multiple nodules the largest measuring 11 mm today in contrast to 9 mm on the prior exam.    Left lung small nodules.  Left perihilar nodule previously measured at 18 mm is more difficult to measure on today's study with new peripheral discoid atelectasis that extends to the left lateral chest wall.  It is suggested to be slightly larger today.  The left lower lobe nodule appears slightly larger today.  On comparable images measured 1.4 cm previously versus 1.6 cm today.  This corresponds to nodule that was biopsied.  There is also 8 mm nodule laterally left lung base axial series 4, image 350 today not seen previously and another nodule in the left lateral lung base axial series 4, image 368 measuring 6 mm is slightly larger today.  Another 5 mm nodule left lung base axial series 4, image 359 appears new    Abdomen and  pelvis;.    Liver; no masses    Gallbladder and bile ducts; no calcified stones or CT findings of acute and spleen: Not enlarged    Adrenal glands; unremarkable appearance    Pancreas; new lesion in the tail of the pancreas for example axial series 2 in 0 5 min approximately 3.3 cm with small central cystic or necrotic appearing area.  No surrounding peripancreatic fat and or edema or adenopathy although could be further assessed with CT or MRI pancreas protocol..    Abdominal aorta; resume    Kidneys, ureters, bladder; right nephrectomy.  1 mm and 3 mm stones in the left kidney multiple left renal masses there are just slightly hypodense to the left kidney suggesting multiple cysts for example lower pole axial series 2, image 136 coronal series 602, image 99 measuring 1.3 cm, axial series 2, image 114 coronal series 602, image 106 measuring 10 mm, axial series 2, image 129 coronal series 602, image 91 measuring 9 mm    Urinary bladder unremarkable in appearance.    Prostate gland enlarged containing calcifications    Stomach,, Bowel, mesentery; diverticulosis without CT findings of acute diverticulitis.  No free intraperitoneal air or fluid.  Appendix not identified with certainty but no abnormal appendix is seen    Significant adenopathy is not seen.  The small shotty appearing pericaval/Nora aortic nodes.    Osseous structures; degenerative changes with findings suggesting osseous metastatic disease.      Impression       Mild increase in the size and number the masses within the lungs consistent with metastatic disease    Multiple solid-appearing left renal masses although not seen on the prior exam prior study was without IV contrast.  These however not seen earlier chest CT of 1/23/2017.  Differential includes infectious/inflammatory process such as lobar nephronia, metastatic disease to the kidney, primary renal cell carcinomas    Also interval development of necrotic appearing pancreatic mass suspicious for  pancreatic cancer    This report was flagged in Epic as abnormal.      Electronically signed by: Abril Greco MD  Date: 03/04/2019                         Chemotherapy follow-up examination    Malignant neoplasm metastatic to lung, unspecified laterality    Renal cell carcinoma, unspecified laterality    Oncology follow-up encounter    Opioid use    Benign prostatic hyperplasia, unspecified whether lower urinary tract symptoms present    Osseous metastasis    Bilateral pulmonary metastases    Pancreatic mass      Progression on sorafenib  Pt will consider his options   He wants to proceed with obs     stage IV renal cell carcinoma. Lung mets   New pancreatic mass, new diffuse bone lesions and new left renal masses  Pt does not want a biopsy to decipher if this is pancreatic cancer   Discussed prognosis     Pt asymptomatic   Spent ten minutes on end of life counseling and what to expect without treatment  Stop sorafenib  Hospice discussed   Cont finasteride for BPH   Cont flomax for urinary frequency     Advance Care Planning     Living Will  During this visit, I engaged the patient in the advance care planning process.  The patient and I reviewed the role for advance directives and their purpose in directing future healthcare if the patient's unable to speak for him/herself.  At this point in time, the patient does have full decision-making capacity.  We discussed different extreme health states that he could experience, and reviewed what kind of medical care he would want in those situations.  The patient communicated that if he were comatose and had little chance of a meaningful recovery, he would not want machines/life-sustaining treatments used.   The patient has  completed a living will to reflect these preferences.  The patient  HAS  already designated a healthcare power of  to make decisions on his behalf.   I spent a total of 10 minutes engaging the patient in this advance care planning  discussion.               Thank you for allowing me to evaluate and participate in the care of this pleasant patient. Please fell free to call me with any questions or concerns.    [place]armly,  Nikia Anna MD    This note was dictated with Bernardinoon and briefly proofread. Please excuse any sentences that may be unclear or words misspelled

## 2019-04-03 RX ORDER — FINASTERIDE 5 MG/1
TABLET, FILM COATED ORAL
Qty: 30 TABLET | Refills: 11 | Status: SHIPPED | OUTPATIENT
Start: 2019-04-03 | End: 2020-01-01 | Stop reason: CLARIF

## 2019-04-04 DIAGNOSIS — G89.29 CHRONIC BACK PAIN, UNSPECIFIED BACK LOCATION, UNSPECIFIED BACK PAIN LATERALITY: ICD-10-CM

## 2019-04-04 DIAGNOSIS — M54.9 CHRONIC BACK PAIN, UNSPECIFIED BACK LOCATION, UNSPECIFIED BACK PAIN LATERALITY: ICD-10-CM

## 2019-04-04 RX ORDER — HYDROCODONE BITARTRATE AND ACETAMINOPHEN 7.5; 325 MG/1; MG/1
1 TABLET ORAL EVERY 6 HOURS PRN
Qty: 90 TABLET | Refills: 0 | Status: SHIPPED | OUTPATIENT
Start: 2019-04-04 | End: 2019-05-06 | Stop reason: SDUPTHER

## 2019-04-04 NOTE — TELEPHONE ENCOUNTER
----- Message from Gina Salomon sent at 4/4/2019  8:01 AM CDT -----  Contact: self  Pt is due for refill of hydrocodone.

## 2019-05-06 DIAGNOSIS — M54.9 CHRONIC BACK PAIN, UNSPECIFIED BACK LOCATION, UNSPECIFIED BACK PAIN LATERALITY: ICD-10-CM

## 2019-05-06 DIAGNOSIS — G89.29 CHRONIC BACK PAIN, UNSPECIFIED BACK LOCATION, UNSPECIFIED BACK PAIN LATERALITY: ICD-10-CM

## 2019-05-06 RX ORDER — HYDROCODONE BITARTRATE AND ACETAMINOPHEN 7.5; 325 MG/1; MG/1
1 TABLET ORAL EVERY 6 HOURS PRN
Qty: 90 TABLET | Refills: 0 | Status: SHIPPED | OUTPATIENT
Start: 2019-05-06 | End: 2019-06-05 | Stop reason: SDUPTHER

## 2019-05-06 NOTE — TELEPHONE ENCOUNTER
----- Message from Gina Salomon sent at 5/6/2019 11:46 AM CDT -----  Contact: self  Pt called on Thursday, due for hydrocodone refill.

## 2019-05-14 ENCOUNTER — TELEPHONE (OUTPATIENT)
Dept: UROLOGY | Facility: CLINIC | Age: 69
End: 2019-05-14

## 2019-05-14 NOTE — TELEPHONE ENCOUNTER
Spoke with patient, wanted to be seen soon, offered appt with NP, patient refused, soonest appt made with the MD, patient verbally understood

## 2019-05-14 NOTE — TELEPHONE ENCOUNTER
----- Message from Ayaka Mancera sent at 5/14/2019 11:27 AM CDT -----  Contact: Raymundo  Type:  Patient Returning Call    Who Called:  Patient   Who Left Message for Patient:  Aundrea   Does the patient know what this is regarding?:  Appointment   Best Call Back Number:  [phone]  Additional Information:  na

## 2019-05-14 NOTE — TELEPHONE ENCOUNTER
----- Message from Myra Rose sent at 5/14/2019 10:15 AM CDT -----  Contact: Patient  Type:  Sooner Apoointment Request    Caller is requesting a sooner appointment.  Caller declined first available appointment listed below.  Caller will not accept being placed on the waitlist and is requesting a message be sent to doctor.    Name of Caller:  Patient  When is the first available appointment?  6/25/19  Symptoms:  psa and to discuss a matter w/ the Doctor  Best Call Back Number:   Additional Information:  I offered to schedule his psa but he declined until he sees the Doctor. Please advise.

## 2019-05-20 PROBLEM — Z09 CHEMOTHERAPY FOLLOW-UP EXAMINATION: Status: RESOLVED | Noted: 2019-02-18 | Resolved: 2019-05-20

## 2019-05-20 PROBLEM — Z09 ONCOLOGY FOLLOW-UP ENCOUNTER: Status: RESOLVED | Noted: 2019-02-18 | Resolved: 2019-05-20

## 2019-06-03 DIAGNOSIS — K21.9 GASTROESOPHAGEAL REFLUX DISEASE, ESOPHAGITIS PRESENCE NOT SPECIFIED: ICD-10-CM

## 2019-06-03 RX ORDER — PANTOPRAZOLE SODIUM 40 MG/1
40 TABLET, DELAYED RELEASE ORAL DAILY
Qty: 90 TABLET | Refills: 3 | Status: SHIPPED | OUTPATIENT
Start: 2019-06-03

## 2019-06-05 DIAGNOSIS — M54.9 CHRONIC BACK PAIN, UNSPECIFIED BACK LOCATION, UNSPECIFIED BACK PAIN LATERALITY: ICD-10-CM

## 2019-06-05 DIAGNOSIS — G89.29 CHRONIC BACK PAIN, UNSPECIFIED BACK LOCATION, UNSPECIFIED BACK PAIN LATERALITY: ICD-10-CM

## 2019-06-05 RX ORDER — HYDROCODONE BITARTRATE AND ACETAMINOPHEN 7.5; 325 MG/1; MG/1
1 TABLET ORAL EVERY 6 HOURS PRN
Qty: 90 TABLET | Refills: 0 | Status: SHIPPED | OUTPATIENT
Start: 2019-06-05 | End: 2019-01-01 | Stop reason: SDUPTHER

## 2019-06-05 NOTE — TELEPHONE ENCOUNTER
----- Message from Gina Salomon sent at 6/5/2019  1:42 PM CDT -----  Contact: self  Pt is due for refill of hydrocodone.

## 2019-07-03 NOTE — TELEPHONE ENCOUNTER
----- Message from Gina Salomon sent at 7/3/2019  1:13 PM CDT -----  Contact: self  Is due on Friday, 7/5/19, for refill of hydrocodone.  Please call him when its ready.

## 2019-08-07 NOTE — TELEPHONE ENCOUNTER
----- Message from Gina Salomon sent at 8/6/2019  5:23 PM CDT -----  Contact: self  Is due for refill of hydrocodone.  Please call him when it is ready.

## 2019-09-05 NOTE — TELEPHONE ENCOUNTER
----- Message from Gina Salomon sent at 9/5/2019  5:14 PM CDT -----  Contact: self  Pt is due for refill of hydrocodone.

## 2019-10-31 NOTE — TELEPHONE ENCOUNTER
----- Message from Gina Salomon sent at 10/31/2019  5:28 PM CDT -----  Contact: SELF  DUE FOR REFILL OF HYDROCODONE.

## 2019-11-05 NOTE — TELEPHONE ENCOUNTER
----- Message from Nickolas Peters sent at 11/5/2019 11:57 AM CST -----  Type:  RX Refill Request    Who Called:  Self   Refill or New Rx:  Refill   RX Name and Strength:  Trimix   How is the patient currently taking it? (ex. 1XDay):    Is this a 30 day or 90 day RX:  30 day supply  Preferred Pharmacy with phone number:  [facility] [id number]  Local or Mail Order:  Local   Ordering Provider: Dr Green   [facility] Call Back Number:  [phone]  Additional Information:

## 2019-11-05 NOTE — TELEPHONE ENCOUNTER
Patient needed an appointment prior to having rx refilled. Soonest was with Татьяна Holly NP on 11/11/19. Pt agreed to appt and verbalized understanding.

## 2019-11-11 NOTE — PROGRESS NOTES
[facility] Note  Staff: SANDRA Mehta    PCP: Dr. Ryan Rose    Chief Complaint: Erectile Dysfunction, Renal Cell Carcinoma with mets.    Subjective:        HPI: Raymundo Richard is a 69 y.o. male presents today for follow-up/med refill.    Pt was last seen by Dr. Green on 9/11/2018 for history of the following:  Renal cell carcinoma status post right radical nephrectomy,   solitary left kidney, BPH, elevated PSA, erectile dysfunction, and pulmonary   Nodules.    PT currently takes the following  meds with no problems at this time and requesting refill of Trimix during today's office visit:    Flomax 0.4 mg one tablet daily.  Finasteride 5 mg daily  Trimix injections for ED    Updated pt's Medical Status at this time  Per Dr. Anna's note on 3/8/19:  This is a 69-year-old gentleman who has a history of renal cell carcinoma for which he underwent a right radical   nephrectomy in 2013.  He had been followed by his oncologist, Dr. Almeida, and   recent CT scan of the chest and abdomen ordered by his urologist did reveal evidence of pulmonary   nodules for which he underwent a lung biopsy by Dr. Diop on 08/28/2018, and   the pathology report revealed it to be consistent with metastatic renal cell   carcinoma.  Clear cell.   Pt had presented to ER with hemoptysis as well   Patient also has a history of a high PSA with the last PSA being 14  on July 24, 2018.    Prostate biopsies in 2012 which showed no malignancy and for now Urology will proceed with observation alone        He has been to [facility] and was placed on pazopanib. AFter taking this medicine for 8 days he developed severe gastritis and went to the eR . He was instructed to stop this medicine and see me   He then started sorafenib and is here to assess his tresponse to such. Latest CT March 4 2019 reveals he has increasing size of all the pulmonary mets from the renal cell carcinoma, a new pancreatic mass, diffuse bone  mets and new left renal masses  CT from March 4 2019 reveals progression of pulmonary metastases with increase in size, new pancreatic mass 3.3 cm in the tail and new development of osseous metastatic disease    Last PSA Screening:   Lab Results   Component Value Date    PSA 6.04 (H) 10/03/2012    PSADIAG 14.0 (H) 07/24/2018    PSADIAG 15.6 (H) 04/19/2018    PSADIAG 7.7 (H) 11/27/2017     REVIEW OF SYSTEMS:  A comprehensive 10 system review was performed and is negative except as noted above in HPI    PMHx:  Past Medical History:   Diagnosis Date    GERD (gastroesophageal reflux disease)     Renal cell carcinoma      PSHx:  Past Surgical History:   Procedure Laterality Date    APPENDECTOMY      HERNIA REPAIR      NEPHRECTOMY       Allergies:  Other    Medications: reviewed   Objective:     Vitals:    11/11/19 1313   BP: 137/82   Pulse: 88   Resp: 18   Temp: 98 °F (36.7 °C)     General:WDWN in NAD  Eyes: PERRLA, normal conjunctiva  Respiratory: no increased work on breathing, clear to auscultation  Cardiovascular: regular rate and rhythm. No obvious extremity edema.  GI: palpation of masses. No tenderness. No hepatosplenomegaly to palpation.  Musculoskeletal: normal range of motion of bilateral upper extremities. Normal muscle strength and tone.  Skin: no obvious rashes or lesions. No tightening of skin noted.  Neurologic: CN grossly normal. Normal sensation.   Psychiatric: awake, alert and oriented x 3. Mood and affect normal. Cooperative.    :  Deferred    LABS REVIEW:  UA today:  Color:Clear, Yellow  Spec. Grav.  1.025  PH  5.5  Moderate blood in urine  Assessment:       1. Erectile dysfunction, unspecified erectile dysfunction type    2. Renal cell carcinoma, unspecified laterality          Plan:   ED, Renal cell carcinoma with metastasis    Continue Flomax and Finasteride as previously prescribed.  Trimix injections refilled for pt today for the following dosing:  Tri-Mix 30-1-10, Qty-10 mL with 3  refills.  (This has been verified by me during ov today by pharmacist at [facility]-it also was called in and faxed to [facility])    F/u prn    [facility]: Danielle Holly, LUIS CARLOS-C

## 2019-12-04 NOTE — TELEPHONE ENCOUNTER
Med is no longer on med list. Please order medication again. Not sure why it was removed, but I dont see it anywhere to pend proper dose.

## 2019-12-04 NOTE — TELEPHONE ENCOUNTER
----- Message from Gnia Salomon sent at 12/3/2019  4:45 PM CST -----  Contact: self  Patient is due for refill of hydrocodone.  Please let him know when he can pick it up.

## 2020-01-01 ENCOUNTER — TELEPHONE (OUTPATIENT)
Dept: HEMATOLOGY/ONCOLOGY | Facility: CLINIC | Age: 70
End: 2020-01-01

## 2020-01-01 ENCOUNTER — HOSPITAL ENCOUNTER (OUTPATIENT)
Dept: RADIOLOGY | Facility: HOSPITAL | Age: 70
Discharge: HOME | DRG: 948 | End: 2020-01-06
Attending: FAMILY MEDICINE
Payer: MEDICARE

## 2020-01-01 ENCOUNTER — TELEPHONE (OUTPATIENT)
Dept: NEUROSURGERY | Facility: CLINIC | Age: 70
End: 2020-01-01

## 2020-01-01 ENCOUNTER — CLINICAL SUPPORT (OUTPATIENT)
Dept: REHABILITATION | Facility: HOSPITAL | Age: 70
End: 2020-01-01
Attending: PHYSICAL MEDICINE & REHABILITATION
Payer: MEDICARE

## 2020-01-01 ENCOUNTER — HOSPITAL ENCOUNTER (EMERGENCY)
Facility: HOSPITAL | Age: 70
Discharge: HOME | End: 2020-01-01
Attending: EMERGENCY MEDICINE
Payer: MEDICARE

## 2020-01-01 ENCOUNTER — HOSPITAL ENCOUNTER (INPATIENT)
Facility: HOSPITAL | Age: 70
LOS: 12 days | Discharge: NURSING, REHAB OR LONG-TERM CARE | DRG: 029 | End: 2020-01-20
Attending: NEUROLOGICAL SURGERY | Admitting: NEUROLOGICAL SURGERY
Payer: MEDICARE

## 2020-01-01 ENCOUNTER — ANESTHESIA (OUTPATIENT)
Dept: NEUROSURGERY | Facility: HOSPITAL | Age: 70
DRG: 029 | End: 2020-01-01
Payer: MEDICARE

## 2020-01-01 ENCOUNTER — ANESTHESIA (OUTPATIENT)
Dept: SURGERY | Facility: HOSPITAL | Age: 70
DRG: 029 | End: 2020-01-01
Payer: MEDICARE

## 2020-01-01 ENCOUNTER — ANESTHESIA EVENT (OUTPATIENT)
Dept: NEUROSURGERY | Facility: HOSPITAL | Age: 70
DRG: 029 | End: 2020-01-01
Payer: MEDICARE

## 2020-01-01 ENCOUNTER — OFFICE VISIT (OUTPATIENT)
Dept: SPINE | Facility: CLINIC | Age: 70
End: 2020-01-01
Payer: MEDICARE

## 2020-01-01 ENCOUNTER — HOSPITAL ENCOUNTER (INPATIENT)
Facility: HOSPITAL | Age: 70
LOS: 1 days | Discharge: OTHER INSTITUTION | DRG: 948 | End: 2020-01-08
Attending: INTERNAL MEDICINE | Admitting: INTERNAL MEDICINE
Payer: MEDICARE

## 2020-01-01 ENCOUNTER — ANESTHESIA EVENT (OUTPATIENT)
Dept: SURGERY | Facility: HOSPITAL | Age: 70
DRG: 029 | End: 2020-01-01
Payer: MEDICARE

## 2020-01-01 VITALS
RESPIRATION RATE: 18 BRPM | DIASTOLIC BLOOD PRESSURE: 78 MMHG | OXYGEN SATURATION: 94 % | BODY MASS INDEX: 24.98 KG/M2 | HEIGHT: 65 IN | TEMPERATURE: 98 F | WEIGHT: 149.94 LBS | SYSTOLIC BLOOD PRESSURE: 150 MMHG | HEART RATE: 87 BPM

## 2020-01-01 VITALS
HEIGHT: 65 IN | RESPIRATION RATE: 18 BRPM | BODY MASS INDEX: 25.01 KG/M2 | HEART RATE: 70 BPM | WEIGHT: 150.13 LBS | TEMPERATURE: 97 F | DIASTOLIC BLOOD PRESSURE: 69 MMHG | SYSTOLIC BLOOD PRESSURE: 127 MMHG | OXYGEN SATURATION: 95 %

## 2020-01-01 VITALS
HEIGHT: 65 IN | RESPIRATION RATE: 16 BRPM | SYSTOLIC BLOOD PRESSURE: 155 MMHG | WEIGHT: 150 LBS | BODY MASS INDEX: 24.99 KG/M2 | HEART RATE: 77 BPM | OXYGEN SATURATION: 98 % | DIASTOLIC BLOOD PRESSURE: 67 MMHG | TEMPERATURE: 98 F

## 2020-01-01 VITALS
WEIGHT: 149.69 LBS | BODY MASS INDEX: 24.91 KG/M2 | SYSTOLIC BLOOD PRESSURE: 120 MMHG | HEART RATE: 92 BPM | DIASTOLIC BLOOD PRESSURE: 60 MMHG

## 2020-01-01 DIAGNOSIS — M54.50 LUMBAGO: ICD-10-CM

## 2020-01-01 DIAGNOSIS — M54.9 BACK PAIN: ICD-10-CM

## 2020-01-01 DIAGNOSIS — R26.9 GAIT ABNORMALITY: ICD-10-CM

## 2020-01-01 DIAGNOSIS — M54.9 BACK PAIN, UNSPECIFIED BACK LOCATION, UNSPECIFIED BACK PAIN LATERALITY, UNSPECIFIED CHRONICITY: ICD-10-CM

## 2020-01-01 DIAGNOSIS — Z78.9 IMPAIRED MOBILITY AND ADLS: Primary | ICD-10-CM

## 2020-01-01 DIAGNOSIS — I10 HYPERTENSION, UNSPECIFIED TYPE: ICD-10-CM

## 2020-01-01 DIAGNOSIS — R52 PAIN: ICD-10-CM

## 2020-01-01 DIAGNOSIS — C64.9 RENAL CELL CARCINOMA, UNSPECIFIED LATERALITY: ICD-10-CM

## 2020-01-01 DIAGNOSIS — G95.89 INTRADURAL MASS: ICD-10-CM

## 2020-01-01 DIAGNOSIS — Z71.89 ADVANCED CARE PLANNING/COUNSELING DISCUSSION: ICD-10-CM

## 2020-01-01 DIAGNOSIS — Z78.9 IMPAIRED INSTRUMENTAL ACTIVITIES OF DAILY LIVING (IADL): ICD-10-CM

## 2020-01-01 DIAGNOSIS — Z74.09 IMPAIRED MOBILITY AND ADLS: Primary | ICD-10-CM

## 2020-01-01 DIAGNOSIS — G89.29 CHRONIC MIDLINE LOW BACK PAIN WITH SCIATICA, SCIATICA LATERALITY UNSPECIFIED: ICD-10-CM

## 2020-01-01 DIAGNOSIS — G89.29 CHRONIC MIDLINE LOW BACK PAIN, UNSPECIFIED WHETHER SCIATICA PRESENT: Primary | ICD-10-CM

## 2020-01-01 DIAGNOSIS — M25.619 DECREASED RANGE OF MOTION OF SHOULDER, UNSPECIFIED LATERALITY: ICD-10-CM

## 2020-01-01 DIAGNOSIS — C64.1 RENAL CELL CARCINOMA OF RIGHT KIDNEY METASTATIC TO OTHER SITE: ICD-10-CM

## 2020-01-01 DIAGNOSIS — Z51.5 PALLIATIVE CARE ENCOUNTER: ICD-10-CM

## 2020-01-01 DIAGNOSIS — M54.40 CHRONIC MIDLINE LOW BACK PAIN WITH SCIATICA, SCIATICA LATERALITY UNSPECIFIED: ICD-10-CM

## 2020-01-01 DIAGNOSIS — M54.50 CHRONIC MIDLINE LOW BACK PAIN, UNSPECIFIED WHETHER SCIATICA PRESENT: Primary | ICD-10-CM

## 2020-01-01 DIAGNOSIS — Z01.818 PRE-OP TESTING: ICD-10-CM

## 2020-01-01 DIAGNOSIS — R26.9 GAIT ABNORMALITY: Primary | ICD-10-CM

## 2020-01-01 DIAGNOSIS — Z71.89 GOALS OF CARE, COUNSELING/DISCUSSION: ICD-10-CM

## 2020-01-01 DIAGNOSIS — C64.9 METASTATIC RENAL CELL CARCINOMA: ICD-10-CM

## 2020-01-01 DIAGNOSIS — Z01.818 PREOPERATIVE CLEARANCE: ICD-10-CM

## 2020-01-01 DIAGNOSIS — D49.7 INTRADURAL EXTRAMEDULLARY SPINAL TUMOR: Primary | ICD-10-CM

## 2020-01-01 DIAGNOSIS — M54.50 LUMBAGO: Primary | ICD-10-CM

## 2020-01-01 DIAGNOSIS — M54.30 SCIATICA, UNSPECIFIED LATERALITY: ICD-10-CM

## 2020-01-01 DIAGNOSIS — M54.9 BACK PAIN, UNSPECIFIED BACK LOCATION, UNSPECIFIED BACK PAIN LATERALITY, UNSPECIFIED CHRONICITY: Primary | ICD-10-CM

## 2020-01-01 DIAGNOSIS — E83.52 HYPERCALCEMIA OF MALIGNANCY: ICD-10-CM

## 2020-01-01 DIAGNOSIS — M54.50 LOW BACK PAIN, UNSPECIFIED BACK PAIN LATERALITY, UNSPECIFIED CHRONICITY, UNSPECIFIED WHETHER SCIATICA PRESENT: Primary | ICD-10-CM

## 2020-01-01 DIAGNOSIS — G95.89 SPINAL CORD MASS: ICD-10-CM

## 2020-01-01 DIAGNOSIS — M25.611 DECREASED RANGE OF MOTION OF RIGHT SHOULDER: Primary | ICD-10-CM

## 2020-01-01 DIAGNOSIS — M48.061 LUMBAR FORAMINAL STENOSIS: ICD-10-CM

## 2020-01-01 LAB
ABO + RH BLD: NORMAL
ABO + RH BLD: NORMAL
ALBUMIN SERPL BCP-MCNC: 3.4 G/DL (ref 3.5–5.2)
ALLENS TEST: ABNORMAL
ALP SERPL-CCNC: 49 U/L (ref 55–135)
ALT SERPL W/O P-5'-P-CCNC: 13 U/L (ref 10–44)
ANION GAP SERPL CALC-SCNC: 10 MMOL/L (ref 8–16)
ANION GAP SERPL CALC-SCNC: 10 MMOL/L (ref 8–16)
ANION GAP SERPL CALC-SCNC: 12 MMOL/L (ref 8–16)
ANION GAP SERPL CALC-SCNC: 5 MMOL/L (ref 8–16)
ANION GAP SERPL CALC-SCNC: 6 MMOL/L (ref 8–16)
ANION GAP SERPL CALC-SCNC: 7 MMOL/L (ref 8–16)
ANION GAP SERPL CALC-SCNC: 7 MMOL/L (ref 8–16)
ANION GAP SERPL CALC-SCNC: 8 MMOL/L (ref 8–16)
ANION GAP SERPL CALC-SCNC: 8 MMOL/L (ref 8–16)
ANION GAP SERPL CALC-SCNC: 9 MMOL/L (ref 8–16)
APTT BLDCRRT: 28.6 SEC (ref 21–32)
APTT PPP: 36.6 SEC (ref 23.6–33.3)
AST SERPL-CCNC: 17 U/L (ref 10–40)
BASOPHILS # BLD AUTO: 0.01 K/UL (ref 0–0.2)
BASOPHILS # BLD AUTO: 0.01 K/UL (ref 0–0.2)
BASOPHILS # BLD AUTO: 0.02 K/UL (ref 0–0.2)
BASOPHILS # BLD AUTO: 0.03 K/UL (ref 0–0.2)
BASOPHILS # BLD AUTO: 0.04 K/UL (ref 0–0.2)
BASOPHILS # BLD AUTO: 0.04 K/UL (ref 0–0.2)
BASOPHILS # BLD AUTO: 0.06 K/UL (ref 0–0.2)
BASOPHILS # BLD AUTO: 0.07 K/UL (ref 0–0.2)
BASOPHILS # BLD AUTO: 0.07 K/UL (ref 0–0.2)
BASOPHILS # BLD AUTO: 0.08 K/UL (ref 0–0.2)
BASOPHILS # BLD AUTO: 0.08 K/UL (ref 0–0.2)
BASOPHILS NFR BLD: 0.1 % (ref 0–1.9)
BASOPHILS NFR BLD: 0.1 % (ref 0–1.9)
BASOPHILS NFR BLD: 0.2 % (ref 0–1.9)
BASOPHILS NFR BLD: 0.2 % (ref 0–1.9)
BASOPHILS NFR BLD: 0.4 % (ref 0–1.9)
BASOPHILS NFR BLD: 0.6 % (ref 0–1.9)
BASOPHILS NFR BLD: 0.8 % (ref 0–1.9)
BASOPHILS NFR BLD: 0.9 % (ref 0–1.9)
BASOPHILS NFR BLD: 1.3 % (ref 0–1.9)
BASOPHILS NFR BLD: 1.3 % (ref 0–1.9)
BASOPHILS NFR BLD: 1.5 % (ref 0–1.9)
BILIRUB SERPL-MCNC: 0.6 MG/DL (ref 0.1–1)
BLD GP AB SCN CELLS X3 SERPL QL: NORMAL
BLD GP AB SCN CELLS X3 SERPL QL: NORMAL
BUN SERPL-MCNC: 15 MG/DL (ref 8–23)
BUN SERPL-MCNC: 16 MG/DL (ref 8–23)
BUN SERPL-MCNC: 18 MG/DL (ref 8–23)
BUN SERPL-MCNC: 18 MG/DL (ref 8–23)
BUN SERPL-MCNC: 19 MG/DL (ref 8–23)
BUN SERPL-MCNC: 19 MG/DL (ref 8–23)
BUN SERPL-MCNC: 25 MG/DL (ref 8–23)
BUN SERPL-MCNC: 26 MG/DL (ref 8–23)
BUN SERPL-MCNC: 28 MG/DL (ref 8–23)
BUN SERPL-MCNC: 35 MG/DL (ref 8–23)
BUN SERPL-MCNC: 35 MG/DL (ref 8–23)
BUN SERPL-MCNC: 38 MG/DL (ref 8–23)
CALCIUM SERPL-MCNC: 10.2 MG/DL (ref 8.7–10.5)
CALCIUM SERPL-MCNC: 10.3 MG/DL (ref 8.7–10.5)
CALCIUM SERPL-MCNC: 10.4 MG/DL (ref 8.7–10.5)
CALCIUM SERPL-MCNC: 11.2 MG/DL (ref 8.7–10.5)
CALCIUM SERPL-MCNC: 12 MG/DL (ref 8.7–10.5)
CALCIUM SERPL-MCNC: 12 MG/DL (ref 8.7–10.5)
CALCIUM SERPL-MCNC: 12.2 MG/DL (ref 8.7–10.5)
CALCIUM SERPL-MCNC: 8.2 MG/DL (ref 8.7–10.5)
CALCIUM SERPL-MCNC: 8.3 MG/DL (ref 8.7–10.5)
CALCIUM SERPL-MCNC: 8.5 MG/DL (ref 8.7–10.5)
CALCIUM SERPL-MCNC: 9.6 MG/DL (ref 8.7–10.5)
CALCIUM SERPL-MCNC: 9.9 MG/DL (ref 8.7–10.5)
CHLORIDE SERPL-SCNC: 103 MMOL/L (ref 95–110)
CHLORIDE SERPL-SCNC: 106 MMOL/L (ref 95–110)
CHLORIDE SERPL-SCNC: 108 MMOL/L (ref 95–110)
CHLORIDE SERPL-SCNC: 108 MMOL/L (ref 95–110)
CHLORIDE SERPL-SCNC: 109 MMOL/L (ref 95–110)
CHLORIDE SERPL-SCNC: 110 MMOL/L (ref 95–110)
CHLORIDE SERPL-SCNC: 110 MMOL/L (ref 95–110)
CHLORIDE SERPL-SCNC: 111 MMOL/L (ref 95–110)
CO2 SERPL-SCNC: 16 MMOL/L (ref 23–29)
CO2 SERPL-SCNC: 20 MMOL/L (ref 23–29)
CO2 SERPL-SCNC: 21 MMOL/L (ref 23–29)
CO2 SERPL-SCNC: 22 MMOL/L (ref 23–29)
CO2 SERPL-SCNC: 23 MMOL/L (ref 23–29)
CO2 SERPL-SCNC: 24 MMOL/L (ref 23–29)
CO2 SERPL-SCNC: 25 MMOL/L (ref 23–29)
CO2 SERPL-SCNC: 26 MMOL/L (ref 23–29)
CREAT SERPL-MCNC: 1.2 MG/DL (ref 0.5–1.4)
CREAT SERPL-MCNC: 1.3 MG/DL (ref 0.5–1.4)
CREAT SERPL-MCNC: 1.4 MG/DL (ref 0.5–1.4)
CREAT SERPL-MCNC: 1.4 MG/DL (ref 0.5–1.4)
CREAT SERPL-MCNC: 1.5 MG/DL (ref 0.5–1.4)
DELSYS: ABNORMAL
DIFFERENTIAL METHOD: ABNORMAL
EOSINOPHIL # BLD AUTO: 0 K/UL (ref 0–0.5)
EOSINOPHIL # BLD AUTO: 0.1 K/UL (ref 0–0.5)
EOSINOPHIL # BLD AUTO: 0.3 K/UL (ref 0–0.5)
EOSINOPHIL # BLD AUTO: 0.4 K/UL (ref 0–0.5)
EOSINOPHIL NFR BLD: 0 % (ref 0–8)
EOSINOPHIL NFR BLD: 0.4 % (ref 0–8)
EOSINOPHIL NFR BLD: 1.6 % (ref 0–8)
EOSINOPHIL NFR BLD: 4 % (ref 0–8)
EOSINOPHIL NFR BLD: 4.7 % (ref 0–8)
EOSINOPHIL NFR BLD: 4.7 % (ref 0–8)
EOSINOPHIL NFR BLD: 6.3 % (ref 0–8)
EOSINOPHIL NFR BLD: 6.9 % (ref 0–8)
ERYTHROCYTE [DISTWIDTH] IN BLOOD BY AUTOMATED COUNT: 13.2 % (ref 11.5–14.5)
ERYTHROCYTE [DISTWIDTH] IN BLOOD BY AUTOMATED COUNT: 13.3 % (ref 11.5–14.5)
ERYTHROCYTE [DISTWIDTH] IN BLOOD BY AUTOMATED COUNT: 13.4 % (ref 11.5–14.5)
ERYTHROCYTE [DISTWIDTH] IN BLOOD BY AUTOMATED COUNT: 13.4 % (ref 11.5–14.5)
ERYTHROCYTE [DISTWIDTH] IN BLOOD BY AUTOMATED COUNT: 13.5 % (ref 11.5–14.5)
ERYTHROCYTE [DISTWIDTH] IN BLOOD BY AUTOMATED COUNT: 13.5 % (ref 11.5–14.5)
ERYTHROCYTE [DISTWIDTH] IN BLOOD BY AUTOMATED COUNT: 13.6 % (ref 11.5–14.5)
ERYTHROCYTE [DISTWIDTH] IN BLOOD BY AUTOMATED COUNT: 13.6 % (ref 11.5–14.5)
ERYTHROCYTE [DISTWIDTH] IN BLOOD BY AUTOMATED COUNT: 13.7 % (ref 11.5–14.5)
ERYTHROCYTE [DISTWIDTH] IN BLOOD BY AUTOMATED COUNT: 14.4 % (ref 11.5–14.5)
EST. GFR  (AFRICAN AMERICAN): 54.1 ML/MIN/1.73 M^2
EST. GFR  (AFRICAN AMERICAN): 58.8 ML/MIN/1.73 M^2
EST. GFR  (AFRICAN AMERICAN): 58.8 ML/MIN/1.73 M^2
EST. GFR  (AFRICAN AMERICAN): >60 ML/MIN/1.73 M^2
EST. GFR  (NON AFRICAN AMERICAN): 46.8 ML/MIN/1.73 M^2
EST. GFR  (NON AFRICAN AMERICAN): 50.9 ML/MIN/1.73 M^2
EST. GFR  (NON AFRICAN AMERICAN): 50.9 ML/MIN/1.73 M^2
EST. GFR  (NON AFRICAN AMERICAN): 55.7 ML/MIN/1.73 M^2
EST. GFR  (NON AFRICAN AMERICAN): >60 ML/MIN/1.73 M^2
FINAL PATHOLOGIC DIAGNOSIS: NORMAL
GLUCOSE SERPL-MCNC: 101 MG/DL (ref 70–110)
GLUCOSE SERPL-MCNC: 107 MG/DL (ref 70–110)
GLUCOSE SERPL-MCNC: 113 MG/DL (ref 70–110)
GLUCOSE SERPL-MCNC: 113 MG/DL (ref 70–110)
GLUCOSE SERPL-MCNC: 120 MG/DL (ref 70–110)
GLUCOSE SERPL-MCNC: 143 MG/DL (ref 70–110)
GLUCOSE SERPL-MCNC: 143 MG/DL (ref 70–110)
GLUCOSE SERPL-MCNC: 147 MG/DL (ref 70–110)
GLUCOSE SERPL-MCNC: 155 MG/DL (ref 70–110)
GLUCOSE SERPL-MCNC: 157 MG/DL (ref 70–110)
GLUCOSE SERPL-MCNC: 238 MG/DL (ref 70–110)
GLUCOSE SERPL-MCNC: 98 MG/DL (ref 70–110)
GROSS: NORMAL
HCO3 UR-SCNC: 14.5 MMOL/L (ref 24–28)
HCT VFR BLD AUTO: 30.5 % (ref 40–54)
HCT VFR BLD AUTO: 30.8 % (ref 40–54)
HCT VFR BLD AUTO: 32.7 % (ref 40–54)
HCT VFR BLD AUTO: 33.8 % (ref 40–54)
HCT VFR BLD AUTO: 35.3 % (ref 40–54)
HCT VFR BLD AUTO: 36.3 % (ref 40–54)
HCT VFR BLD AUTO: 36.4 % (ref 40–54)
HCT VFR BLD AUTO: 36.4 % (ref 40–54)
HCT VFR BLD AUTO: 36.5 % (ref 40–54)
HCT VFR BLD AUTO: 36.8 % (ref 40–54)
HCT VFR BLD AUTO: 37.8 % (ref 40–54)
HCT VFR BLD AUTO: 38.5 % (ref 40–54)
HGB BLD-MCNC: 10 G/DL (ref 14–18)
HGB BLD-MCNC: 10.2 G/DL (ref 14–18)
HGB BLD-MCNC: 10.7 G/DL (ref 14–18)
HGB BLD-MCNC: 11 G/DL (ref 14–18)
HGB BLD-MCNC: 11.2 G/DL (ref 14–18)
HGB BLD-MCNC: 11.3 G/DL (ref 14–18)
HGB BLD-MCNC: 11.3 G/DL (ref 14–18)
HGB BLD-MCNC: 11.4 G/DL (ref 14–18)
HGB BLD-MCNC: 11.6 G/DL (ref 14–18)
HGB BLD-MCNC: 11.6 G/DL (ref 14–18)
HGB BLD-MCNC: 9.7 G/DL (ref 14–18)
HGB BLD-MCNC: 9.8 G/DL (ref 14–18)
IMM GRANULOCYTES # BLD AUTO: 0.01 K/UL (ref 0–0.04)
IMM GRANULOCYTES # BLD AUTO: 0.02 K/UL (ref 0–0.04)
IMM GRANULOCYTES # BLD AUTO: 0.03 K/UL (ref 0–0.04)
IMM GRANULOCYTES # BLD AUTO: 0.03 K/UL (ref 0–0.04)
IMM GRANULOCYTES # BLD AUTO: 0.04 K/UL (ref 0–0.04)
IMM GRANULOCYTES # BLD AUTO: 0.04 K/UL (ref 0–0.04)
IMM GRANULOCYTES # BLD AUTO: 0.07 K/UL (ref 0–0.04)
IMM GRANULOCYTES # BLD AUTO: 0.07 K/UL (ref 0–0.04)
IMM GRANULOCYTES # BLD AUTO: 0.21 K/UL (ref 0–0.04)
IMM GRANULOCYTES NFR BLD AUTO: 0.2 % (ref 0–0.5)
IMM GRANULOCYTES NFR BLD AUTO: 0.3 % (ref 0–0.5)
IMM GRANULOCYTES NFR BLD AUTO: 0.4 % (ref 0–0.5)
IMM GRANULOCYTES NFR BLD AUTO: 0.5 % (ref 0–0.5)
IMM GRANULOCYTES NFR BLD AUTO: 0.5 % (ref 0–0.5)
IMM GRANULOCYTES NFR BLD AUTO: 0.6 % (ref 0–0.5)
IMM GRANULOCYTES NFR BLD AUTO: 1 % (ref 0–0.5)
IMM GRANULOCYTES NFR BLD AUTO: 1.5 % (ref 0–0.5)
INR PPP: 1 (ref 0.8–1.2)
INR PPP: 1.2
LACTATE SERPL-SCNC: 1.7 MMOL/L (ref 0.5–2.2)
LYMPHOCYTES # BLD AUTO: 0.6 K/UL (ref 1–4.8)
LYMPHOCYTES # BLD AUTO: 0.7 K/UL (ref 1–4.8)
LYMPHOCYTES # BLD AUTO: 0.8 K/UL (ref 1–4.8)
LYMPHOCYTES # BLD AUTO: 0.9 K/UL (ref 1–4.8)
LYMPHOCYTES # BLD AUTO: 0.9 K/UL (ref 1–4.8)
LYMPHOCYTES # BLD AUTO: 1.1 K/UL (ref 1–4.8)
LYMPHOCYTES # BLD AUTO: 1.3 K/UL (ref 1–4.8)
LYMPHOCYTES # BLD AUTO: 1.4 K/UL (ref 1–4.8)
LYMPHOCYTES # BLD AUTO: 1.9 K/UL (ref 1–4.8)
LYMPHOCYTES NFR BLD: 11.5 % (ref 18–48)
LYMPHOCYTES NFR BLD: 17.2 % (ref 18–48)
LYMPHOCYTES NFR BLD: 17.3 % (ref 18–48)
LYMPHOCYTES NFR BLD: 17.5 % (ref 18–48)
LYMPHOCYTES NFR BLD: 20.8 % (ref 18–48)
LYMPHOCYTES NFR BLD: 22.1 % (ref 18–48)
LYMPHOCYTES NFR BLD: 24 % (ref 18–48)
LYMPHOCYTES NFR BLD: 7.7 % (ref 18–48)
LYMPHOCYTES NFR BLD: 7.7 % (ref 18–48)
LYMPHOCYTES NFR BLD: 8 % (ref 18–48)
LYMPHOCYTES NFR BLD: 9.2 % (ref 18–48)
MAGNESIUM SERPL-MCNC: 1.6 MG/DL (ref 1.6–2.6)
MCH RBC QN AUTO: 26.4 PG (ref 27–31)
MCH RBC QN AUTO: 26.5 PG (ref 27–31)
MCH RBC QN AUTO: 26.7 PG (ref 27–31)
MCH RBC QN AUTO: 26.7 PG (ref 27–31)
MCH RBC QN AUTO: 26.8 PG (ref 27–31)
MCH RBC QN AUTO: 26.9 PG (ref 27–31)
MCH RBC QN AUTO: 27.1 PG (ref 27–31)
MCH RBC QN AUTO: 27.1 PG (ref 27–31)
MCHC RBC AUTO-ENTMCNC: 29.4 G/DL (ref 32–36)
MCHC RBC AUTO-ENTMCNC: 29.9 G/DL (ref 32–36)
MCHC RBC AUTO-ENTMCNC: 30.2 G/DL (ref 32–36)
MCHC RBC AUTO-ENTMCNC: 30.3 G/DL (ref 32–36)
MCHC RBC AUTO-ENTMCNC: 30.6 G/DL (ref 32–36)
MCHC RBC AUTO-ENTMCNC: 30.7 G/DL (ref 32–36)
MCHC RBC AUTO-ENTMCNC: 30.7 G/DL (ref 32–36)
MCHC RBC AUTO-ENTMCNC: 31.1 G/DL (ref 32–36)
MCHC RBC AUTO-ENTMCNC: 31.3 G/DL (ref 32–36)
MCHC RBC AUTO-ENTMCNC: 31.5 G/DL (ref 32–36)
MCHC RBC AUTO-ENTMCNC: 31.9 G/DL (ref 32–36)
MCHC RBC AUTO-ENTMCNC: 32.1 G/DL (ref 32–36)
MCV RBC AUTO: 85 FL (ref 82–98)
MCV RBC AUTO: 86 FL (ref 82–98)
MCV RBC AUTO: 86 FL (ref 82–98)
MCV RBC AUTO: 87 FL (ref 82–98)
MCV RBC AUTO: 89 FL (ref 82–98)
MCV RBC AUTO: 90 FL (ref 82–98)
MCV RBC AUTO: 90 FL (ref 82–98)
MONOCYTES # BLD AUTO: 0.3 K/UL (ref 0.3–1)
MONOCYTES # BLD AUTO: 0.3 K/UL (ref 0.3–1)
MONOCYTES # BLD AUTO: 0.4 K/UL (ref 0.3–1)
MONOCYTES # BLD AUTO: 0.6 K/UL (ref 0.3–1)
MONOCYTES # BLD AUTO: 0.7 K/UL (ref 0.3–1)
MONOCYTES # BLD AUTO: 0.8 K/UL (ref 0.3–1)
MONOCYTES # BLD AUTO: 0.9 K/UL (ref 0.3–1)
MONOCYTES # BLD AUTO: 1 K/UL (ref 0.3–1)
MONOCYTES # BLD AUTO: 1.1 K/UL (ref 0.3–1)
MONOCYTES NFR BLD: 11.1 % (ref 4–15)
MONOCYTES NFR BLD: 12.2 % (ref 4–15)
MONOCYTES NFR BLD: 12.6 % (ref 4–15)
MONOCYTES NFR BLD: 13.3 % (ref 4–15)
MONOCYTES NFR BLD: 13.7 % (ref 4–15)
MONOCYTES NFR BLD: 14.9 % (ref 4–15)
MONOCYTES NFR BLD: 3.7 % (ref 4–15)
MONOCYTES NFR BLD: 3.8 % (ref 4–15)
MONOCYTES NFR BLD: 4.7 % (ref 4–15)
MONOCYTES NFR BLD: 4.7 % (ref 4–15)
MONOCYTES NFR BLD: 6.9 % (ref 4–15)
NEUTROPHILS # BLD AUTO: 11.8 K/UL (ref 1.8–7.7)
NEUTROPHILS # BLD AUTO: 2.9 K/UL (ref 1.8–7.7)
NEUTROPHILS # BLD AUTO: 3.3 K/UL (ref 1.8–7.7)
NEUTROPHILS # BLD AUTO: 3.4 K/UL (ref 1.8–7.7)
NEUTROPHILS # BLD AUTO: 4.8 K/UL (ref 1.8–7.7)
NEUTROPHILS # BLD AUTO: 4.8 K/UL (ref 1.8–7.7)
NEUTROPHILS # BLD AUTO: 5.8 K/UL (ref 1.8–7.7)
NEUTROPHILS # BLD AUTO: 6 K/UL (ref 1.8–7.7)
NEUTROPHILS # BLD AUTO: 6.3 K/UL (ref 1.8–7.7)
NEUTROPHILS # BLD AUTO: 7.2 K/UL (ref 1.8–7.7)
NEUTROPHILS # BLD AUTO: 9.7 K/UL (ref 1.8–7.7)
NEUTROPHILS NFR BLD: 55.1 % (ref 38–73)
NEUTROPHILS NFR BLD: 57.1 % (ref 38–73)
NEUTROPHILS NFR BLD: 60.6 % (ref 38–73)
NEUTROPHILS NFR BLD: 63.7 % (ref 38–73)
NEUTROPHILS NFR BLD: 64.7 % (ref 38–73)
NEUTROPHILS NFR BLD: 65.8 % (ref 38–73)
NEUTROPHILS NFR BLD: 84.2 % (ref 38–73)
NEUTROPHILS NFR BLD: 84.6 % (ref 38–73)
NEUTROPHILS NFR BLD: 85 % (ref 38–73)
NEUTROPHILS NFR BLD: 85.6 % (ref 38–73)
NEUTROPHILS NFR BLD: 87.1 % (ref 38–73)
NRBC BLD-RTO: 0 /100 WBC
PCO2 BLDA: 25.2 MMHG (ref 35–45)
PH SMN: 7.37 [PH] (ref 7.35–7.45)
PHOSPHATE SERPL-MCNC: 1.9 MG/DL (ref 2.7–4.5)
PLATELET # BLD AUTO: 169 K/UL (ref 150–350)
PLATELET # BLD AUTO: 209 K/UL (ref 150–350)
PLATELET # BLD AUTO: 221 K/UL (ref 150–350)
PLATELET # BLD AUTO: 223 K/UL (ref 150–350)
PLATELET # BLD AUTO: 224 K/UL (ref 150–350)
PLATELET # BLD AUTO: 240 K/UL (ref 150–350)
PLATELET # BLD AUTO: 244 K/UL (ref 150–350)
PLATELET # BLD AUTO: 262 K/UL (ref 150–350)
PLATELET # BLD AUTO: 267 K/UL (ref 150–350)
PLATELET # BLD AUTO: 270 K/UL (ref 150–350)
PLATELET # BLD AUTO: 281 K/UL (ref 150–350)
PLATELET # BLD AUTO: 404 K/UL (ref 150–350)
PMV BLD AUTO: 8.6 FL (ref 9.2–12.9)
PMV BLD AUTO: 8.7 FL (ref 9.2–12.9)
PMV BLD AUTO: 8.8 FL (ref 9.2–12.9)
PMV BLD AUTO: 8.8 FL (ref 9.2–12.9)
PMV BLD AUTO: 8.9 FL (ref 9.2–12.9)
PMV BLD AUTO: 9.1 FL (ref 9.2–12.9)
PMV BLD AUTO: 9.8 FL (ref 9.2–12.9)
PO2 BLDA: 65 MMHG (ref 80–100)
POC BE: -11 MMOL/L
POC SATURATED O2: 92 % (ref 95–100)
POC TCO2: 15 MMOL/L (ref 23–27)
POTASSIUM SERPL-SCNC: 3.7 MMOL/L (ref 3.5–5.1)
POTASSIUM SERPL-SCNC: 3.8 MMOL/L (ref 3.5–5.1)
POTASSIUM SERPL-SCNC: 4 MMOL/L (ref 3.5–5.1)
POTASSIUM SERPL-SCNC: 4 MMOL/L (ref 3.5–5.1)
POTASSIUM SERPL-SCNC: 4.1 MMOL/L (ref 3.5–5.1)
POTASSIUM SERPL-SCNC: 4.2 MMOL/L (ref 3.5–5.1)
POTASSIUM SERPL-SCNC: 4.4 MMOL/L (ref 3.5–5.1)
POTASSIUM SERPL-SCNC: 4.4 MMOL/L (ref 3.5–5.1)
POTASSIUM SERPL-SCNC: 4.6 MMOL/L (ref 3.5–5.1)
PROT SERPL-MCNC: 7.7 G/DL (ref 6–8.4)
PROTHROMBIN TIME: 10.4 SEC (ref 9–12.5)
PROTHROMBIN TIME: 14.8 SEC (ref 10.6–14.8)
RBC # BLD AUTO: 3.61 M/UL (ref 4.6–6.2)
RBC # BLD AUTO: 3.62 M/UL (ref 4.6–6.2)
RBC # BLD AUTO: 3.74 M/UL (ref 4.6–6.2)
RBC # BLD AUTO: 3.79 M/UL (ref 4.6–6.2)
RBC # BLD AUTO: 4.04 M/UL (ref 4.6–6.2)
RBC # BLD AUTO: 4.11 M/UL (ref 4.6–6.2)
RBC # BLD AUTO: 4.19 M/UL (ref 4.6–6.2)
RBC # BLD AUTO: 4.21 M/UL (ref 4.6–6.2)
RBC # BLD AUTO: 4.25 M/UL (ref 4.6–6.2)
RBC # BLD AUTO: 4.28 M/UL (ref 4.6–6.2)
RBC # BLD AUTO: 4.28 M/UL (ref 4.6–6.2)
RBC # BLD AUTO: 4.33 M/UL (ref 4.6–6.2)
SAMPLE: ABNORMAL
SITE: ABNORMAL
SODIUM SERPL-SCNC: 137 MMOL/L (ref 136–145)
SODIUM SERPL-SCNC: 138 MMOL/L (ref 136–145)
SODIUM SERPL-SCNC: 139 MMOL/L (ref 136–145)
SODIUM SERPL-SCNC: 141 MMOL/L (ref 136–145)
SODIUM SERPL-SCNC: 141 MMOL/L (ref 136–145)
SODIUM SERPL-SCNC: 142 MMOL/L (ref 136–145)
WBC # BLD AUTO: 11.45 K/UL (ref 3.9–12.7)
WBC # BLD AUTO: 13.77 K/UL (ref 3.9–12.7)
WBC # BLD AUTO: 4.8 K/UL (ref 3.9–12.7)
WBC # BLD AUTO: 5.95 K/UL (ref 3.9–12.7)
WBC # BLD AUTO: 6.06 K/UL (ref 3.9–12.7)
WBC # BLD AUTO: 7.05 K/UL (ref 3.9–12.7)
WBC # BLD AUTO: 7.23 K/UL (ref 3.9–12.7)
WBC # BLD AUTO: 7.47 K/UL (ref 3.9–12.7)
WBC # BLD AUTO: 7.54 K/UL (ref 3.9–12.7)
WBC # BLD AUTO: 8.27 K/UL (ref 3.9–12.7)
WBC # BLD AUTO: 8.75 K/UL (ref 3.9–12.7)
WBC # BLD AUTO: 8.96 K/UL (ref 3.9–12.7)

## 2020-01-01 PROCEDURE — 99223 PR INITIAL HOSPITAL CARE,LEVL III: ICD-10-PCS | Mod: ,,, | Performed by: INTERNAL MEDICINE

## 2020-01-01 PROCEDURE — 25000003 PHARM REV CODE 250: Performed by: STUDENT IN AN ORGANIZED HEALTH CARE EDUCATION/TRAINING PROGRAM

## 2020-01-01 PROCEDURE — 80053 COMPREHEN METABOLIC PANEL: CPT

## 2020-01-01 PROCEDURE — 63600175 PHARM REV CODE 636 W HCPCS: Performed by: PHYSICIAN ASSISTANT

## 2020-01-01 PROCEDURE — 63600175 PHARM REV CODE 636 W HCPCS: Performed by: STUDENT IN AN ORGANIZED HEALTH CARE EDUCATION/TRAINING PROGRAM

## 2020-01-01 PROCEDURE — 63600175 PHARM REV CODE 636 W HCPCS: Performed by: NEUROLOGICAL SURGERY

## 2020-01-01 PROCEDURE — 97165 OT EVAL LOW COMPLEX 30 MIN: CPT

## 2020-01-01 PROCEDURE — 97802 MEDICAL NUTRITION INDIV IN: CPT

## 2020-01-01 PROCEDURE — 25000003 PHARM REV CODE 250: Performed by: PHYSICIAN ASSISTANT

## 2020-01-01 PROCEDURE — 97110 THERAPEUTIC EXERCISES: CPT | Mod: PN,CQ

## 2020-01-01 PROCEDURE — 99233 SBSQ HOSP IP/OBS HIGH 50: CPT | Mod: ,,, | Performed by: NEUROLOGICAL SURGERY

## 2020-01-01 PROCEDURE — 3074F SYST BP LT 130 MM HG: CPT | Mod: CPTII,S$GLB,, | Performed by: NEUROLOGICAL SURGERY

## 2020-01-01 PROCEDURE — 25000003 PHARM REV CODE 250: Performed by: NEUROLOGICAL SURGERY

## 2020-01-01 PROCEDURE — 80048 BASIC METABOLIC PNL TOTAL CA: CPT

## 2020-01-01 PROCEDURE — 97166 OT EVAL MOD COMPLEX 45 MIN: CPT

## 2020-01-01 PROCEDURE — 11000001 HC ACUTE MED/SURG PRIVATE ROOM

## 2020-01-01 PROCEDURE — 36415 COLL VENOUS BLD VENIPUNCTURE: CPT

## 2020-01-01 PROCEDURE — 94761 N-INVAS EAR/PLS OXIMETRY MLT: CPT

## 2020-01-01 PROCEDURE — 97116 GAIT TRAINING THERAPY: CPT

## 2020-01-01 PROCEDURE — 99024 POSTOP FOLLOW-UP VISIT: CPT | Mod: ,,, | Performed by: PHYSICIAN ASSISTANT

## 2020-01-01 PROCEDURE — 99024 PR POST-OP FOLLOW-UP VISIT: ICD-10-PCS | Mod: S$GLB,,, | Performed by: NEUROLOGICAL SURGERY

## 2020-01-01 PROCEDURE — 99232 PR SUBSEQUENT HOSPITAL CARE,LEVL II: ICD-10-PCS | Mod: ,,, | Performed by: HOSPITALIST

## 2020-01-01 PROCEDURE — 99232 SBSQ HOSP IP/OBS MODERATE 35: CPT | Mod: ,,, | Performed by: HOSPITALIST

## 2020-01-01 PROCEDURE — 37000009 HC ANESTHESIA EA ADD 15 MINS: Performed by: NEUROLOGICAL SURGERY

## 2020-01-01 PROCEDURE — 99024 POSTOP FOLLOW-UP VISIT: CPT | Mod: S$GLB,,, | Performed by: NEUROLOGICAL SURGERY

## 2020-01-01 PROCEDURE — 99232 PR SUBSEQUENT HOSPITAL CARE,LEVL II: ICD-10-PCS | Mod: ,,, | Performed by: CLINICAL NURSE SPECIALIST

## 2020-01-01 PROCEDURE — D9220A PRA ANESTHESIA: Mod: ANES,,, | Performed by: ANESTHESIOLOGY

## 2020-01-01 PROCEDURE — 37000008 HC ANESTHESIA 1ST 15 MINUTES: Performed by: NEUROLOGICAL SURGERY

## 2020-01-01 PROCEDURE — 3078F DIAST BP <80 MM HG: CPT | Mod: CPTII,S$GLB,, | Performed by: NEUROLOGICAL SURGERY

## 2020-01-01 PROCEDURE — 25000003 PHARM REV CODE 250: Performed by: INTERNAL MEDICINE

## 2020-01-01 PROCEDURE — 97116 GAIT TRAINING THERAPY: CPT | Mod: PN,CQ

## 2020-01-01 PROCEDURE — 99233 SBSQ HOSP IP/OBS HIGH 50: CPT | Mod: ,,, | Performed by: PHYSICIAN ASSISTANT

## 2020-01-01 PROCEDURE — 85730 THROMBOPLASTIN TIME PARTIAL: CPT

## 2020-01-01 PROCEDURE — C1729 CATH, DRAINAGE: HCPCS | Performed by: NEUROLOGICAL SURGERY

## 2020-01-01 PROCEDURE — 85025 COMPLETE CBC W/AUTO DIFF WBC: CPT

## 2020-01-01 PROCEDURE — 88307 PR  SURG PATH,LEVEL V: ICD-10-PCS | Mod: 26,,, | Performed by: PATHOLOGY

## 2020-01-01 PROCEDURE — 25000003 PHARM REV CODE 250: Performed by: NURSE ANESTHETIST, CERTIFIED REGISTERED

## 2020-01-01 PROCEDURE — 99024 PR POST-OP FOLLOW-UP VISIT: ICD-10-PCS | Mod: ,,, | Performed by: PHYSICIAN ASSISTANT

## 2020-01-01 PROCEDURE — 1159F PR MEDICATION LIST DOCUMENTED IN MEDICAL RECORD: ICD-10-PCS | Mod: S$GLB,,, | Performed by: NEUROLOGICAL SURGERY

## 2020-01-01 PROCEDURE — G0378 HOSPITAL OBSERVATION PER HR: HCPCS

## 2020-01-01 PROCEDURE — 84100 ASSAY OF PHOSPHORUS: CPT

## 2020-01-01 PROCEDURE — 96372 THER/PROPH/DIAG INJ SC/IM: CPT | Mod: 59

## 2020-01-01 PROCEDURE — 99999 PR PBB SHADOW E&M-EST. PATIENT-LVL III: ICD-10-PCS | Mod: PBBFAC,,, | Performed by: NEUROLOGICAL SURGERY

## 2020-01-01 PROCEDURE — 99999 PR PBB SHADOW E&M-EST. PATIENT-LVL III: CPT | Mod: PBBFAC,,, | Performed by: NEUROLOGICAL SURGERY

## 2020-01-01 PROCEDURE — 99233 PR SUBSEQUENT HOSPITAL CARE,LEVL III: ICD-10-PCS | Mod: ,,, | Performed by: PHYSICIAN ASSISTANT

## 2020-01-01 PROCEDURE — 25000003 PHARM REV CODE 250: Performed by: EMERGENCY MEDICINE

## 2020-01-01 PROCEDURE — 99284 EMERGENCY DEPT VISIT MOD MDM: CPT | Mod: 25

## 2020-01-01 PROCEDURE — 63600175 PHARM REV CODE 636 W HCPCS: Performed by: NURSE ANESTHETIST, CERTIFIED REGISTERED

## 2020-01-01 PROCEDURE — 97162 PT EVAL MOD COMPLEX 30 MIN: CPT

## 2020-01-01 PROCEDURE — 99233 SBSQ HOSP IP/OBS HIGH 50: CPT | Mod: ,,, | Performed by: CLINICAL NURSE SPECIALIST

## 2020-01-01 PROCEDURE — 25000242 PHARM REV CODE 250 ALT 637 W/ HCPCS: Performed by: ANESTHESIOLOGY

## 2020-01-01 PROCEDURE — 99231 PR SUBSEQUENT HOSPITAL CARE,LEVL I: ICD-10-PCS | Mod: ,,, | Performed by: ANESTHESIOLOGY

## 2020-01-01 PROCEDURE — 63281 BX/EXC IDRL SPINE LESN THRC: CPT | Mod: 22,,, | Performed by: NEUROLOGICAL SURGERY

## 2020-01-01 PROCEDURE — 72148 MRI LUMBAR SPINE W/O DYE: CPT | Mod: TC

## 2020-01-01 PROCEDURE — 99231 SBSQ HOSP IP/OBS SF/LOW 25: CPT | Mod: ,,, | Performed by: ANESTHESIOLOGY

## 2020-01-01 PROCEDURE — A9585 GADOBUTROL INJECTION: HCPCS | Performed by: INTERNAL MEDICINE

## 2020-01-01 PROCEDURE — 63600175 PHARM REV CODE 636 W HCPCS: Performed by: INTERNAL MEDICINE

## 2020-01-01 PROCEDURE — 97165 OT EVAL LOW COMPLEX 30 MIN: CPT | Mod: PN

## 2020-01-01 PROCEDURE — 97161 PT EVAL LOW COMPLEX 20 MIN: CPT | Mod: PN

## 2020-01-01 PROCEDURE — 88341 IMHCHEM/IMCYTCHM EA ADD ANTB: CPT | Mod: 59 | Performed by: PATHOLOGY

## 2020-01-01 PROCEDURE — 1125F AMNT PAIN NOTED PAIN PRSNT: CPT | Mod: S$GLB,,, | Performed by: NEUROLOGICAL SURGERY

## 2020-01-01 PROCEDURE — 99223 1ST HOSP IP/OBS HIGH 75: CPT | Mod: ,,, | Performed by: INTERNAL MEDICINE

## 2020-01-01 PROCEDURE — D9220A PRA ANESTHESIA: Mod: CRNA,,, | Performed by: NURSE ANESTHETIST, CERTIFIED REGISTERED

## 2020-01-01 PROCEDURE — 99232 SBSQ HOSP IP/OBS MODERATE 35: CPT | Mod: ,,, | Performed by: CLINICAL NURSE SPECIALIST

## 2020-01-01 PROCEDURE — 63281 PR BX/EXCIS SPIN TUM,INDUR,XMED,THOR: ICD-10-PCS | Mod: 22,,, | Performed by: NEUROLOGICAL SURGERY

## 2020-01-01 PROCEDURE — 99222 PR INITIAL HOSPITAL CARE,LEVL II: ICD-10-PCS | Mod: 57,AI,GC, | Performed by: NEUROLOGICAL SURGERY

## 2020-01-01 PROCEDURE — 63600175 PHARM REV CODE 636 W HCPCS: Performed by: HOSPITALIST

## 2020-01-01 PROCEDURE — D9220A PRA ANESTHESIA: ICD-10-PCS | Mod: CRNA,,, | Performed by: NURSE ANESTHETIST, CERTIFIED REGISTERED

## 2020-01-01 PROCEDURE — D9220A PRA ANESTHESIA: ICD-10-PCS | Mod: ANES,,, | Performed by: ANESTHESIOLOGY

## 2020-01-01 PROCEDURE — 99497 ADVNCD CARE PLAN 30 MIN: CPT | Mod: ,,, | Performed by: CLINICAL NURSE SPECIALIST

## 2020-01-01 PROCEDURE — 1159F MED LIST DOCD IN RCRD: CPT | Mod: S$GLB,,, | Performed by: NEUROLOGICAL SURGERY

## 2020-01-01 PROCEDURE — 3078F PR MOST RECENT DIASTOLIC BLOOD PRESSURE < 80 MM HG: ICD-10-PCS | Mod: CPTII,S$GLB,, | Performed by: NEUROLOGICAL SURGERY

## 2020-01-01 PROCEDURE — 71000039 HC RECOVERY, EACH ADD'L HOUR: Performed by: NEUROLOGICAL SURGERY

## 2020-01-01 PROCEDURE — 83605 ASSAY OF LACTIC ACID: CPT

## 2020-01-01 PROCEDURE — 63600175 PHARM REV CODE 636 W HCPCS: Performed by: ANESTHESIOLOGY

## 2020-01-01 PROCEDURE — 3074F PR MOST RECENT SYSTOLIC BLOOD PRESSURE < 130 MM HG: ICD-10-PCS | Mod: CPTII,S$GLB,, | Performed by: NEUROLOGICAL SURGERY

## 2020-01-01 PROCEDURE — 27201423 OPTIME MED/SURG SUP & DEVICES STERILE SUPPLY: Performed by: NEUROLOGICAL SURGERY

## 2020-01-01 PROCEDURE — 96374 THER/PROPH/DIAG INJ IV PUSH: CPT

## 2020-01-01 PROCEDURE — 99497 PR ADVNCD CARE PLAN 30 MIN: ICD-10-PCS | Mod: ,,, | Performed by: CLINICAL NURSE SPECIALIST

## 2020-01-01 PROCEDURE — 94640 AIRWAY INHALATION TREATMENT: CPT

## 2020-01-01 PROCEDURE — 99233 PR SUBSEQUENT HOSPITAL CARE,LEVL III: ICD-10-PCS | Mod: ,,, | Performed by: HOSPITALIST

## 2020-01-01 PROCEDURE — 99285 EMERGENCY DEPT VISIT HI MDM: CPT | Mod: 25

## 2020-01-01 PROCEDURE — 36000710: Performed by: NEUROLOGICAL SURGERY

## 2020-01-01 PROCEDURE — 99232 PR SUBSEQUENT HOSPITAL CARE,LEVL II: ICD-10-PCS | Mod: ,,, | Performed by: PHYSICIAN ASSISTANT

## 2020-01-01 PROCEDURE — 99233 PR SUBSEQUENT HOSPITAL CARE,LEVL III: ICD-10-PCS | Mod: ,,, | Performed by: CLINICAL NURSE SPECIALIST

## 2020-01-01 PROCEDURE — 97535 SELF CARE MNGMENT TRAINING: CPT

## 2020-01-01 PROCEDURE — 1125F PR PAIN SEVERITY QUANTIFIED, PAIN PRESENT: ICD-10-PCS | Mod: S$GLB,,, | Performed by: NEUROLOGICAL SURGERY

## 2020-01-01 PROCEDURE — 1101F PR PT FALLS ASSESS DOC 0-1 FALLS W/OUT INJ PAST YR: ICD-10-PCS | Mod: CPTII,S$GLB,, | Performed by: NEUROLOGICAL SURGERY

## 2020-01-01 PROCEDURE — 69990 PR MICROSURG TECHNIQUES,REQ OPER MICROSCOPE: ICD-10-PCS | Mod: ,,, | Performed by: NEUROLOGICAL SURGERY

## 2020-01-01 PROCEDURE — 88342 IMHCHEM/IMCYTCHM 1ST ANTB: CPT | Performed by: PATHOLOGY

## 2020-01-01 PROCEDURE — 69990 MICROSURGERY ADD-ON: CPT | Mod: ,,, | Performed by: NEUROLOGICAL SURGERY

## 2020-01-01 PROCEDURE — 85610 PROTHROMBIN TIME: CPT

## 2020-01-01 PROCEDURE — 1101F PT FALLS ASSESS-DOCD LE1/YR: CPT | Mod: CPTII,S$GLB,, | Performed by: NEUROLOGICAL SURGERY

## 2020-01-01 PROCEDURE — 99222 1ST HOSP IP/OBS MODERATE 55: CPT | Mod: 57,AI,GC, | Performed by: NEUROLOGICAL SURGERY

## 2020-01-01 PROCEDURE — 97110 THERAPEUTIC EXERCISES: CPT | Mod: PN

## 2020-01-01 PROCEDURE — 99233 PR SUBSEQUENT HOSPITAL CARE,LEVL III: ICD-10-PCS | Mod: ,,, | Performed by: NEUROLOGICAL SURGERY

## 2020-01-01 PROCEDURE — A9585 GADOBUTROL INJECTION: HCPCS | Performed by: NEUROLOGICAL SURGERY

## 2020-01-01 PROCEDURE — 85027 COMPLETE CBC AUTOMATED: CPT

## 2020-01-01 PROCEDURE — 80048 BASIC METABOLIC PNL TOTAL CA: CPT | Mod: 91

## 2020-01-01 PROCEDURE — 36000711: Performed by: NEUROLOGICAL SURGERY

## 2020-01-01 PROCEDURE — 88307 TISSUE EXAM BY PATHOLOGIST: CPT | Mod: 26,,, | Performed by: PATHOLOGY

## 2020-01-01 PROCEDURE — 36600 WITHDRAWAL OF ARTERIAL BLOOD: CPT

## 2020-01-01 PROCEDURE — 88307 TISSUE EXAM BY PATHOLOGIST: CPT | Performed by: PATHOLOGY

## 2020-01-01 PROCEDURE — 25500020 PHARM REV CODE 255: Performed by: INTERNAL MEDICINE

## 2020-01-01 PROCEDURE — 97110 THERAPEUTIC EXERCISES: CPT

## 2020-01-01 PROCEDURE — 25000003 PHARM REV CODE 250: Performed by: ANESTHESIOLOGY

## 2020-01-01 PROCEDURE — 86900 BLOOD TYPING SEROLOGIC ABO: CPT

## 2020-01-01 PROCEDURE — 63600175 PHARM REV CODE 636 W HCPCS: Performed by: EMERGENCY MEDICINE

## 2020-01-01 PROCEDURE — 99233 SBSQ HOSP IP/OBS HIGH 50: CPT | Mod: ,,, | Performed by: HOSPITALIST

## 2020-01-01 PROCEDURE — 86901 BLOOD TYPING SEROLOGIC RH(D): CPT

## 2020-01-01 PROCEDURE — 12000002 HC ACUTE/MED SURGE SEMI-PRIVATE ROOM

## 2020-01-01 PROCEDURE — 82803 BLOOD GASES ANY COMBINATION: CPT

## 2020-01-01 PROCEDURE — 97530 THERAPEUTIC ACTIVITIES: CPT

## 2020-01-01 PROCEDURE — P9045 ALBUMIN (HUMAN), 5%, 250 ML: HCPCS | Mod: JG | Performed by: NURSE ANESTHETIST, CERTIFIED REGISTERED

## 2020-01-01 PROCEDURE — 99900035 HC TECH TIME PER 15 MIN (STAT)

## 2020-01-01 PROCEDURE — 83735 ASSAY OF MAGNESIUM: CPT

## 2020-01-01 PROCEDURE — 25500020 PHARM REV CODE 255: Performed by: NEUROLOGICAL SURGERY

## 2020-01-01 PROCEDURE — 71000033 HC RECOVERY, INTIAL HOUR: Performed by: NEUROLOGICAL SURGERY

## 2020-01-01 PROCEDURE — 99232 SBSQ HOSP IP/OBS MODERATE 35: CPT | Mod: ,,, | Performed by: PHYSICIAN ASSISTANT

## 2020-01-01 RX ORDER — METHOCARBAMOL 500 MG/1
1000 TABLET, FILM COATED ORAL 3 TIMES DAILY PRN
Status: DISCONTINUED | OUTPATIENT
Start: 2020-01-01 | End: 2020-01-01

## 2020-01-01 RX ORDER — DEXAMETHASONE 2 MG/1
TABLET ORAL
Qty: 51 TABLET | Refills: 0
Start: 2020-01-01

## 2020-01-01 RX ORDER — GABAPENTIN 300 MG/1
600 CAPSULE ORAL 2 TIMES DAILY
Status: DISCONTINUED | OUTPATIENT
Start: 2020-01-01 | End: 2020-01-01 | Stop reason: HOSPADM

## 2020-01-01 RX ORDER — OXYCODONE HYDROCHLORIDE 10 MG/1
10 TABLET ORAL EVERY 6 HOURS PRN
Status: DISCONTINUED | OUTPATIENT
Start: 2020-01-01 | End: 2020-01-01 | Stop reason: HOSPADM

## 2020-01-01 RX ORDER — OXYCODONE HYDROCHLORIDE 5 MG/1
5 TABLET ORAL EVERY 4 HOURS PRN
Status: DISCONTINUED | OUTPATIENT
Start: 2020-01-01 | End: 2020-01-01 | Stop reason: HOSPADM

## 2020-01-01 RX ORDER — EPHEDRINE SULFATE 50 MG/ML
INJECTION, SOLUTION INTRAVENOUS
Status: DISCONTINUED | OUTPATIENT
Start: 2020-01-01 | End: 2020-01-01

## 2020-01-01 RX ORDER — OXYCODONE AND ACETAMINOPHEN 5; 325 MG/1; MG/1
1 TABLET ORAL EVERY 6 HOURS PRN
Status: DISCONTINUED | OUTPATIENT
Start: 2020-01-01 | End: 2020-01-01 | Stop reason: HOSPADM

## 2020-01-01 RX ORDER — METHOCARBAMOL 500 MG/1
1000 TABLET, FILM COATED ORAL 3 TIMES DAILY
Status: DISCONTINUED | OUTPATIENT
Start: 2020-01-01 | End: 2020-01-01 | Stop reason: HOSPADM

## 2020-01-01 RX ORDER — CEFAZOLIN SODIUM 1 G/3ML
2 INJECTION, POWDER, FOR SOLUTION INTRAMUSCULAR; INTRAVENOUS
Status: DISCONTINUED | OUTPATIENT
Start: 2020-01-01 | End: 2020-01-01

## 2020-01-01 RX ORDER — GLUCAGON 1 MG
1 KIT INJECTION
Status: DISCONTINUED | OUTPATIENT
Start: 2020-01-01 | End: 2020-01-01 | Stop reason: HOSPADM

## 2020-01-01 RX ORDER — BISACODYL 10 MG
10 SUPPOSITORY, RECTAL RECTAL DAILY PRN
Status: DISCONTINUED | OUTPATIENT
Start: 2020-01-01 | End: 2020-01-01 | Stop reason: HOSPADM

## 2020-01-01 RX ORDER — ACETAMINOPHEN 325 MG/1
650 TABLET ORAL EVERY 6 HOURS PRN
Status: DISCONTINUED | OUTPATIENT
Start: 2020-01-01 | End: 2020-01-01 | Stop reason: HOSPADM

## 2020-01-01 RX ORDER — MUPIROCIN 20 MG/G
OINTMENT TOPICAL 2 TIMES DAILY
Status: DISPENSED | OUTPATIENT
Start: 2020-01-01 | End: 2020-01-01

## 2020-01-01 RX ORDER — IBUPROFEN 200 MG
24 TABLET ORAL
Status: DISCONTINUED | OUTPATIENT
Start: 2020-01-01 | End: 2020-01-01 | Stop reason: HOSPADM

## 2020-01-01 RX ORDER — FENTANYL CITRATE 50 UG/ML
INJECTION, SOLUTION INTRAMUSCULAR; INTRAVENOUS
Status: DISCONTINUED | OUTPATIENT
Start: 2020-01-01 | End: 2020-01-01

## 2020-01-01 RX ORDER — GABAPENTIN 300 MG/1
600 CAPSULE ORAL 2 TIMES DAILY
Status: ON HOLD | COMMUNITY
End: 2020-01-01 | Stop reason: HOSPADM

## 2020-01-01 RX ORDER — OXYCODONE HYDROCHLORIDE 10 MG/1
10 TABLET ORAL EVERY 6 HOURS PRN
Refills: 0
Start: 2020-01-01 | End: 2020-01-01

## 2020-01-01 RX ORDER — GABAPENTIN 300 MG/1
300 CAPSULE ORAL 3 TIMES DAILY
Status: DISCONTINUED | OUTPATIENT
Start: 2020-01-01 | End: 2020-01-01

## 2020-01-01 RX ORDER — SODIUM CHLORIDE 9 MG/ML
INJECTION, SOLUTION INTRAVENOUS CONTINUOUS
Status: DISCONTINUED | OUTPATIENT
Start: 2020-01-01 | End: 2020-01-01

## 2020-01-01 RX ORDER — HYDROMORPHONE HYDROCHLORIDE 1 MG/ML
0.5 INJECTION, SOLUTION INTRAMUSCULAR; INTRAVENOUS; SUBCUTANEOUS EVERY 4 HOURS PRN
Status: DISCONTINUED | OUTPATIENT
Start: 2020-01-01 | End: 2020-01-01

## 2020-01-01 RX ORDER — CEFAZOLIN SODIUM 1 G/3ML
2 INJECTION, POWDER, FOR SOLUTION INTRAMUSCULAR; INTRAVENOUS
Status: COMPLETED | OUTPATIENT
Start: 2020-01-01 | End: 2020-01-01

## 2020-01-01 RX ORDER — HYDROCODONE BITARTRATE AND ACETAMINOPHEN 7.5; 325 MG/1; MG/1
1 TABLET ORAL EVERY 8 HOURS PRN
Qty: 90 TABLET | Refills: 0 | Status: SHIPPED | OUTPATIENT
Start: 2020-01-01 | End: 2020-01-01

## 2020-01-01 RX ORDER — PROPOFOL 10 MG/ML
VIAL (ML) INTRAVENOUS CONTINUOUS PRN
Status: DISCONTINUED | OUTPATIENT
Start: 2020-01-01 | End: 2020-01-01

## 2020-01-01 RX ORDER — ONDANSETRON 2 MG/ML
8 INJECTION INTRAMUSCULAR; INTRAVENOUS EVERY 8 HOURS PRN
Status: DISCONTINUED | OUTPATIENT
Start: 2020-01-01 | End: 2020-01-01 | Stop reason: HOSPADM

## 2020-01-01 RX ORDER — TIZANIDINE 2 MG/1
2 TABLET ORAL EVERY 8 HOURS
Status: DISCONTINUED | OUTPATIENT
Start: 2020-01-01 | End: 2020-01-01 | Stop reason: HOSPADM

## 2020-01-01 RX ORDER — SUCCINYLCHOLINE CHLORIDE 20 MG/ML
INJECTION INTRAMUSCULAR; INTRAVENOUS
Status: DISCONTINUED | OUTPATIENT
Start: 2020-01-01 | End: 2020-01-01

## 2020-01-01 RX ORDER — HYDROMORPHONE HYDROCHLORIDE 2 MG/1
2 TABLET ORAL
Status: DISCONTINUED | OUTPATIENT
Start: 2020-01-01 | End: 2020-01-01

## 2020-01-01 RX ORDER — HYDROCODONE BITARTRATE AND ACETAMINOPHEN 10; 325 MG/1; MG/1
1 TABLET ORAL EVERY 8 HOURS PRN
Qty: 90 TABLET | Refills: 0 | Status: SHIPPED | OUTPATIENT
Start: 2020-01-01

## 2020-01-01 RX ORDER — TERBINAFINE HYDROCHLORIDE 250 MG/1
250 TABLET ORAL DAILY
Status: ON HOLD | COMMUNITY
End: 2020-01-01 | Stop reason: HOSPADM

## 2020-01-01 RX ORDER — TAMSULOSIN HYDROCHLORIDE 0.4 MG/1
0.4 CAPSULE ORAL DAILY
Status: DISCONTINUED | OUTPATIENT
Start: 2020-01-01 | End: 2020-01-01 | Stop reason: HOSPADM

## 2020-01-01 RX ORDER — METHOCARBAMOL 500 MG/1
1000 TABLET, FILM COATED ORAL 3 TIMES DAILY
Qty: 30 TABLET | Refills: 0 | Status: ON HOLD | OUTPATIENT
Start: 2020-01-01 | End: 2020-01-01 | Stop reason: HOSPADM

## 2020-01-01 RX ORDER — GABAPENTIN 400 MG/1
400 CAPSULE ORAL 3 TIMES DAILY
Status: DISCONTINUED | OUTPATIENT
Start: 2020-01-01 | End: 2020-01-01

## 2020-01-01 RX ORDER — FINASTERIDE 5 MG/1
5 TABLET, FILM COATED ORAL DAILY
Status: DISCONTINUED | OUTPATIENT
Start: 2020-01-01 | End: 2020-01-01 | Stop reason: HOSPADM

## 2020-01-01 RX ORDER — TAMSULOSIN HYDROCHLORIDE 0.4 MG/1
0.4 CAPSULE ORAL DAILY
COMMUNITY

## 2020-01-01 RX ORDER — METOPROLOL TARTRATE 1 MG/ML
INJECTION, SOLUTION INTRAVENOUS
Status: DISCONTINUED | OUTPATIENT
Start: 2020-01-01 | End: 2020-01-01

## 2020-01-01 RX ORDER — HYDROCODONE BITARTRATE AND ACETAMINOPHEN 10; 325 MG/1; MG/1
1 TABLET ORAL EVERY 4 HOURS PRN
Status: DISCONTINUED | OUTPATIENT
Start: 2020-01-01 | End: 2020-01-01

## 2020-01-01 RX ORDER — DEXAMETHASONE SODIUM PHOSPHATE 4 MG/ML
4 INJECTION, SOLUTION INTRA-ARTICULAR; INTRALESIONAL; INTRAMUSCULAR; INTRAVENOUS; SOFT TISSUE EVERY 6 HOURS
Status: DISCONTINUED | OUTPATIENT
Start: 2020-01-01 | End: 2020-01-01

## 2020-01-01 RX ORDER — HYDROMORPHONE HYDROCHLORIDE 1 MG/ML
1 INJECTION, SOLUTION INTRAMUSCULAR; INTRAVENOUS; SUBCUTANEOUS ONCE
Status: COMPLETED | OUTPATIENT
Start: 2020-01-01 | End: 2020-01-01

## 2020-01-01 RX ORDER — LORAZEPAM 2 MG/ML
INJECTION INTRAMUSCULAR
Status: DISPENSED
Start: 2020-01-01 | End: 2020-01-01

## 2020-01-01 RX ORDER — GABAPENTIN 300 MG/1
600 CAPSULE ORAL 2 TIMES DAILY
Status: DISCONTINUED | OUTPATIENT
Start: 2020-01-01 | End: 2020-01-01

## 2020-01-01 RX ORDER — OXYCODONE HYDROCHLORIDE 10 MG/1
10 TABLET ORAL EVERY 4 HOURS PRN
Status: DISCONTINUED | OUTPATIENT
Start: 2020-01-01 | End: 2020-01-01

## 2020-01-01 RX ORDER — LIDOCAINE HCL/PF 100 MG/5ML
SYRINGE (ML) INTRAVENOUS
Status: DISCONTINUED | OUTPATIENT
Start: 2020-01-01 | End: 2020-01-01

## 2020-01-01 RX ORDER — PREDNISONE 20 MG/1
40 TABLET ORAL DAILY
Status: ON HOLD | COMMUNITY
Start: 2020-01-01 | End: 2020-01-01 | Stop reason: HOSPADM

## 2020-01-01 RX ORDER — HYDROMORPHONE HYDROCHLORIDE 1 MG/ML
0.2 INJECTION, SOLUTION INTRAMUSCULAR; INTRAVENOUS; SUBCUTANEOUS EVERY 6 HOURS PRN
Status: DISCONTINUED | OUTPATIENT
Start: 2020-01-01 | End: 2020-01-01

## 2020-01-01 RX ORDER — MORPHINE SULFATE 2 MG/ML
1 INJECTION, SOLUTION INTRAMUSCULAR; INTRAVENOUS
Status: DISCONTINUED | OUTPATIENT
Start: 2020-01-01 | End: 2020-01-01

## 2020-01-01 RX ORDER — INSULIN ASPART 100 [IU]/ML
0-5 INJECTION, SOLUTION INTRAVENOUS; SUBCUTANEOUS
Status: DISCONTINUED | OUTPATIENT
Start: 2020-01-01 | End: 2020-01-01 | Stop reason: HOSPADM

## 2020-01-01 RX ORDER — HYDROCODONE BITARTRATE AND ACETAMINOPHEN 10; 325 MG/1; MG/1
1 TABLET ORAL EVERY 8 HOURS PRN
Qty: 90 TABLET | Refills: 0 | Status: SHIPPED | OUTPATIENT
Start: 2020-01-01 | End: 2020-01-01 | Stop reason: SDUPTHER

## 2020-01-01 RX ORDER — HYDROMORPHONE HYDROCHLORIDE 2 MG/1
4 TABLET ORAL
Status: DISCONTINUED | OUTPATIENT
Start: 2020-01-01 | End: 2020-01-01

## 2020-01-01 RX ORDER — PHENYLEPHRINE HYDROCHLORIDE 10 MG/ML
INJECTION INTRAVENOUS
Status: DISCONTINUED | OUTPATIENT
Start: 2020-01-01 | End: 2020-01-01

## 2020-01-01 RX ORDER — OXYCODONE HYDROCHLORIDE 5 MG/1
5 TABLET ORAL EVERY 6 HOURS PRN
Status: DISCONTINUED | OUTPATIENT
Start: 2020-01-01 | End: 2020-01-01

## 2020-01-01 RX ORDER — PANTOPRAZOLE SODIUM 40 MG/1
40 TABLET, DELAYED RELEASE ORAL DAILY
Status: DISCONTINUED | OUTPATIENT
Start: 2020-01-01 | End: 2020-01-01 | Stop reason: HOSPADM

## 2020-01-01 RX ORDER — METHOCARBAMOL 750 MG/1
750 TABLET, FILM COATED ORAL 4 TIMES DAILY
Status: DISCONTINUED | OUTPATIENT
Start: 2020-01-01 | End: 2020-01-01

## 2020-01-01 RX ORDER — AMOXICILLIN 250 MG
1 CAPSULE ORAL 2 TIMES DAILY
Status: DISCONTINUED | OUTPATIENT
Start: 2020-01-01 | End: 2020-01-01

## 2020-01-01 RX ORDER — HYDROMORPHONE HYDROCHLORIDE 1 MG/ML
1 INJECTION, SOLUTION INTRAMUSCULAR; INTRAVENOUS; SUBCUTANEOUS EVERY 4 HOURS PRN
Status: DISCONTINUED | OUTPATIENT
Start: 2020-01-01 | End: 2020-01-01 | Stop reason: HOSPADM

## 2020-01-01 RX ORDER — ONDANSETRON 2 MG/ML
4 INJECTION INTRAMUSCULAR; INTRAVENOUS DAILY PRN
Status: DISCONTINUED | OUTPATIENT
Start: 2020-01-01 | End: 2020-01-01 | Stop reason: HOSPADM

## 2020-01-01 RX ORDER — LORAZEPAM 2 MG/ML
0.5 INJECTION INTRAMUSCULAR ONCE
Status: DISCONTINUED | OUTPATIENT
Start: 2020-01-01 | End: 2020-01-01 | Stop reason: HOSPADM

## 2020-01-01 RX ORDER — ACETAMINOPHEN 10 MG/ML
INJECTION, SOLUTION INTRAVENOUS
Status: DISCONTINUED | OUTPATIENT
Start: 2020-01-01 | End: 2020-01-01

## 2020-01-01 RX ORDER — PREGABALIN 150 MG/1
150 CAPSULE ORAL 2 TIMES DAILY
Status: DISCONTINUED | OUTPATIENT
Start: 2020-01-01 | End: 2020-01-01 | Stop reason: HOSPADM

## 2020-01-01 RX ORDER — IBUPROFEN 600 MG/1
600 TABLET ORAL EVERY 6 HOURS PRN
Qty: 20 TABLET | Refills: 0 | Status: ON HOLD | OUTPATIENT
Start: 2020-01-01 | End: 2020-01-01 | Stop reason: HOSPADM

## 2020-01-01 RX ORDER — BACITRACIN 500 [USP'U]/G
OINTMENT TOPICAL 2 TIMES DAILY
Status: DISCONTINUED | OUTPATIENT
Start: 2020-01-01 | End: 2020-01-01 | Stop reason: HOSPADM

## 2020-01-01 RX ORDER — AMLODIPINE BESYLATE 5 MG/1
5 TABLET ORAL DAILY
Status: DISCONTINUED | OUTPATIENT
Start: 2020-01-01 | End: 2020-01-01

## 2020-01-01 RX ORDER — HYDROCODONE BITARTRATE AND ACETAMINOPHEN 10; 325 MG/1; MG/1
1 TABLET ORAL EVERY 8 HOURS PRN
Qty: 90 TABLET | Refills: 0 | Status: ON HOLD | OUTPATIENT
Start: 2020-01-01 | End: 2020-01-01 | Stop reason: HOSPADM

## 2020-01-01 RX ORDER — GABAPENTIN 300 MG/1
300 CAPSULE ORAL 2 TIMES DAILY
Status: DISCONTINUED | OUTPATIENT
Start: 2020-01-01 | End: 2020-01-01

## 2020-01-01 RX ORDER — POLYETHYLENE GLYCOL 3350 17 G/17G
17 POWDER, FOR SOLUTION ORAL DAILY
Status: DISCONTINUED | OUTPATIENT
Start: 2020-01-01 | End: 2020-01-01

## 2020-01-01 RX ORDER — GADOBUTROL 604.72 MG/ML
7 INJECTION INTRAVENOUS
Status: COMPLETED | OUTPATIENT
Start: 2020-01-01 | End: 2020-01-01

## 2020-01-01 RX ORDER — AMOXICILLIN 250 MG
1 CAPSULE ORAL DAILY PRN
Status: DISCONTINUED | OUTPATIENT
Start: 2020-01-01 | End: 2020-01-01 | Stop reason: HOSPADM

## 2020-01-01 RX ORDER — KETOROLAC TROMETHAMINE 30 MG/ML
30 INJECTION, SOLUTION INTRAMUSCULAR; INTRAVENOUS
Status: COMPLETED | OUTPATIENT
Start: 2020-01-01 | End: 2020-01-01

## 2020-01-01 RX ORDER — METHOCARBAMOL 750 MG/1
750 TABLET, FILM COATED ORAL 4 TIMES DAILY PRN
Status: DISCONTINUED | OUTPATIENT
Start: 2020-01-01 | End: 2020-01-01

## 2020-01-01 RX ORDER — ACETAMINOPHEN 325 MG/1
650 TABLET ORAL EVERY 6 HOURS PRN
Status: DISCONTINUED | OUTPATIENT
Start: 2020-01-01 | End: 2020-01-01

## 2020-01-01 RX ORDER — MAG HYDROX/ALUMINUM HYD/SIMETH 200-200-20
30 SUSPENSION, ORAL (FINAL DOSE FORM) ORAL EVERY 4 HOURS PRN
Status: DISCONTINUED | OUTPATIENT
Start: 2020-01-01 | End: 2020-01-01 | Stop reason: HOSPADM

## 2020-01-01 RX ORDER — ACETAMINOPHEN 325 MG/1
650 TABLET ORAL EVERY 4 HOURS PRN
Status: DISCONTINUED | OUTPATIENT
Start: 2020-01-01 | End: 2020-01-01

## 2020-01-01 RX ORDER — DEXAMETHASONE 4 MG/1
4 TABLET ORAL EVERY 6 HOURS
Status: DISCONTINUED | OUTPATIENT
Start: 2020-01-01 | End: 2020-01-01 | Stop reason: HOSPADM

## 2020-01-01 RX ORDER — ONDANSETRON 2 MG/ML
INJECTION INTRAMUSCULAR; INTRAVENOUS
Status: DISCONTINUED | OUTPATIENT
Start: 2020-01-01 | End: 2020-01-01

## 2020-01-01 RX ORDER — TIZANIDINE 2 MG/1
2 TABLET ORAL EVERY 8 HOURS
Qty: 30 TABLET | Refills: 0
Start: 2020-01-01 | End: 2020-01-01

## 2020-01-01 RX ORDER — FINASTERIDE 5 MG/1
TABLET, FILM COATED ORAL
Qty: 30 TABLET | Refills: 11 | Status: SHIPPED | OUTPATIENT
Start: 2020-01-01

## 2020-01-01 RX ORDER — DEXAMETHASONE 4 MG/1
4 TABLET ORAL EVERY 6 HOURS
Qty: 4 TABLET | Refills: 0
Start: 2020-01-01 | End: 2020-01-01

## 2020-01-01 RX ORDER — FINASTERIDE 5 MG/1
5 TABLET, FILM COATED ORAL DAILY
COMMUNITY
End: 2020-01-01

## 2020-01-01 RX ORDER — DEXAMETHASONE SODIUM PHOSPHATE 4 MG/ML
INJECTION, SOLUTION INTRA-ARTICULAR; INTRALESIONAL; INTRAMUSCULAR; INTRAVENOUS; SOFT TISSUE
Status: DISCONTINUED | OUTPATIENT
Start: 2020-01-01 | End: 2020-01-01

## 2020-01-01 RX ORDER — MIDAZOLAM HYDROCHLORIDE 1 MG/ML
INJECTION, SOLUTION INTRAMUSCULAR; INTRAVENOUS
Status: DISCONTINUED | OUTPATIENT
Start: 2020-01-01 | End: 2020-01-01

## 2020-01-01 RX ORDER — SODIUM CHLORIDE 9 MG/ML
1000 INJECTION, SOLUTION INTRAVENOUS
Status: COMPLETED | OUTPATIENT
Start: 2020-01-01 | End: 2020-01-01

## 2020-01-01 RX ORDER — HEPARIN SODIUM 5000 [USP'U]/ML
5000 INJECTION, SOLUTION INTRAVENOUS; SUBCUTANEOUS EVERY 8 HOURS
Status: DISCONTINUED | OUTPATIENT
Start: 2020-01-01 | End: 2020-01-01 | Stop reason: HOSPADM

## 2020-01-01 RX ORDER — IBUPROFEN 200 MG
16 TABLET ORAL
Status: DISCONTINUED | OUTPATIENT
Start: 2020-01-01 | End: 2020-01-01 | Stop reason: HOSPADM

## 2020-01-01 RX ORDER — ALBUTEROL SULFATE 2.5 MG/.5ML
2.5 SOLUTION RESPIRATORY (INHALATION) EVERY 4 HOURS PRN
Status: DISCONTINUED | OUTPATIENT
Start: 2020-01-01 | End: 2020-01-01 | Stop reason: HOSPADM

## 2020-01-01 RX ORDER — POLYETHYLENE GLYCOL 3350 17 G/17G
17 POWDER, FOR SOLUTION ORAL 2 TIMES DAILY PRN
Status: DISCONTINUED | OUTPATIENT
Start: 2020-01-01 | End: 2020-01-01 | Stop reason: HOSPADM

## 2020-01-01 RX ORDER — PROPOFOL 10 MG/ML
VIAL (ML) INTRAVENOUS
Status: DISCONTINUED | OUTPATIENT
Start: 2020-01-01 | End: 2020-01-01

## 2020-01-01 RX ORDER — METHOCARBAMOL 500 MG/1
1000 TABLET, FILM COATED ORAL 4 TIMES DAILY
Status: DISCONTINUED | OUTPATIENT
Start: 2020-01-01 | End: 2020-01-01 | Stop reason: HOSPADM

## 2020-01-01 RX ORDER — SODIUM CHLORIDE 9 MG/ML
INJECTION, SOLUTION INTRAVENOUS CONTINUOUS PRN
Status: DISCONTINUED | OUTPATIENT
Start: 2020-01-01 | End: 2020-01-01

## 2020-01-01 RX ORDER — HYDROMORPHONE HYDROCHLORIDE 1 MG/ML
0.2 INJECTION, SOLUTION INTRAMUSCULAR; INTRAVENOUS; SUBCUTANEOUS EVERY 5 MIN PRN
Status: COMPLETED | OUTPATIENT
Start: 2020-01-01 | End: 2020-01-01

## 2020-01-01 RX ORDER — ESMOLOL HYDROCHLORIDE 10 MG/ML
INJECTION INTRAVENOUS
Status: DISCONTINUED | OUTPATIENT
Start: 2020-01-01 | End: 2020-01-01

## 2020-01-01 RX ORDER — KETAMINE HCL IN 0.9 % NACL 50 MG/5 ML
SYRINGE (ML) INTRAVENOUS
Status: DISCONTINUED | OUTPATIENT
Start: 2020-01-01 | End: 2020-01-01

## 2020-01-01 RX ORDER — AMOXICILLIN 250 MG
2 CAPSULE ORAL NIGHTLY PRN
Status: DISCONTINUED | OUTPATIENT
Start: 2020-01-01 | End: 2020-01-01

## 2020-01-01 RX ORDER — INSULIN ASPART 100 [IU]/ML
0-5 INJECTION, SOLUTION INTRAVENOUS; SUBCUTANEOUS
Refills: 0
Start: 2020-01-01 | End: 2021-01-19

## 2020-01-01 RX ORDER — LORAZEPAM 2 MG/ML
0.5 INJECTION INTRAMUSCULAR
Status: COMPLETED | OUTPATIENT
Start: 2020-01-01 | End: 2020-01-01

## 2020-01-01 RX ORDER — HYDROCODONE BITARTRATE AND ACETAMINOPHEN 10; 325 MG/1; MG/1
1 TABLET ORAL EVERY 8 HOURS PRN
Status: DISCONTINUED | OUTPATIENT
Start: 2020-01-01 | End: 2020-01-01

## 2020-01-01 RX ORDER — ACETAMINOPHEN 500 MG
1000 TABLET ORAL EVERY 6 HOURS
Status: DISCONTINUED | OUTPATIENT
Start: 2020-01-01 | End: 2020-01-01

## 2020-01-01 RX ORDER — ACETAMINOPHEN 500 MG
1000 TABLET ORAL EVERY 8 HOURS
Status: DISCONTINUED | OUTPATIENT
Start: 2020-01-01 | End: 2020-01-01

## 2020-01-01 RX ORDER — LIDOCAINE 50 MG/G
2 PATCH TOPICAL
Status: DISCONTINUED | OUTPATIENT
Start: 2020-01-01 | End: 2020-01-01 | Stop reason: HOSPADM

## 2020-01-01 RX ORDER — AMLODIPINE BESYLATE 5 MG/1
5 TABLET ORAL DAILY
Qty: 30 TABLET | Refills: 11
Start: 2020-01-01 | End: 2021-01-20

## 2020-01-01 RX ORDER — OXYCODONE HYDROCHLORIDE 5 MG/1
5 TABLET ORAL EVERY 4 HOURS PRN
Refills: 0
Start: 2020-01-01 | End: 2020-01-01

## 2020-01-01 RX ORDER — SODIUM CHLORIDE 0.9 % (FLUSH) 0.9 %
10 SYRINGE (ML) INJECTION
Status: DISCONTINUED | OUTPATIENT
Start: 2020-01-01 | End: 2020-01-01 | Stop reason: HOSPADM

## 2020-01-01 RX ORDER — LIDOCAINE HYDROCHLORIDE 10 MG/ML
INJECTION, SOLUTION EPIDURAL; INFILTRATION; INTRACAUDAL; PERINEURAL
Status: DISCONTINUED | OUTPATIENT
Start: 2020-01-01 | End: 2020-01-01 | Stop reason: HOSPADM

## 2020-01-01 RX ORDER — DIAZEPAM 5 MG/1
5 TABLET ORAL EVERY 6 HOURS PRN
Status: DISCONTINUED | OUTPATIENT
Start: 2020-01-01 | End: 2020-01-01

## 2020-01-01 RX ORDER — BACITRACIN 50000 [IU]/1
INJECTION, POWDER, FOR SOLUTION INTRAMUSCULAR
Status: DISCONTINUED | OUTPATIENT
Start: 2020-01-01 | End: 2020-01-01 | Stop reason: HOSPADM

## 2020-01-01 RX ORDER — AMLODIPINE BESYLATE 5 MG/1
5 TABLET ORAL DAILY
Status: DISCONTINUED | OUTPATIENT
Start: 2020-01-01 | End: 2020-01-01 | Stop reason: HOSPADM

## 2020-01-01 RX ORDER — DIAZEPAM 5 MG/1
5 TABLET ORAL ONCE
Status: DISCONTINUED | OUTPATIENT
Start: 2020-01-01 | End: 2020-01-01

## 2020-01-01 RX ORDER — HYDROCODONE BITARTRATE AND ACETAMINOPHEN 5; 325 MG/1; MG/1
1 TABLET ORAL EVERY 4 HOURS PRN
Status: DISCONTINUED | OUTPATIENT
Start: 2020-01-01 | End: 2020-01-01

## 2020-01-01 RX ORDER — PREGABALIN 150 MG/1
150 CAPSULE ORAL 2 TIMES DAILY
Qty: 60 CAPSULE | Refills: 6
Start: 2020-01-01 | End: 2020-07-20

## 2020-01-01 RX ORDER — BISACODYL 10 MG
10 SUPPOSITORY, RECTAL RECTAL DAILY
Status: DISCONTINUED | OUTPATIENT
Start: 2020-01-01 | End: 2020-01-01

## 2020-01-01 RX ORDER — HYDROMORPHONE HYDROCHLORIDE 1 MG/ML
0.2 INJECTION, SOLUTION INTRAMUSCULAR; INTRAVENOUS; SUBCUTANEOUS
Status: DISCONTINUED | OUTPATIENT
Start: 2020-01-01 | End: 2020-01-01

## 2020-01-01 RX ORDER — PANTOPRAZOLE SODIUM 40 MG/1
40 TABLET, DELAYED RELEASE ORAL DAILY
Qty: 30 TABLET | Refills: 11
Start: 2020-01-01 | End: 2021-01-20

## 2020-01-01 RX ORDER — LORAZEPAM 2 MG/ML
0.5 INJECTION INTRAMUSCULAR ONCE
Status: COMPLETED | OUTPATIENT
Start: 2020-01-01 | End: 2020-01-01

## 2020-01-01 RX ORDER — HYDROMORPHONE HYDROCHLORIDE 1 MG/ML
1 INJECTION, SOLUTION INTRAMUSCULAR; INTRAVENOUS; SUBCUTANEOUS
Status: DISCONTINUED | OUTPATIENT
Start: 2020-01-01 | End: 2020-01-01

## 2020-01-01 RX ORDER — MORPHINE SULFATE 2 MG/ML
4 INJECTION, SOLUTION INTRAMUSCULAR; INTRAVENOUS
Status: DISCONTINUED | OUTPATIENT
Start: 2020-01-01 | End: 2020-01-01

## 2020-01-01 RX ORDER — HYDROMORPHONE HYDROCHLORIDE 1 MG/ML
0.5 INJECTION, SOLUTION INTRAMUSCULAR; INTRAVENOUS; SUBCUTANEOUS
Status: DISCONTINUED | OUTPATIENT
Start: 2020-01-01 | End: 2020-01-01

## 2020-01-01 RX ORDER — METHOCARBAMOL 500 MG/1
1000 TABLET, FILM COATED ORAL 3 TIMES DAILY
Status: DISCONTINUED | OUTPATIENT
Start: 2020-01-01 | End: 2020-01-01

## 2020-01-01 RX ORDER — HYDROMORPHONE HYDROCHLORIDE 1 MG/ML
0.5 INJECTION, SOLUTION INTRAMUSCULAR; INTRAVENOUS; SUBCUTANEOUS
Status: COMPLETED | OUTPATIENT
Start: 2020-01-01 | End: 2020-01-01

## 2020-01-01 RX ORDER — ALBUMIN HUMAN 50 G/1000ML
SOLUTION INTRAVENOUS CONTINUOUS PRN
Status: DISCONTINUED | OUTPATIENT
Start: 2020-01-01 | End: 2020-01-01

## 2020-01-01 RX ORDER — ONDANSETRON 8 MG/1
8 TABLET, ORALLY DISINTEGRATING ORAL EVERY 6 HOURS PRN
Status: DISCONTINUED | OUTPATIENT
Start: 2020-01-01 | End: 2020-01-01

## 2020-01-01 RX ORDER — POLYETHYLENE GLYCOL 3350 17 G/17G
17 POWDER, FOR SOLUTION ORAL DAILY
Status: DISCONTINUED | OUTPATIENT
Start: 2020-01-01 | End: 2020-01-01 | Stop reason: HOSPADM

## 2020-01-01 RX ORDER — SODIUM CHLORIDE 9 MG/ML
INJECTION, SOLUTION INTRAVENOUS CONTINUOUS
Status: DISCONTINUED | OUTPATIENT
Start: 2020-01-01 | End: 2020-01-01 | Stop reason: HOSPADM

## 2020-01-01 RX ADMIN — SUCCINYLCHOLINE CHLORIDE 14 MG: 20 INJECTION, SOLUTION INTRAMUSCULAR; INTRAVENOUS at 03:01

## 2020-01-01 RX ADMIN — HYDROMORPHONE HYDROCHLORIDE 0.2 MG: 1 INJECTION, SOLUTION INTRAMUSCULAR; INTRAVENOUS; SUBCUTANEOUS at 08:01

## 2020-01-01 RX ADMIN — ACETAMINOPHEN 1000 MG: 500 TABLET ORAL at 05:01

## 2020-01-01 RX ADMIN — DEXAMETHASONE SODIUM PHOSPHATE 4 MG: 4 INJECTION, SOLUTION INTRAMUSCULAR; INTRAVENOUS at 11:01

## 2020-01-01 RX ADMIN — DEXAMETHASONE 4 MG: 4 TABLET ORAL at 01:01

## 2020-01-01 RX ADMIN — POLYETHYLENE GLYCOL 3350 17 G: 17 POWDER, FOR SOLUTION ORAL at 09:01

## 2020-01-01 RX ADMIN — LIDOCAINE 2 PATCH: 50 PATCH TOPICAL at 08:01

## 2020-01-01 RX ADMIN — PANTOPRAZOLE SODIUM 40 MG: 40 TABLET, DELAYED RELEASE ORAL at 08:01

## 2020-01-01 RX ADMIN — PHENYLEPHRINE HYDROCHLORIDE 50 MCG: 10 INJECTION INTRAVENOUS at 05:01

## 2020-01-01 RX ADMIN — FENTANYL CITRATE 50 MCG: 50 INJECTION, SOLUTION INTRAMUSCULAR; INTRAVENOUS at 07:01

## 2020-01-01 RX ADMIN — EPHEDRINE SULFATE 20 MG: 50 INJECTION, SOLUTION INTRAMUSCULAR; INTRAVENOUS; SUBCUTANEOUS at 03:01

## 2020-01-01 RX ADMIN — HYDROMORPHONE HYDROCHLORIDE 2 MG: 2 TABLET ORAL at 09:01

## 2020-01-01 RX ADMIN — METHOCARBAMOL TABLETS 1000 MG: 500 TABLET, COATED ORAL at 02:01

## 2020-01-01 RX ADMIN — GABAPENTIN 600 MG: 300 CAPSULE ORAL at 08:01

## 2020-01-01 RX ADMIN — TAMSULOSIN HYDROCHLORIDE 0.4 MG: 0.4 CAPSULE ORAL at 09:01

## 2020-01-01 RX ADMIN — HEPARIN SODIUM 5000 UNITS: 5000 INJECTION, SOLUTION INTRAVENOUS; SUBCUTANEOUS at 02:01

## 2020-01-01 RX ADMIN — MUPIROCIN: 20 OINTMENT TOPICAL at 09:01

## 2020-01-01 RX ADMIN — HYDROCODONE BITARTRATE AND ACETAMINOPHEN 1 TABLET: 10; 325 TABLET ORAL at 06:01

## 2020-01-01 RX ADMIN — HYDROMORPHONE HYDROCHLORIDE 0.2 MG: 1 INJECTION, SOLUTION INTRAMUSCULAR; INTRAVENOUS; SUBCUTANEOUS at 09:01

## 2020-01-01 RX ADMIN — LORAZEPAM 0.5 MG: 2 INJECTION INTRAMUSCULAR; INTRAVENOUS at 05:01

## 2020-01-01 RX ADMIN — HYDROMORPHONE HYDROCHLORIDE 0.2 MG: 1 INJECTION, SOLUTION INTRAMUSCULAR; INTRAVENOUS; SUBCUTANEOUS at 05:01

## 2020-01-01 RX ADMIN — BACITRACIN: 500 OINTMENT TOPICAL at 08:01

## 2020-01-01 RX ADMIN — POLYETHYLENE GLYCOL 3350 17 G: 17 POWDER, FOR SOLUTION ORAL at 08:01

## 2020-01-01 RX ADMIN — HYDROMORPHONE HYDROCHLORIDE 0.5 MG: 1 INJECTION, SOLUTION INTRAMUSCULAR; INTRAVENOUS; SUBCUTANEOUS at 10:01

## 2020-01-01 RX ADMIN — PANTOPRAZOLE SODIUM 40 MG: 40 TABLET, DELAYED RELEASE ORAL at 09:01

## 2020-01-01 RX ADMIN — HEPARIN SODIUM 5000 UNITS: 5000 INJECTION, SOLUTION INTRAVENOUS; SUBCUTANEOUS at 05:01

## 2020-01-01 RX ADMIN — FINASTERIDE 5 MG: 5 TABLET, FILM COATED ORAL at 08:01

## 2020-01-01 RX ADMIN — HYDROMORPHONE HYDROCHLORIDE 1 MG: 1 INJECTION, SOLUTION INTRAMUSCULAR; INTRAVENOUS; SUBCUTANEOUS at 04:01

## 2020-01-01 RX ADMIN — DEXAMETHASONE 4 MG: 4 TABLET ORAL at 05:01

## 2020-01-01 RX ADMIN — CEFAZOLIN 2 G: 1 INJECTION, POWDER, FOR SOLUTION INTRAMUSCULAR; INTRAVENOUS at 08:01

## 2020-01-01 RX ADMIN — AMLODIPINE BESYLATE 5 MG: 5 TABLET ORAL at 08:01

## 2020-01-01 RX ADMIN — HYDROMORPHONE HYDROCHLORIDE 1 MG: 1 INJECTION, SOLUTION INTRAMUSCULAR; INTRAVENOUS; SUBCUTANEOUS at 09:01

## 2020-01-01 RX ADMIN — METHYLPREDNISOLONE SODIUM SUCCINATE 40 MG: 40 INJECTION, POWDER, FOR SOLUTION INTRAMUSCULAR; INTRAVENOUS at 11:01

## 2020-01-01 RX ADMIN — DEXAMETHASONE 4 MG: 4 TABLET ORAL at 06:01

## 2020-01-01 RX ADMIN — HYDROMORPHONE HYDROCHLORIDE 0.5 MG: 1 INJECTION, SOLUTION INTRAMUSCULAR; INTRAVENOUS; SUBCUTANEOUS at 04:01

## 2020-01-01 RX ADMIN — METHOCARBAMOL TABLETS 1000 MG: 500 TABLET, COATED ORAL at 12:01

## 2020-01-01 RX ADMIN — PHENYLEPHRINE HYDROCHLORIDE 200 MCG: 10 INJECTION INTRAVENOUS at 06:01

## 2020-01-01 RX ADMIN — LIDOCAINE 2 PATCH: 50 PATCH TOPICAL at 09:01

## 2020-01-01 RX ADMIN — HYDROMORPHONE HYDROCHLORIDE 0.5 MG: 1 INJECTION, SOLUTION INTRAMUSCULAR; INTRAVENOUS; SUBCUTANEOUS at 12:01

## 2020-01-01 RX ADMIN — GABAPENTIN 300 MG: 300 CAPSULE ORAL at 09:01

## 2020-01-01 RX ADMIN — BACITRACIN: 500 OINTMENT TOPICAL at 09:01

## 2020-01-01 RX ADMIN — DEXAMETHASONE 4 MG: 4 TABLET ORAL at 02:01

## 2020-01-01 RX ADMIN — TIZANIDINE 2 MG: 2 TABLET ORAL at 05:01

## 2020-01-01 RX ADMIN — GABAPENTIN 300 MG: 300 CAPSULE ORAL at 11:01

## 2020-01-01 RX ADMIN — ESMOLOL HYDROCHLORIDE 10 MG: 10 INJECTION INTRAVENOUS at 05:01

## 2020-01-01 RX ADMIN — ACETAMINOPHEN 1000 MG: 500 TABLET ORAL at 11:01

## 2020-01-01 RX ADMIN — HYDROCODONE BITARTRATE AND ACETAMINOPHEN 1 TABLET: 10; 325 TABLET ORAL at 10:01

## 2020-01-01 RX ADMIN — PHENYLEPHRINE HYDROCHLORIDE 100 MCG: 10 INJECTION INTRAVENOUS at 05:01

## 2020-01-01 RX ADMIN — FINASTERIDE 5 MG: 5 TABLET, FILM COATED ORAL at 09:01

## 2020-01-01 RX ADMIN — SODIUM CHLORIDE: 0.9 INJECTION, SOLUTION INTRAVENOUS at 09:01

## 2020-01-01 RX ADMIN — ACETAMINOPHEN 1000 MG: 500 TABLET ORAL at 08:01

## 2020-01-01 RX ADMIN — GABAPENTIN 300 MG: 300 CAPSULE ORAL at 08:01

## 2020-01-01 RX ADMIN — ALBUMIN (HUMAN): 12.5 SOLUTION INTRAVENOUS at 06:01

## 2020-01-01 RX ADMIN — ACETAMINOPHEN 1000 MG: 500 TABLET ORAL at 06:01

## 2020-01-01 RX ADMIN — HYDROCODONE BITARTRATE AND ACETAMINOPHEN 1 TABLET: 10; 325 TABLET ORAL at 07:01

## 2020-01-01 RX ADMIN — CEFAZOLIN 2 G: 1 INJECTION, POWDER, FOR SOLUTION INTRAMUSCULAR; INTRAVENOUS at 11:01

## 2020-01-01 RX ADMIN — GADOBUTROL 7 ML: 604.72 INJECTION INTRAVENOUS at 08:01

## 2020-01-01 RX ADMIN — HYDROMORPHONE HYDROCHLORIDE 4 MG: 2 TABLET ORAL at 11:01

## 2020-01-01 RX ADMIN — PROPOFOL 20 MG: 10 INJECTION, EMULSION INTRAVENOUS at 04:01

## 2020-01-01 RX ADMIN — DEXAMETHASONE 4 MG: 4 TABLET ORAL at 12:01

## 2020-01-01 RX ADMIN — GABAPENTIN 600 MG: 300 CAPSULE ORAL at 10:01

## 2020-01-01 RX ADMIN — PREGABALIN 150 MG: 150 CAPSULE ORAL at 08:01

## 2020-01-01 RX ADMIN — HYDROMORPHONE HYDROCHLORIDE 1 MG: 1 INJECTION, SOLUTION INTRAMUSCULAR; INTRAVENOUS; SUBCUTANEOUS at 06:01

## 2020-01-01 RX ADMIN — ACETAMINOPHEN 1000 MG: 500 TABLET ORAL at 12:01

## 2020-01-01 RX ADMIN — OXYCODONE HYDROCHLORIDE 10 MG: 10 TABLET ORAL at 06:01

## 2020-01-01 RX ADMIN — METHOCARBAMOL TABLETS 1000 MG: 500 TABLET, COATED ORAL at 09:01

## 2020-01-01 RX ADMIN — HYDROMORPHONE HYDROCHLORIDE 2 MG: 2 TABLET ORAL at 03:01

## 2020-01-01 RX ADMIN — HYDROMORPHONE HYDROCHLORIDE 1 MG: 1 INJECTION, SOLUTION INTRAMUSCULAR; INTRAVENOUS; SUBCUTANEOUS at 08:01

## 2020-01-01 RX ADMIN — KETOROLAC TROMETHAMINE 30 MG: 30 INJECTION, SOLUTION INTRAMUSCULAR; INTRAVENOUS at 10:01

## 2020-01-01 RX ADMIN — ONDANSETRON 4 MG: 2 INJECTION INTRAMUSCULAR; INTRAVENOUS at 12:01

## 2020-01-01 RX ADMIN — TIZANIDINE 2 MG: 2 TABLET ORAL at 06:01

## 2020-01-01 RX ADMIN — METOPROLOL TARTRATE 1 MG: 5 INJECTION, SOLUTION INTRAVENOUS at 05:01

## 2020-01-01 RX ADMIN — BISACODYL 10 MG: 10 SUPPOSITORY RECTAL at 08:01

## 2020-01-01 RX ADMIN — OXYCODONE HYDROCHLORIDE 10 MG: 10 TABLET ORAL at 02:01

## 2020-01-01 RX ADMIN — MUPIROCIN: 20 OINTMENT TOPICAL at 08:01

## 2020-01-01 RX ADMIN — SUGAMMADEX 200 MG: 100 INJECTION, SOLUTION INTRAVENOUS at 05:01

## 2020-01-01 RX ADMIN — HYDROMORPHONE HYDROCHLORIDE 0.2 MG: 1 INJECTION, SOLUTION INTRAMUSCULAR; INTRAVENOUS; SUBCUTANEOUS at 11:01

## 2020-01-01 RX ADMIN — PREGABALIN 150 MG: 150 CAPSULE ORAL at 09:01

## 2020-01-01 RX ADMIN — SENNOSIDES AND DOCUSATE SODIUM 1 TABLET: 8.6; 5 TABLET ORAL at 09:01

## 2020-01-01 RX ADMIN — TIZANIDINE 2 MG: 2 TABLET ORAL at 03:01

## 2020-01-01 RX ADMIN — ACETAMINOPHEN 1000 MG: 500 TABLET ORAL at 02:01

## 2020-01-01 RX ADMIN — SODIUM CHLORIDE: 0.9 INJECTION, SOLUTION INTRAVENOUS at 12:01

## 2020-01-01 RX ADMIN — TIZANIDINE 2 MG: 2 TABLET ORAL at 08:01

## 2020-01-01 RX ADMIN — SODIUM CHLORIDE: 0.9 INJECTION, SOLUTION INTRAVENOUS at 10:01

## 2020-01-01 RX ADMIN — ONDANSETRON 4 MG: 2 INJECTION INTRAMUSCULAR; INTRAVENOUS at 07:01

## 2020-01-01 RX ADMIN — TIZANIDINE 2 MG: 2 TABLET ORAL at 01:01

## 2020-01-01 RX ADMIN — MIDAZOLAM HYDROCHLORIDE 2 MG: 1 INJECTION, SOLUTION INTRAMUSCULAR; INTRAVENOUS at 02:01

## 2020-01-01 RX ADMIN — HYDROMORPHONE HYDROCHLORIDE 0.5 MG: 1 INJECTION, SOLUTION INTRAMUSCULAR; INTRAVENOUS; SUBCUTANEOUS at 03:01

## 2020-01-01 RX ADMIN — LIDOCAINE HYDROCHLORIDE 50 MG: 20 INJECTION, SOLUTION INTRAVENOUS at 02:01

## 2020-01-01 RX ADMIN — HYDROMORPHONE HYDROCHLORIDE 4 MG: 2 TABLET ORAL at 03:01

## 2020-01-01 RX ADMIN — HYDROMORPHONE HYDROCHLORIDE 0.2 MG: 1 INJECTION, SOLUTION INTRAMUSCULAR; INTRAVENOUS; SUBCUTANEOUS at 10:01

## 2020-01-01 RX ADMIN — HYDROMORPHONE HYDROCHLORIDE 2 MG: 2 TABLET ORAL at 06:01

## 2020-01-01 RX ADMIN — GABAPENTIN 600 MG: 300 CAPSULE ORAL at 09:01

## 2020-01-01 RX ADMIN — OXYCODONE HYDROCHLORIDE 10 MG: 10 TABLET ORAL at 10:01

## 2020-01-01 RX ADMIN — MORPHINE SULFATE 1 MG: 2 INJECTION, SOLUTION INTRAMUSCULAR; INTRAVENOUS at 08:01

## 2020-01-01 RX ADMIN — TIZANIDINE 2 MG: 2 TABLET ORAL at 09:01

## 2020-01-01 RX ADMIN — PROPOFOL 150 MCG/KG/MIN: 10 INJECTION, EMULSION INTRAVENOUS at 03:01

## 2020-01-01 RX ADMIN — METHOCARBAMOL TABLETS 750 MG: 750 TABLET, COATED ORAL at 09:01

## 2020-01-01 RX ADMIN — GABAPENTIN 300 MG: 300 CAPSULE ORAL at 03:01

## 2020-01-01 RX ADMIN — GABAPENTIN 400 MG: 400 CAPSULE ORAL at 09:01

## 2020-01-01 RX ADMIN — Medication 20 MG: at 04:01

## 2020-01-01 RX ADMIN — HYDROMORPHONE HYDROCHLORIDE 1 MG: 1 INJECTION, SOLUTION INTRAMUSCULAR; INTRAVENOUS; SUBCUTANEOUS at 02:01

## 2020-01-01 RX ADMIN — CEFAZOLIN 2 G: 330 INJECTION, POWDER, FOR SOLUTION INTRAMUSCULAR; INTRAVENOUS at 03:01

## 2020-01-01 RX ADMIN — SODIUM CHLORIDE: 0.9 INJECTION, SOLUTION INTRAVENOUS at 06:01

## 2020-01-01 RX ADMIN — HYDROMORPHONE HYDROCHLORIDE 4 MG: 2 TABLET ORAL at 08:01

## 2020-01-01 RX ADMIN — HYDROMORPHONE HYDROCHLORIDE 4 MG: 2 TABLET ORAL at 09:01

## 2020-01-01 RX ADMIN — Medication 10 MG: at 06:01

## 2020-01-01 RX ADMIN — AMLODIPINE BESYLATE 5 MG: 5 TABLET ORAL at 09:01

## 2020-01-01 RX ADMIN — METHOCARBAMOL TABLETS 1000 MG: 500 TABLET, COATED ORAL at 03:01

## 2020-01-01 RX ADMIN — Medication 10 MG: at 05:01

## 2020-01-01 RX ADMIN — HEPARIN SODIUM 5000 UNITS: 5000 INJECTION, SOLUTION INTRAVENOUS; SUBCUTANEOUS at 01:01

## 2020-01-01 RX ADMIN — AMLODIPINE BESYLATE 5 MG: 5 TABLET ORAL at 12:01

## 2020-01-01 RX ADMIN — HEPARIN SODIUM 5000 UNITS: 5000 INJECTION, SOLUTION INTRAVENOUS; SUBCUTANEOUS at 09:01

## 2020-01-01 RX ADMIN — HYDROMORPHONE HYDROCHLORIDE 0.2 MG: 1 INJECTION, SOLUTION INTRAMUSCULAR; INTRAVENOUS; SUBCUTANEOUS at 12:01

## 2020-01-01 RX ADMIN — OXYCODONE HYDROCHLORIDE 10 MG: 10 TABLET ORAL at 12:01

## 2020-01-01 RX ADMIN — HYDROMORPHONE HYDROCHLORIDE 1 MG: 1 INJECTION, SOLUTION INTRAMUSCULAR; INTRAVENOUS; SUBCUTANEOUS at 11:01

## 2020-01-01 RX ADMIN — DEXAMETHASONE SODIUM PHOSPHATE 10 MG: 4 INJECTION, SOLUTION INTRAMUSCULAR; INTRAVENOUS at 04:01

## 2020-01-01 RX ADMIN — DEXAMETHASONE SODIUM PHOSPHATE 4 MG: 4 INJECTION, SOLUTION INTRA-ARTICULAR; INTRALESIONAL; INTRAMUSCULAR; INTRAVENOUS; SOFT TISSUE at 06:01

## 2020-01-01 RX ADMIN — SENNOSIDES AND DOCUSATE SODIUM 1 TABLET: 8.6; 5 TABLET ORAL at 08:01

## 2020-01-01 RX ADMIN — SODIUM CHLORIDE 0.25 MCG/KG/MIN: 9 INJECTION, SOLUTION INTRAVENOUS at 03:01

## 2020-01-01 RX ADMIN — ACETAMINOPHEN 1000 MG: 10 INJECTION, SOLUTION INTRAVENOUS at 04:01

## 2020-01-01 RX ADMIN — HYDROMORPHONE HYDROCHLORIDE 1 MG: 1 INJECTION, SOLUTION INTRAMUSCULAR; INTRAVENOUS; SUBCUTANEOUS at 12:01

## 2020-01-01 RX ADMIN — DEXAMETHASONE SODIUM PHOSPHATE 4 MG: 4 INJECTION, SOLUTION INTRAMUSCULAR; INTRAVENOUS at 05:01

## 2020-01-01 RX ADMIN — POLYETHYLENE GLYCOL 3350 17 G: 17 POWDER, FOR SOLUTION ORAL at 12:01

## 2020-01-01 RX ADMIN — TAMSULOSIN HYDROCHLORIDE 0.4 MG: 0.4 CAPSULE ORAL at 08:01

## 2020-01-01 RX ADMIN — HYDROMORPHONE HYDROCHLORIDE 0.5 MG: 1 INJECTION, SOLUTION INTRAMUSCULAR; INTRAVENOUS; SUBCUTANEOUS at 08:01

## 2020-01-01 RX ADMIN — ALBUTEROL SULFATE 2.5 MG: 2.5 SOLUTION RESPIRATORY (INHALATION) at 08:01

## 2020-01-01 RX ADMIN — DEXAMETHASONE SODIUM PHOSPHATE 4 MG: 4 INJECTION, SOLUTION INTRA-ARTICULAR; INTRALESIONAL; INTRAMUSCULAR; INTRAVENOUS; SOFT TISSUE at 12:01

## 2020-01-01 RX ADMIN — DEXAMETHASONE SODIUM PHOSPHATE 4 MG: 4 INJECTION, SOLUTION INTRAMUSCULAR; INTRAVENOUS at 06:01

## 2020-01-01 RX ADMIN — GABAPENTIN 300 MG: 300 CAPSULE ORAL at 02:01

## 2020-01-01 RX ADMIN — ZOLEDRONIC ACID 4 MG: 0.04 INJECTION, SOLUTION INTRAVENOUS at 05:01

## 2020-01-01 RX ADMIN — HEPARIN SODIUM 5000 UNITS: 5000 INJECTION, SOLUTION INTRAVENOUS; SUBCUTANEOUS at 06:01

## 2020-01-01 RX ADMIN — OXYCODONE HYDROCHLORIDE 5 MG: 5 TABLET ORAL at 05:01

## 2020-01-01 RX ADMIN — SODIUM CHLORIDE, SODIUM LACTATE, POTASSIUM CHLORIDE, AND CALCIUM CHLORIDE 1000 ML: .6; .31; .03; .02 INJECTION, SOLUTION INTRAVENOUS at 11:01

## 2020-01-01 RX ADMIN — HYDROMORPHONE HYDROCHLORIDE 0.5 MG: 1 INJECTION, SOLUTION INTRAMUSCULAR; INTRAVENOUS; SUBCUTANEOUS at 07:01

## 2020-01-01 RX ADMIN — HYDROMORPHONE HYDROCHLORIDE 1 MG: 1 INJECTION, SOLUTION INTRAMUSCULAR; INTRAVENOUS; SUBCUTANEOUS at 05:01

## 2020-01-01 RX ADMIN — CEFAZOLIN 2 G: 1 INJECTION, POWDER, FOR SOLUTION INTRAMUSCULAR; INTRAVENOUS at 05:01

## 2020-01-01 RX ADMIN — FENTANYL CITRATE 100 MCG: 50 INJECTION, SOLUTION INTRAMUSCULAR; INTRAVENOUS at 03:01

## 2020-01-01 RX ADMIN — METHOCARBAMOL TABLETS 1000 MG: 500 TABLET, COATED ORAL at 08:01

## 2020-01-01 RX ADMIN — HYDROCODONE BITARTRATE AND ACETAMINOPHEN 1 TABLET: 5; 325 TABLET ORAL at 11:01

## 2020-01-01 RX ADMIN — OXYCODONE HYDROCHLORIDE 5 MG: 5 TABLET ORAL at 11:01

## 2020-01-01 RX ADMIN — HYDROMORPHONE HYDROCHLORIDE 0.5 MG: 1 INJECTION, SOLUTION INTRAMUSCULAR; INTRAVENOUS; SUBCUTANEOUS at 01:01

## 2020-01-01 RX ADMIN — OXYCODONE HYDROCHLORIDE 10 MG: 10 TABLET ORAL at 05:01

## 2020-01-01 RX ADMIN — SODIUM CHLORIDE: 0.9 INJECTION, SOLUTION INTRAVENOUS at 08:01

## 2020-01-01 RX ADMIN — METHOCARBAMOL 1000 MG: 500 TABLET, FILM COATED ORAL at 10:01

## 2020-01-01 RX ADMIN — HYDROCODONE BITARTRATE AND ACETAMINOPHEN 1 TABLET: 10; 325 TABLET ORAL at 12:01

## 2020-01-01 RX ADMIN — DEXAMETHASONE 4 MG: 4 TABLET ORAL at 11:01

## 2020-01-01 RX ADMIN — FENTANYL CITRATE 25 MCG: 50 INJECTION, SOLUTION INTRAMUSCULAR; INTRAVENOUS at 07:01

## 2020-01-01 RX ADMIN — HYDROCODONE BITARTRATE AND ACETAMINOPHEN 1 TABLET: 10; 325 TABLET ORAL at 04:01

## 2020-01-01 RX ADMIN — SODIUM CHLORIDE: 0.9 INJECTION, SOLUTION INTRAVENOUS at 01:01

## 2020-01-01 RX ADMIN — SODIUM CHLORIDE: 9 INJECTION, SOLUTION INTRAVENOUS at 02:01

## 2020-01-01 RX ADMIN — HYDROMORPHONE HYDROCHLORIDE 2 MG: 2 TABLET ORAL at 02:01

## 2020-01-01 RX ADMIN — HYDROMORPHONE HYDROCHLORIDE 0.5 MG: 1 INJECTION, SOLUTION INTRAMUSCULAR; INTRAVENOUS; SUBCUTANEOUS at 06:01

## 2020-01-01 RX ADMIN — OXYCODONE HYDROCHLORIDE 10 MG: 10 TABLET ORAL at 03:01

## 2020-01-01 RX ADMIN — METHOCARBAMOL TABLETS 1000 MG: 500 TABLET, COATED ORAL at 10:01

## 2020-01-01 RX ADMIN — HYDROCODONE BITARTRATE AND ACETAMINOPHEN 1 TABLET: 10; 325 TABLET ORAL at 03:01

## 2020-01-01 RX ADMIN — PHENYLEPHRINE HYDROCHLORIDE 150 MCG: 10 INJECTION INTRAVENOUS at 05:01

## 2020-01-01 RX ADMIN — TIZANIDINE 2 MG: 2 TABLET ORAL at 02:01

## 2020-01-01 RX ADMIN — SODIUM CHLORIDE: 0.9 INJECTION, SOLUTION INTRAVENOUS at 02:01

## 2020-01-01 RX ADMIN — SODIUM CHLORIDE: 9 INJECTION, SOLUTION INTRAVENOUS at 04:01

## 2020-01-01 RX ADMIN — GABAPENTIN 600 MG: 300 CAPSULE ORAL at 11:01

## 2020-01-01 RX ADMIN — HEPARIN SODIUM 5000 UNITS: 5000 INJECTION, SOLUTION INTRAVENOUS; SUBCUTANEOUS at 03:01

## 2020-01-01 RX ADMIN — SODIUM CHLORIDE: 900 INJECTION INTRAVENOUS at 10:01

## 2020-01-01 RX ADMIN — TAMSULOSIN HYDROCHLORIDE 0.4 MG: 0.4 CAPSULE ORAL at 10:01

## 2020-01-01 RX ADMIN — SODIUM CHLORIDE: 9 INJECTION, SOLUTION INTRAVENOUS at 06:01

## 2020-01-01 RX ADMIN — SODIUM PHOSPHATE, MONOBASIC, MONOHYDRATE 20.01 MMOL: 276; 142 INJECTION, SOLUTION INTRAVENOUS at 10:01

## 2020-01-01 RX ADMIN — FINASTERIDE 5 MG: 5 TABLET, FILM COATED ORAL at 10:01

## 2020-01-01 RX ADMIN — CEFAZOLIN 2 G: 1 INJECTION, POWDER, FOR SOLUTION INTRAMUSCULAR; INTRAVENOUS at 03:01

## 2020-01-01 RX ADMIN — HYDROMORPHONE HYDROCHLORIDE 4 MG: 2 TABLET ORAL at 04:01

## 2020-01-01 RX ADMIN — PROPOFOL 150 MG: 10 INJECTION, EMULSION INTRAVENOUS at 03:01

## 2020-01-01 RX ADMIN — ACETAMINOPHEN 1000 MG: 500 TABLET ORAL at 09:01

## 2020-01-01 RX ADMIN — REMIFENTANIL HYDROCHLORIDE 0.2 MCG/KG/MIN: 1 INJECTION, POWDER, LYOPHILIZED, FOR SOLUTION INTRAVENOUS at 03:01

## 2020-01-01 RX ADMIN — OXYCODONE HYDROCHLORIDE 5 MG: 5 TABLET ORAL at 08:01

## 2020-01-01 RX ADMIN — OXYCODONE HYDROCHLORIDE 5 MG: 5 TABLET ORAL at 12:01

## 2020-01-01 RX ADMIN — PANTOPRAZOLE SODIUM 40 MG: 40 TABLET, DELAYED RELEASE ORAL at 10:01

## 2020-01-01 RX ADMIN — HEPARIN SODIUM 5000 UNITS: 5000 INJECTION, SOLUTION INTRAVENOUS; SUBCUTANEOUS at 10:01

## 2020-01-01 RX ADMIN — MUPIROCIN: 20 OINTMENT TOPICAL at 06:01

## 2020-01-01 RX ADMIN — LORAZEPAM 0.5 MG: 2 INJECTION, SOLUTION INTRAMUSCULAR; INTRAVENOUS at 08:01

## 2020-01-01 RX ADMIN — SODIUM CHLORIDE 1000 ML: 0.9 INJECTION, SOLUTION INTRAVENOUS at 05:01

## 2020-01-02 NOTE — TELEPHONE ENCOUNTER
----- Message from Gina Salomon sent at 1/2/2020  9:50 AM CST -----  Contact: self  Is due for refill of hydrocodone.  Please call when ready.

## 2020-01-02 NOTE — ED PROVIDER NOTES
Encounter Date: 1/1/2020       History     Chief Complaint   Patient presents with    Back Pain     bilaterally lower back x3 weeks     69-year-old male with past medical history of GERD and renal cell carcinoma presents with chronic back pain.  Patient states he has been having back pain x1 month but over the past day is become progressively worse and unrelieved by his local that he takes at home.  Patient denies any recent trauma or injuries to his back, urinary symptoms, nausea, vomiting, diarrhea, chest pain or shortness of breath.  He is otherwise stable and has other complaints.        Review of patient's allergies indicates:   Allergen Reactions    Other Nausea And Vomiting     SODIUM PENATHOL  CAUSES SEVERE N & V     Past Medical History:   Diagnosis Date    GERD (gastroesophageal reflux disease)     Renal cell carcinoma      Past Surgical History:   Procedure Laterality Date    APPENDECTOMY      HERNIA REPAIR      NEPHRECTOMY       No family history on file.  Social History     Tobacco Use    Smoking status: Never Smoker   Substance Use Topics    Alcohol use: No    Drug use: No     Review of Systems   Constitutional: Negative for fever.   HENT: Negative for sore throat.    Respiratory: Negative for shortness of breath.    Cardiovascular: Negative for chest pain.   Gastrointestinal: Negative for nausea.   Genitourinary: Negative for dysuria.   Musculoskeletal: Positive for back pain.   Skin: Negative for rash.   Neurological: Negative for weakness.   Hematological: Does not bruise/bleed easily.   All other systems reviewed and are negative.      Physical Exam     Initial Vitals [01/01/20 1947]   BP Pulse Resp Temp SpO2   (!) 164/77 86 16 98.4 °F (36.9 °C) 97 %      MAP       --         Physical Exam    Nursing note and vitals reviewed.  Constitutional: He appears well-developed and well-nourished. He is not diaphoretic. No distress.   HENT:   Head: Normocephalic and atraumatic.   Mouth/Throat:  Oropharynx is clear and moist.   Eyes: Conjunctivae and EOM are normal. Pupils are equal, round, and reactive to light.   Neck: Normal range of motion. Neck supple. No thyromegaly present. No JVD present.   Cardiovascular: Normal rate, regular rhythm and normal heart sounds. Exam reveals no gallop and no friction rub.    No murmur heard.  Pulmonary/Chest: Breath sounds normal. No respiratory distress. He has no wheezes. He exhibits no tenderness.   Abdominal: Soft. Bowel sounds are normal. He exhibits no distension. There is no tenderness. There is no rebound and no guarding.   Musculoskeletal: Normal range of motion. He exhibits tenderness. He exhibits no edema.   Neurological: He is alert and oriented to person, place, and time. He has normal strength. No cranial nerve deficit or sensory deficit.   Skin: Skin is warm and dry. Capillary refill takes less than 2 seconds. No rash noted. No erythema.   Psychiatric: He has a normal mood and affect.         ED Course   Procedures  Labs Reviewed - No data to display       Imaging Results          CT Lumbar Spine Without Contrast (Final result)  Result time 01/01/20 20:29:16    Final result by Janice Ash MD (01/01/20 20:29:16)                 Narrative:    Exam: CT OF THE LUMBAR SPINE WITHOUT INTRAVENOUS CONTRAST    Clinical data: Low back pain, minor trauma.    Technique: Spiral axial CT images through the lumbar spine were acquired without  contrast, reconstructed in axial and sagittal projections and imaged using soft  tissue and bone algorithms. Reformatted/MPR images were performed. Radiation  dose: CTDIvol = 16.40 mGy, DLP = 528.10 mGy x cm.    Limitations: None.    Prior studies: No prior studies submitted.    Findings:    There is grossly unremarkable alignment without acute fracture or subluxation.  Bone mineralization is grossly unremarkable. Vertebral body heights are  maintained. Posterior elements are intact.    Inter-vertebral disc spaces: Mild  diffuse disc bulge (3 mm) are noted at L4/L5  level causing mild bilateral neural foraminal narrowing.  There is bilateral  sacroiliac joint osteoarthritis with partial ankylosing.    No CT evidence of bony spinal canal or neural foramen stenosis. Soft tissues are  grossly unremarkable.    IMPRESSION:    Mild diffuse disc bulge (3 mm) at L4/L5 level causing mild bilateral neural  foraminal narrowing.  Bilateral sacroiliac joint osteoarthritis.    Recommendation: Follow up as clinically indicated.    All CT scans at this facility utilize dose modulation, iterative reconstruction,  and/or weight based dosing when appropriate to reduce radiation dose to as low  as reasonably achievable.      Electronically Signed by DARCY CONTRERAS MD at 1/1/2020 10:10:20 PM                               Medical Decision Making:   Initial Assessment:   69-year-old male on initial assessment in mild distress secondary to his back pain. Patient is alert and oriented x3, neurologically neurovascular intact with no focal deficits.  Patient is nontoxic at this time with stable vitals.  Differential Diagnosis:   Scoliosis, degenerative joint disease, herniated disc, quality complaining syndrome, metastatic cancer  Clinical Tests:   Lab Tests: Ordered and Reviewed  The following lab test(s) were unremarkable: CBC, CMP and Urinalysis  Radiological Study: Ordered and Reviewed  ED Management:  Patient has been reassessed noted to have no acute changes condition.  Patient was given IM Toradol lower Robaxin and symptoms have mildly improved.  Patient's CT shows chronic degenerative joint disease with slight bulging disc in the lumbar region.  Patient does have orthopedic doctor to follow up with and will continue as planned with follow-up care.  Patient has remained stable while in the ED we discharged home stable condition. Mr. Richard is aware of the plan and in agreement.                   ED Course as of Jan 01 2239 Wed Jan 01, 2020    1955 69-year-old male with a history of renal cell carcinoma, nephrectomy and solitary kidney now who is no longer undergoing treatment presents to the emergency department with complaints of worsening back pain.      Dr. Lara is his primary care provider, and has been having chronic back pain and however family states for the last 2 days the pain   has worsened.    No history of bony metastasis at this time.    [BF]      ED Course User Index  [BF] DANIELLE Coelho                Clinical Impression:       ICD-10-CM ICD-9-CM   1. Low back pain, unspecified back pain laterality, unspecified chronicity, unspecified whether sciatica present M54.5 724.2   2. Sciatica, unspecified laterality M54.30 724.3                             Sid Gary MD  01/01/20 0951

## 2020-01-07 PROBLEM — M54.50 CHRONIC MIDLINE LOW BACK PAIN: Status: ACTIVE | Noted: 2020-01-01

## 2020-01-07 PROBLEM — G89.29 CHRONIC MIDLINE LOW BACK PAIN: Status: ACTIVE | Noted: 2020-01-01

## 2020-01-07 PROBLEM — N17.9 AKI (ACUTE KIDNEY INJURY): Status: ACTIVE | Noted: 2020-01-01

## 2020-01-07 NOTE — ED PROVIDER NOTES
Encounter Date: 1/7/2020       History     Chief Complaint   Patient presents with    Extremity Weakness     BILAT, X 1 MOS    Back Pain     SENT FROM DR DALAL OFFICE FOR FURTHER EVAL WITH MRI WITH CONTRAST     A 69-year-old male who presents to the emergency department at the recommendation of his primary care provider for evaluation of persistent lower back pain. Patient recently presented to the emergency department on January 1st for similar complaints, he had a normal neurologic exam and no red flag findings had a normal CT scan of the lumbar spine and was ultimately discharged with pain management.  He followed up with his primary care provider as instructed, he had an MRI of his lumbar spine ordered yesterday which was completed partially.  He was able to complete the non contrasted portion of the scan however the pain was too severe to complete his contrasted scan.  His initial reading demonstrated a L2 mass in the intrathecal space and at the recommendation of his primary care provider he is returned to the emergency department for further workup and evaluation.        Review of patient's allergies indicates:   Allergen Reactions    Other Nausea And Vomiting     SODIUM PENATHOL  CAUSES SEVERE N & V     Past Medical History:   Diagnosis Date    GERD (gastroesophageal reflux disease)     Renal cell carcinoma      Past Surgical History:   Procedure Laterality Date    APPENDECTOMY      HERNIA REPAIR      NEPHRECTOMY       No family history on file.  Social History     Tobacco Use    Smoking status: Never Smoker   Substance Use Topics    Alcohol use: No    Drug use: No     Review of Systems   Constitutional: Negative for fever.   HENT: Negative for sore throat.    Eyes: Negative for redness.   Respiratory: Negative for shortness of breath.    Cardiovascular: Negative for chest pain.   Gastrointestinal: Negative for nausea.   Genitourinary: Negative for dysuria.   Musculoskeletal: Positive for back  pain.   Skin: Negative for rash and wound.   Neurological: Positive for weakness. Negative for numbness.   Hematological: Does not bruise/bleed easily.   Psychiatric/Behavioral: Negative for behavioral problems. The patient is not nervous/anxious.    All other systems reviewed and are negative.      Physical Exam     Initial Vitals [01/07/20 1501]   BP Pulse Resp Temp SpO2   (!) 145/71 85 16 98.7 °F (37.1 °C) 98 %      MAP       --         Physical Exam    Constitutional: He appears well-developed and well-nourished.   HENT:   Head: Normocephalic and atraumatic.   Eyes: Conjunctivae, EOM and lids are normal.   Neck: Normal range of motion and full passive range of motion without pain.   Cardiovascular: Normal rate and regular rhythm.   Pulmonary/Chest: Breath sounds normal. No respiratory distress.   Abdominal: Soft. Bowel sounds are normal. There is no tenderness.   Musculoskeletal: Normal range of motion.   Neurological: He is alert and oriented to person, place, and time. He has normal strength and normal reflexes. No sensory deficit. GCS score is 15. GCS eye subscore is 4. GCS verbal subscore is 5. GCS motor subscore is 6.   Skin: Skin is warm, dry and intact.   Psychiatric: He has a normal mood and affect. His speech is normal and behavior is normal.         ED Course   Procedures  Labs Reviewed - No data to display       Imaging Results    None          Medical Decision Making:   History:   I obtained history from: someone other than patient.       <> Summary of History: History was obtained from the patient and/or the patient's immediate family member present.  Old Medical Records: I decided to obtain old medical records.  Old Records Summarized: records from clinic visits and records from previous admission(s).  Initial Assessment:   NAD  Differential Diagnosis:   The patient's differential diagnoses includes but is not limited to chronic back pain, cancer with metastasis  Clinical Tests:   Radiological  Study: Ordered  ED Management:  Patient will be admitted to Hospital Medicine for further workup and evaluation of new L to intrathecal mass suspicious for metastasis based on patient's history of prior renal cell carcinoma.    Hospital Medicine at bedside to evaluate patient  Other:   I have discussed this case with another health care provider.       <> Summary of the Discussion: The patient's renal function was discussed with the radiologist, who recommends proceeding with contrast scan.                   ED Course as of Jan 07 1629   Tue Jan 07, 2020   1504 Pain to lower back with progressive weakness to lower extremities sent here by Dr Lara for further evaluation     [MP]      ED Course User Index  [MP] LUIS CARLOS Casillas                Clinical Impression:       ICD-10-CM ICD-9-CM   1. Chronic midline low back pain, unspecified whether sciatica present M54.5 724.2    G89.29 338.29   2. Spinal cord mass G95.89 336.8                             DANIELLE Coelho  01/07/20 [id number]

## 2020-01-07 NOTE — ED NOTES
Pt standing steadily at BS.  Alert and well ambulatory.  MAEW, .  Alert and oriented x 4.  No neuro or MS deficits noted.  Fall precautions.  Family at BS and SR up x 2.

## 2020-01-08 PROBLEM — M54.9 BACK PAIN: Status: ACTIVE | Noted: 2020-01-01

## 2020-01-08 PROBLEM — N17.9 AKI (ACUTE KIDNEY INJURY): Status: RESOLVED | Noted: 2020-01-01 | Resolved: 2020-01-01

## 2020-01-08 NOTE — ASSESSMENT & PLAN NOTE
With mets to lungs, bone, spine and possible pancreas.  Patient has been seen by Dr. Riggs in the past and is known to him.  Will consult Oncology.

## 2020-01-08 NOTE — ED NOTES
TOREY.  Alert and oriented. No RD.  No abdominal distension.NSR.  ++ pulses x 4 and brisk cap refill.

## 2020-01-08 NOTE — PROGRESS NOTES
[facility] Medicine  Progress Note    Patient Name: Raymundo Richard  MRN: [id number]  Patient Class: OP- Observation   Admission Date: 1/7/2020  Length of Stay: 0 days  Attending Physician: Jayne Spears MD  Primary Care Provider: Ryan Rose MD        Subjective:     Principal Problem:Chronic midline low back pain        HPI:  69-year-old male with past medical history of renal cell carcinoma status post right nephrectomy in 2013 who presents with complaints of back pain. The patient states that the pain has been present for about a month that has increasingly gotten worse over the past few days.  The pain is severe, 10/10 in intensity radiating down both his legs.  He denies any loss of bowel or bladder function, any saddle anesthesia.  He denies bloody urine, fever, chills or night sweats.  He denies dysuria.    The patient states that after his nephrectomy in 2013 6 months later he had pulmonary nodules that were found and when I eventually biopsied in 2018 pathology of the biopsy was consistent with clear cell renal carcinoma.  He states that since then the cancer had spread to his pancreas and bones.  He had a CT scan done in March 2019 which showed a progression of the pulmonary Mets with a new pancreatic mass and some osseous bone metastasis.  He has tried to pursue therapy at [facility] and had been on sorafenib.      Overview/Hospital Course:  No notes on file    Interval History: he had an MRI with contrast yesterday. He states his pain is not well controlled, he is on dilaudid 0.5mg q4hrs which he says just takes the edge off the pain minimally. He denies perineal anesthesia, has no loss of bowel or bladder function.    Review of Systems   HENT: Negative for congestion and sore throat.    Cardiovascular: Negative for chest pain and palpitations.   Gastrointestinal: Negative for abdominal pain, constipation, diarrhea, nausea and vomiting.   Genitourinary: Negative for  difficulty urinating, dysuria, hematuria and urgency.   Musculoskeletal: Positive for back pain.   Skin: Negative for rash.   Neurological: Negative for numbness.     Objective:     Vital Signs (Most Recent):  Temp: 98.3 °F (36.8 °C) (01/08/20 0732)  Pulse: 91 (01/08/20 0732)  Resp: 18 (01/08/20 0732)  BP: (!) 158/83 (01/08/20 0732)  SpO2: 95 % (01/08/20 0732) Vital Signs (24h Range):  Temp:  [97.7 °F (36.5 °C)-98.7 °F (37.1 °C)] 98.3 °F (36.8 °C)  Pulse:  [] 91  Resp:  [16-21] 18  SpO2:  [94 %-98 %] 95 %  BP: (122-162)/(62-83) 158/83     Weight: 68 kg (149 lb 14.6 oz)  Body mass index is 24.95 kg/m².  No intake or output data in the 24 hours ending 01/08/20 1112   Physical Exam   Constitutional: He is oriented to person, place, and time. He appears well-developed and well-nourished. No distress.   HENT:   Head: Normocephalic and atraumatic.   Mouth/Throat: Oropharynx is clear and moist. No oropharyngeal exudate.   Eyes: Pupils are equal, round, and reactive to light. EOM are normal. No scleral icterus.   Neck: No thyromegaly present.   Cardiovascular: Normal rate, regular rhythm and normal heart sounds.   No murmur heard.  Pulmonary/Chest: Effort normal and breath sounds normal. No respiratory distress. He has no wheezes. He has no rales.   Abdominal: Soft. Bowel sounds are normal. He exhibits no distension. There is no tenderness. No hernia.   Musculoskeletal: Normal range of motion. He exhibits tenderness (lower lumbar tenderness). He exhibits no edema.   Positive straight leg test.      Lymphadenopathy:     He has no cervical adenopathy.   Neurological: He is alert and oriented to person, place, and time. He displays normal reflexes. No cranial nerve deficit.   Skin: Skin is warm. Capillary refill takes less than 2 seconds. No rash noted.   Psychiatric: He has a normal mood and affect.   Nursing note and vitals reviewed.      Significant Labs:   BMP:   Recent Labs   Lab 01/08/20  0443   *       K 3.8      CO2 26   BUN 35*   CREATININE 1.3   CALCIUM 11.2*     CBC:   Recent Labs   Lab 01/07/20  1807 01/08/20  0443   WBC 7.47 8.75   HGB 11.6* 11.3*   HCT 36.4* 36.3*    267     Magnesium: No results for input(s): MG in the last 48 hours.    Significant Imaging: I have reviewed all pertinent imaging results/findings within the past 24 hours.     Imaging Results          MRI Lumbar Spine With Contrast (Final result)  Result time 01/07/20 21:05:29    Final result by Toya Dubose MD (01/07/20 21:05:29)                 Narrative:        Exam: MRI OF THE LUMBAR SPINE WITH CONTRAST    Clinical data: Primary tumor.    Technique: Multiplanar multisequence MRI of the lumbar spine with contrast.  Axial imaging was performed through the L1 through S1 disc levels.    Prior studies: MRI of the lumbar spine dated 01/06/2020. CT scan of the lumbar  spine dated 01/01/20.    Findings: For the purposes of this examination, spinal levels were labeled  assuming five nonrib-bearing, lumbar-type vertebrae with the inferior labeled  L5. Normal lordotic curvature. No acute fracture or subluxation.    Normal vertebral body and intervertebral disc heights. Normal marrow signal of  the vertebrae. Mild degenerative changes in the bilateral sacroiliac joint and  multilevel facet arthropathy in the lumbar spine from L3-L5 level.    A homogeneously enhancing 1.8 x 1.3 cm intradural lesion at the level of the L2  vertebra.  Possibility of neurofibroma.    Soft tissues are unremarkable.    Individual spinal levels are described as follows:    T12-L1: No definitive spinal canal or neural foraminal stenosis.    L1-L2: There is no abnormally positioned disc material. There is no significant  spinal canal, lateral recess, neural foramina compromise, or nerve impingement.    L2-L3: There is no abnormally positioned disc material. There is no significant  spinal canal, lateral recess, neural foramina compromise, or nerve  impingement.    L3-L4: There is no abnormally positioned disc material. There is no significant  spinal canal, lateral recess, neural foramina compromise, or nerve impingement.    L4-L5: Severe bilateral facet arthropathy.  Broad-based circumferential 4 mm  disc bulge.  Mild narrowing of the bilateral lateral recesses and left neural  foramina.  Abutment of bilateral traversing L5 and exiting L4 nerve root.    L5-S1: There is no abnormally positioned disc material. There is no significant  spinal canal, lateral recess, neural foramina compromise, or nerve impingement.    IMPRESSION:    1. A homogeneously enhancing 1.8 x 1.3 cm intradural lesion at the level of the  L2 vertebra.  Possibility of neurofibroma.    2. L4-L5: Severe bilateral facet arthropathy.  Broad-based circumferential 4 mm  disc bulge.  Mild narrowing of the bilateral lateral recesses and left neural  foramina.  Abutment of bilateral traversing L5 and exiting L4 nerve root.    Other MRI images are not available.    Recommendation: Follow up as clinically indicated.          Electronically Signed by PHILIP ARVIZU MD at 1/7/2020 11:04:45 PM                                  Assessment/Plan:      * Chronic midline low back pain  Likely secondary to metastatic spine disease. However MRI of the spine shows a homogenous mass at L2 which is reported as likely a neurofibroma. He also has spine disease at L4-L5.   Will increase his  IV pain medication as needed to control pain -- with dialudid 1mg q4hh.   IV steroids  Patient shows no sign of acute spinal cord compression, no neurosurgery available.       Renal cell carcinoma  With mets to lungs, bone, spine and possible pancreas.  Patient has been seen by Dr. Riggs in the past and is known to him.  Oncology consult pending.        CLEMENTE (acute kidney injury)  Improved today,         VTE Risk Mitigation (From admission, onward)         Ordered     IP VTE HIGH RISK PATIENT  Once      01/07/20 2201                       Jayne Spears MD  Department of Hospital Medicine   [facility]

## 2020-01-08 NOTE — TELEPHONE ENCOUNTER
Consult taken and given to Dr Tinajero.        ----- Message from Nickolas Peters sent at 1/8/2020 10:47 AM CST -----  Type: Needs Medical Advice    Who Called:  Quin   Symptoms (please be specific):  N A  How long has patient had these symptoms:  N A  Pharmacy name and phone #:    Best Call Back Number: [phone]   Additional Information: Patient was admitted last night at [facility]. Rm number [id number].

## 2020-01-08 NOTE — ASSESSMENT & PLAN NOTE
Likely secondary to metastatic spine disease. Will complete his MRI lumbosacral with contrast.  IV pain medication as needed to control pain. Patient shows no sign of acute spinal cord compression, will consult Neurosurgery for imput.

## 2020-01-08 NOTE — H&P
[facility] Medicine  History & Physical    Patient Name: Raymundo Richard  MRN: [id number]  Admission Date: 1/7/2020  Attending Physician: Jayne Spears MD  Primary Care Provider: Ryan Rose MD         Patient information was obtained from patient, past medical records and ER records.     Subjective:     Principal Problem:Chronic midline low back pain    Chief Complaint:   Chief Complaint   Patient presents with    Extremity Weakness     BILAT, X 1 MOS    Back Pain     SENT FROM DR DALAL OFFICE FOR FURTHER EVAL WITH MRI WITH CONTRAST        HPI: 69-year-old male with past medical history of renal cell carcinoma status post right nephrectomy in 2013 who presents with complaints of back pain. The patient states that the pain has been present for about a month that has increasingly gotten worse over the past few days.  The pain is severe, 10/10 in intensity radiating down both his legs.  He denies any loss of bowel or bladder function, any saddle anesthesia.  He denies bloody urine, fever, chills or night sweats.  He denies dysuria.    The patient states that after his nephrectomy in 2013 6 months later he had pulmonary nodules that were found and when I eventually biopsied in 2018 pathology of the biopsy was consistent with clear cell renal carcinoma.  He states that since then the cancer had spread to his pancreas and bones.  He had a CT scan done in March 2019 which showed a progression of the pulmonary Mets with a new pancreatic mass and some osseous bone metastasis.  He has tried to pursue therapy at [facility] and had been on sorafenib.      Past Medical History:   Diagnosis Date    GERD (gastroesophageal reflux disease)     Renal cell carcinoma        Past Surgical History:   Procedure Laterality Date    APPENDECTOMY      HERNIA REPAIR      NEPHRECTOMY         Review of patient's allergies indicates:   Allergen Reactions    Other Nausea And Vomiting     SODIUM PENATHOL   CAUSES SEVERE N & V       No current facility-administered medications on file prior to encounter.      Current Outpatient Medications on File Prior to Encounter   Medication Sig    finasteride (PROSCAR) 5 mg tablet Take 5 mg by mouth once daily.    gabapentin (NEURONTIN) 300 MG capsule Take 600 mg by mouth 2 (two) times daily.    HYDROcodone-acetaminophen (NORCO)  mg per tablet Take 1 tablet by mouth every 8 (eight) hours as needed for Pain.    ibuprofen (ADVIL,MOTRIN) 600 MG tablet Take 1 tablet (600 mg total) by mouth every 6 (six) hours as needed for Pain.    methocarbamol (ROBAXIN) 500 MG Tab Take 2 tablets (1,000 mg total) by mouth 3 (three) times daily. for 5 days    pantoprazole (PROTONIX) 40 MG tablet Take 1 tablet (40 mg total) by mouth once daily.    predniSONE (DELTASONE) 20 MG tablet Take 40 mg by mouth once daily. For 5 days    tamsulosin (FLOMAX) 0.4 mg Cap Take 0.4 mg by mouth once daily.    terbinafine HCl (LAMISIL) 250 mg tablet Take 250 mg by mouth once daily. For 10 days. Pt takes from 12th  of month to the 22 nd as per pt    saw palmetto frt/phytosterol 2 (PROSTATE SR) 160-250 mg Cap Take 1 capsule by mouth 2 (two) times daily.    [DISCONTINUED] finasteride (PROSCAR) 5 mg tablet TAKE ONE TABLET BY MOUTH DAILY (Patient taking differently: Take 5 mg by mouth. )    [DISCONTINUED] tamsulosin (FLOMAX) 0.4 mg Cap TAKE ONE CAPSULE BY MOUTH ONCE DAILY (Patient taking differently: 0.4 mg. )     Family History     None        Tobacco Use    Smoking status: Never Smoker   Substance and Sexual Activity    Alcohol use: No    Drug use: No    Sexual activity: Not on file     Review of Systems   Constitutional: Negative for chills and diaphoresis.   HENT: Negative for congestion and sore throat.    Cardiovascular: Negative for chest pain and palpitations.   Gastrointestinal: Negative for abdominal pain, constipation, diarrhea, nausea and vomiting.   Genitourinary: Negative for difficulty  urinating, dysuria, flank pain, hematuria and urgency.   Musculoskeletal: Positive for back pain.   Skin: Negative for rash.     Objective:     Vital Signs (Most Recent):  Temp: 98.7 °F (37.1 °C) (01/07/20 1728)  Pulse: 85 (01/07/20 1808)  Resp: (!) 21 (01/07/20 1808)  BP: (!) 147/71 (01/07/20 1800)  SpO2: 97 % (01/07/20 1808) Vital Signs (24h Range):  Temp:  [98.2 °F (36.8 °C)-98.7 °F (37.1 °C)] 98.7 °F (37.1 °C)  Pulse:  [80-96] 85  Resp:  [16-21] 21  SpO2:  [94 %-98 %] 97 %  BP: (122-147)/(62-80) 147/71     Weight: 68 kg (150 lb)  Body mass index is 24.96 kg/m².    Physical Exam   Constitutional: He is oriented to person, place, and time. He appears well-developed and well-nourished. No distress.   HENT:   Head: Normocephalic and atraumatic.   Mouth/Throat: Oropharynx is clear and moist. No oropharyngeal exudate.   Eyes: Pupils are equal, round, and reactive to light. EOM are normal. No scleral icterus.   Neck: No thyromegaly present.   Cardiovascular: Normal rate, regular rhythm and normal heart sounds.   No murmur heard.  Pulmonary/Chest: Effort normal and breath sounds normal. No respiratory distress. He has no wheezes. He has no rales.   Abdominal: Soft. Bowel sounds are normal. He exhibits no distension. There is no tenderness. No hernia.   Musculoskeletal: Normal range of motion. He exhibits tenderness (lower lumbar tenderness). He exhibits no edema.   Positive straight leg test.      Lymphadenopathy:     He has no cervical adenopathy.   Neurological: He is alert and oriented to person, place, and time. He displays normal reflexes. No cranial nerve deficit.   Skin: Skin is warm. Capillary refill takes less than 2 seconds. No rash noted.   Psychiatric: He has a normal mood and affect.   Nursing note and vitals reviewed.        CRANIAL NERVES     CN III, IV, VI   Pupils are equal, round, and reactive to light.  Extraocular motions are normal.        Significant Labs:   BMP:   Recent Labs   Lab 01/06/20  1533    BUN 26*   CREATININE 1.5*     CBC: No results for input(s): WBC, HGB, HCT, PLT in the last 48 hours.  Magnesium: No results for input(s): MG in the last 48 hours.    Significant Imaging: I have reviewed all pertinent imaging results/findings within the past 24 hours.     Imaging Results    None           Assessment/Plan:     * Chronic midline low back pain  Likely secondary to metastatic spine disease. Will complete his MRI lumbosacral with contrast.  IV pain medication as needed to control pain. Patient shows no sign of acute spinal cord compression, will consult Neurosurgery for imput.       Renal cell carcinoma  With mets to lungs, bone, spine and possible pancreas.  Patient has been seen by Dr. Riggs in the past and is known to him.  Will consult Oncology.        CLEMENTE (acute kidney injury)  Gentle fluid hydration with normal saline.  Follow renal panel.        VTE Risk Mitigation (From admission, onward)    None             Jayne Spears MD  Department of Hospital Medicine   [facility]

## 2020-01-08 NOTE — HPI
69-year-old male with past medical history of renal cell carcinoma status post right nephrectomy in 2013 who presents with complaints of back pain. The patient states that the pain has been present for about a month that has increasingly gotten worse over the past few days.  The pain is severe, 10/10 in intensity radiating down both his legs.  He denies any loss of bowel or bladder function, any saddle anesthesia.  He denies bloody urine, fever, chills or night sweats.  He denies dysuria.    The patient states that after his nephrectomy in 2013 6 months later he had pulmonary nodules that were found and when I eventually biopsied in 2018 pathology of the biopsy was consistent with clear cell renal carcinoma.  He states that since then the cancer had spread to his pancreas and bones.  He had a CT scan done in March 2019 which showed a progression of the pulmonary Mets with a new pancreatic mass and some osseous bone metastasis.  He has tried to pursue therapy at [facility] and had been on sorafenib.

## 2020-01-08 NOTE — ASSESSMENT & PLAN NOTE
With mets to lungs, bone, spine and possible pancreas.  Patient has been seen by Dr. Riggs in the past and is known to him.  Oncology consult pending.

## 2020-01-08 NOTE — ED NOTES
CRITICAL LABS REPORTED TO ADMITTING [facility] CALLED AND NOTIFIED PT NOT GETTING MRI SECONDARY TO CRITICAL LABS.

## 2020-01-08 NOTE — ASSESSMENT & PLAN NOTE
Likely secondary to metastatic spine disease. However MRI of the spine shows a homogenous mass at L2 which is reported as likely a neurofibroma. He also has spine disease at L4-L5.   Will increase his  IV pain medication as needed to control pain -- with dialudid 1mg q4hh.   IV steroids  Patient shows no sign of acute spinal cord compression, no neurosurgery available.

## 2020-01-08 NOTE — PLAN OF CARE
CM met with patient and family members at bedside.They are aware that the transfer center is working on transfer for neurosurgery consult. Patient's physical address is [address] [place] [address]. Patient denies POA and reports he is working on a living will. Next of kin is daughter, Dayami Paula [phone]. Prior to admission patient reports independence, drove and denies any assist devices or home health. Pharmacy is [facility] and PCP is Dr. KELLY Lara. CM will follow for dc planning needs.        01/08/20 2855   Discharge Assessment   Assessment Type Discharge Planning Assessment   Confirmed/corrected address and phone number on facesheet? Yes   Assessment information obtained from? Patient;Caregiver   Communicated expected length of stay with patient/caregiver yes   Prior to hospitilization cognitive status: Alert/Oriented   Prior to hospitalization functional status: Independent   Current cognitive status: Alert/Oriented   Current Functional Status: Needs Assistance   Lives With alone   Able to Return to Prior Arrangements   (unknown at this time)   Is patient able to care for self after discharge? Unable to determine at this time (comments)   Patient's perception of discharge disposition home health   Readmission Within the Last 30 Days no previous admission in last 30 days   Patient currently being followed by outpatient case management? No   Patient currently receives any other outside agency services? No   Equipment Currently Used at Home none   Do you have any problems affording any of your prescribed medications? No   Is the patient taking medications as prescribed? yes   Does the patient have transportation home? Yes   Transportation Anticipated family or friend will provide   Does the patient receive services at the [facility]? No   Discharge Plan A   (patient to transfer to another facility for neurosurgery consult)   DME Needed Upon Discharge  none   Patient/Family in Agreement with  Plan yes

## 2020-01-08 NOTE — CONSULTS
[facility]  Hematology/Oncology  Consult Note    Patient Name: Raymundo Richard  MRN: [id number]  Admission Date: 1/7/2020  Hospital Length of Stay: 0 days  Code Status: Full Code   Attending Provider: Jayne Spears MD  Consulting Provider: Wendy Tinajero MD  Primary Care Physician: Ryan Rose MD  Principal Problem:Chronic midline low back pain    Consults  Subjective:     HPI:  69-year-old  male known to Dr. Anna and follows with MD Alexander with metastatic renal cell CA status post right nephrectomy 2013 present to the emergency room with complaints of back pain patient had pulmonary nodules diagnosed 6 months after his nephrectomy in 2013 which were biopsied and were consistent with clear cell carcinoma currently does also spread to the pancreas and bones lays a new pancreatic mass patient was seen by Dr. anna patient was given sorafenib which she has not taken patient states if it is incurable he would rather have a quality of life and has not sought any attention to the same leg pain has been new up until then patient was able to perform all activities of daily living without issue.  The pain was acute and severe started on Dilaudid IV which only takes the edge off the pain minimally  Oncology Treatment Plan:   [No treatment plan]    Medications:  Continuous Infusions:   sodium chloride 0.9% 125 mL/hr at 01/07/20 2215     Scheduled Meds:   finasteride  5 mg Oral Daily    gabapentin  600 mg Oral BID    lorazepam  0.5 mg Intravenous Once    methocarbamol  1,000 mg Oral TID    methylPREDNISolone sodium succinate  40 mg Intravenous BID    pantoprazole  40 mg Oral Daily    polyethylene glycol  17 g Oral Daily    tamsulosin  0.4 mg Oral Daily     PRN Meds:HYDROmorphone     Review of patient's allergies indicates:   Allergen Reactions    Other Nausea And Vomiting     SODIUM PENATHOL  CAUSES SEVERE N & V        Past Medical History:   Diagnosis Date    GERD (gastroesophageal  reflux disease)     Renal cell carcinoma      Past Surgical History:   Procedure Laterality Date    APPENDECTOMY      HERNIA REPAIR      NEPHRECTOMY       Family History     None        Tobacco Use    Smoking status: Never Smoker   Substance and Sexual Activity    Alcohol use: No    Drug use: No    Sexual activity: Not on file       Review of Systems   Pain and discomfort to leg which started acute Dilaudid is not helping  Patient wants something  definitive to be done    REVIEW OF SYSTEMS:     CONSTITUTIONAL: The patient denies any weight change. There is no apparent    change in appetite, fever, night sweats, headaches, fatigue, dizziness, or    weakness.      SKIN: Denies rash, issues with nails, non-healing sores, bleeding, blotching    skin or abnormal bruising. Denies new moles or changes to existing moles.      BREASTS: There is no swelling around breasts or nipple discharge.    EYES: Denies eye pain, blurred vision, swelling, redness or discharge.      ENT AND MOUTH: Denies runny nose, stuffiness, sinus trouble or sores. Denies    nosebleeds. Denies, hoarseness, change in voice or swelling in front of the    neck.      CARDIOVASCULAR: Denies chest pain, discomfort or palpitations. Denies neck    swelling or episodes of passing out.      RESPIRATORY: Denies cough, sputum production, blood in sputum, and denies    shortness of breath.      GI: Denies trouble swallowing, indigestion, heartburn, abdominal pain, nausea,    vomiting, diarrhea, altered bowel habits, blood in stool, discoloration of    stools, change in nature of stool, bloating, increased abdominal girth.      GENITOURINARY: No discharge. No pelvic pain or lumps. No rash around groin or  lesions. No urinary frequency, hesitation, painful urination or blood in    urine. Denies incontinence. No problems with intercourse.      NEUROLOGICAL: Denies tingling, numbness, altered mentation changes to nerve    function in the face, weakness to one  or both of the body. Denies changes to    gait and denies multiple falls or accidents.      PSYCHIATRIC: Denies nervousness, anxiety, hallucinations, depression, suicidal    ideation, trouble sleeping or changes in behavior noticed by family.      The patient denies recent foreign travel or recent exposure to chemicals or    products of concern or infectious diseases.           Objective:     Vital Signs (Most Recent):  Temp: 97.9 °F (36.6 °C) (01/08/20 1200)  Pulse: 95 (01/08/20 1200)  Resp: 18 (01/08/20 1200)  BP: (!) 154/81 (01/08/20 1200)  SpO2: (!) 94 % (01/08/20 1200) Vital Signs (24h Range):  Temp:  [97.7 °F (36.5 °C)-98.7 °F (37.1 °C)] 97.9 °F (36.6 °C)  Pulse:  [] 95  Resp:  [16-21] 18  SpO2:  [94 %-98 %] 94 %  BP: (129-162)/(62-83) 154/81     Weight: 68 kg (149 lb 14.6 oz)  Body mass index is 24.95 kg/m².  Body surface area is 1.77 meters squared.    No intake or output data in the 24 hours ending 01/08/20 1405    Physical Exam  Alert awake oriented hard of hearing  PHYSICAL EXAM:     Vitals:    01/08/20 1200   BP: (!) 154/81   Pulse: 95   Resp: 18   Temp: 97.9 °F (36.6 °C)       GENERAL: Comfortable looking patient. Patient is in no distress.  Awake, alert and oriented to time, person and place.  No anxiety, or agitation.      HEENT: Normal conjunctivae and eyelids. WNL.  PERRLA 3 to 4 mm. No icterus, no pallor, no congestion, and no discharge noted.     NECK:  Supple. Trachea is central.  No crepitus.  No JVD or masses.    RESPIRATORY:  No intercostal retractions.  No dullness to percussion.  Chest is clear to auscultation.  No rales, rhonchi or wheezes.  No crepitus.  Good air entry bilaterally.    CARDIOVASCULAR:  S1 and S2 are normally heard without murmurs or gallops.  All peripheral pulses are present.    ABDOMEN:  Normal abdomen.  No hepatosplenomegaly.  No free fluid.  Bowel sounds are present.  No hernia noted. No masses.  No rebound or tenderness.  No guarding or rigidity.  Umbilicus is  midline.    LYMPHATICS:  No axillary, cervical, supraclavicular, submental, or inguinal lymphadenopathy.    SKIN/MUSCULOSKELETAL:  There is no evidence of excoriation marks or ecchmosis.  No rashes.  No cyanosis.  No clubbing.  No joint or skeletal deformities noted.  Normal range of motion.    NEUROLOGIC:  Higher functions are appropriate.  No cranial nerve deficits.  Normal bernie.  Normal strength.  Motor and sensory functions are normal.  Deep tendon reflexes are normal.    GENITAL/RECTAL:  Exams are deferred.  Significant Labs:   Lab Results   Component Value Date    WBC 8.75 01/08/2020    HGB 11.3 (L) 01/08/2020    HCT 36.3 (L) 01/08/2020    MCV 86 01/08/2020     01/08/2020     CMP  Sodium   Date Value Ref Range Status   01/08/2020 141 136 - 145 mmol/L Final     Potassium   Date Value Ref Range Status   01/08/2020 3.8 3.5 - 5.1 mmol/L Final     Chloride   Date Value Ref Range Status   01/08/2020 108 95 - 110 mmol/L Final     CO2   Date Value Ref Range Status   01/08/2020 26 23 - 29 mmol/L Final     Glucose   Date Value Ref Range Status   01/08/2020 143 (H) 70 - 110 mg/dL Final     BUN, Bld   Date Value Ref Range Status   01/08/2020 35 (H) 8 - 23 mg/dL Final     Creatinine   Date Value Ref Range Status   01/08/2020 1.3 0.5 - 1.4 mg/dL Final   11/14/2012 0.9 0.5 - 1.4 mg/dL Final     Calcium   Date Value Ref Range Status   01/08/2020 11.2 (H) 8.7 - 10.5 mg/dL Final   11/14/2012 9.3 8.7 - 10.5 mg/dL Final     Total Protein   Date Value Ref Range Status   01/07/2020 7.7 6.0 - 8.4 g/dL Final     Albumin   Date Value Ref Range Status   01/07/2020 3.4 (L) 3.5 - 5.2 g/dL Final   10/03/2012 4.1 3.6 - 5.1 g/dL Final     Comment:     Albumin:  THIS TEST WAS PERFORMED AT:  [facility] [address] [address]  NIKKY Strickland MD, PhD     Total Bilirubin   Date Value Ref Range Status   01/07/2020 0.6 0.1 - 1.0 mg/dL Final     Comment:     For infants and newborns,  interpretation of results should be based  on gestational age, weight and in agreement with clinical  observations.  Premature Infant recommended reference ranges:  Up to 24 hours.............<8.0 mg/dL  Up to 48 hours............<12.0 mg/dL  3-5 days..................<15.0 mg/dL  6-29 days.................<15.0 mg/dL       Alkaline Phosphatase   Date Value Ref Range Status   01/07/2020 49 (L) 55 - 135 U/L Final   11/14/2012 57 55 - 135 U/L Final     AST   Date Value Ref Range Status   01/07/2020 17 10 - 40 U/L Final   11/14/2012 28 10 - 40 U/L Final     ALT   Date Value Ref Range Status   01/07/2020 13 10 - 44 U/L Final     Anion Gap   Date Value Ref Range Status   01/08/2020 7 (L) 8 - 16 mmol/L Final   11/14/2012 11 5 - 15 meq/L Final     eGFR if    Date Value Ref Range Status   01/08/2020 >60.0 >60 mL/min/1.73 m^2 Final     eGFR if non    Date Value Ref Range Status   01/08/2020 55.7 (A) >60 mL/min/1.73 m^2 Final     Comment:     Calculation used to obtain the estimated glomerular filtration  rate (eGFR) is the CKD-EPI equation.          Diagnostic Results:  Exam: MRI OF THE LUMBAR SPINE WITH CONTRAST     Clinical data: Primary tumor.     Technique: Multiplanar multisequence MRI of the lumbar spine with contrast.  Axial imaging was performed through the L1 through S1 disc levels.     Prior studies: MRI of the lumbar spine dated 01/06/2020. CT scan of the lumbar  spine dated 01/01/20.     Findings: For the purposes of this examination, spinal levels were labeled  assuming five nonrib-bearing, lumbar-type vertebrae with the inferior labeled  L5. Normal lordotic curvature. No acute fracture or subluxation.     Normal vertebral body and intervertebral disc heights. Normal marrow signal of  the vertebrae. Mild degenerative changes in the bilateral sacroiliac joint and  multilevel facet arthropathy in the lumbar spine from L3-L5 level.     A homogeneously enhancing 1.8 x 1.3 cm  intradural lesion at the level of the L2  vertebra.  Possibility of neurofibroma.     Soft tissues are unremarkable.     Individual spinal levels are described as follows:     T12-L1: No definitive spinal canal or neural foraminal stenosis.     L1-L2: There is no abnormally positioned disc material. There is no significant  spinal canal, lateral recess, neural foramina compromise, or nerve impingement.     L2-L3: There is no abnormally positioned disc material. There is no significant  spinal canal, lateral recess, neural foramina compromise, or nerve impingement.     L3-L4: There is no abnormally positioned disc material. There is no significant  spinal canal, lateral recess, neural foramina compromise, or nerve impingement.     L4-L5: Severe bilateral facet arthropathy.  Broad-based circumferential 4 mm  disc bulge.  Mild narrowing of the bilateral lateral recesses and left neural  foramina.  Abutment of bilateral traversing L5 and exiting L4 nerve root.     L5-S1: There is no abnormally positioned disc material. There is no significant  spinal canal, lateral recess, neural foramina compromise, or nerve impingement.     IMPRESSION:     1. A homogeneously enhancing 1.8 x 1.3 cm intradural lesion at the level of the  L2 vertebra.  Possibility of neurofibroma.     2. L4-L5: Severe bilateral facet arthropathy.  Broad-based circumferential 4 mm  disc bulge.  Mild narrowing of the bilateral lateral recesses and left neural  foramina.  Abutment of bilateral traversing L5 and exiting L4 nerve root.     Impression CT chest abdomen pelvis March 2019       Mild increase in the size and number the masses within the lungs consistent with metastatic disease    Multiple solid-appearing left renal masses although not seen on the prior exam prior study was without IV contrast.  These however not seen earlier chest CT of 1/23/2017.  Differential includes infectious/inflammatory process such as lobar nephronia, metastatic disease to  the kidney, primary renal cell carcinomas    Also interval development of necrotic appearing pancreatic mass suspicious for pancreatic cancer       Other MRI images are not available.    Assessment/Plan:     Active Diagnoses:    Diagnosis Date Noted POA    PRINCIPAL PROBLEM:  Chronic midline low back pain [M54.5, G89.29] 01/07/2020 Yes    CLEMENTE (acute kidney injury) [N17.9] 01/07/2020 Yes    Renal cell carcinoma [C64.9] 02/18/2019 Yes      Problems Resolved During this Admission:       Untreated metastatic renal cell CA patient wants to continue to enjoy quality of life without therapy  Pancreatic head mass that has not been biopsied suggestive of pancreatic malignancy again patient wants no treatment  MRI findings of neurofibroma which seems to be causing acute pain and discomfort to patient along with other findings of L4-L5 disc bulging patient will probably benefit from neurosurgical evaluation at the only really want pain palliation for patient to return to his usual functions I have discussed this with hospitalist patient has a possible transfer for neurosurgical evaluation upon discharge upon need of hospice patient will contact Dr. Anna office for assistance        Wendy Tinajero MD  Hematology/Oncology  [facility]

## 2020-01-08 NOTE — SUBJECTIVE & OBJECTIVE
Interval History: he had an MRI with contrast yesterday. He states his pain is not well controlled, he is on dilaudid 0.5mg q4hrs which he says just takes the edge off the pain minimally. He denies perineal anesthesia, has no loss of bowel or bladder function.    Review of Systems   HENT: Negative for congestion and sore throat.    Cardiovascular: Negative for chest pain and palpitations.   Gastrointestinal: Negative for abdominal pain, constipation, diarrhea, nausea and vomiting.   Genitourinary: Negative for difficulty urinating, dysuria, hematuria and urgency.   Musculoskeletal: Positive for back pain.   Skin: Negative for rash.   Neurological: Negative for numbness.     Objective:     Vital Signs (Most Recent):  Temp: 98.3 °F (36.8 °C) (01/08/20 0732)  Pulse: 91 (01/08/20 0732)  Resp: 18 (01/08/20 0732)  BP: (!) 158/83 (01/08/20 0732)  SpO2: 95 % (01/08/20 0732) Vital Signs (24h Range):  Temp:  [97.7 °F (36.5 °C)-98.7 °F (37.1 °C)] 98.3 °F (36.8 °C)  Pulse:  [] 91  Resp:  [16-21] 18  SpO2:  [94 %-98 %] 95 %  BP: (122-162)/(62-83) 158/83     Weight: 68 kg (149 lb 14.6 oz)  Body mass index is 24.95 kg/m².  No intake or output data in the 24 hours ending 01/08/20 1112   Physical Exam   Constitutional: He is oriented to person, place, and time. He appears well-developed and well-nourished. No distress.   HENT:   Head: Normocephalic and atraumatic.   Mouth/Throat: Oropharynx is clear and moist. No oropharyngeal exudate.   Eyes: Pupils are equal, round, and reactive to light. EOM are normal. No scleral icterus.   Neck: No thyromegaly present.   Cardiovascular: Normal rate, regular rhythm and normal heart sounds.   No murmur heard.  Pulmonary/Chest: Effort normal and breath sounds normal. No respiratory distress. He has no wheezes. He has no rales.   Abdominal: Soft. Bowel sounds are normal. He exhibits no distension. There is no tenderness. No hernia.   Musculoskeletal: Normal range of motion. He exhibits  tenderness (lower lumbar tenderness). He exhibits no edema.   Positive straight leg test.      Lymphadenopathy:     He has no cervical adenopathy.   Neurological: He is alert and oriented to person, place, and time. He displays normal reflexes. No cranial nerve deficit.   Skin: Skin is warm. Capillary refill takes less than 2 seconds. No rash noted.   Psychiatric: He has a normal mood and affect.   Nursing note and vitals reviewed.      Significant Labs:   BMP:   Recent Labs   Lab 01/08/20  0443   *      K 3.8      CO2 26   BUN 35*   CREATININE 1.3   CALCIUM 11.2*     CBC:   Recent Labs   Lab 01/07/20  1807 01/08/20  0443   WBC 7.47 8.75   HGB 11.6* 11.3*   HCT 36.4* 36.3*    267     Magnesium: No results for input(s): MG in the last 48 hours.    Significant Imaging: I have reviewed all pertinent imaging results/findings within the past 24 hours.     Imaging Results          MRI Lumbar Spine With Contrast (Final result)  Result time 01/07/20 21:05:29    Final result by Toya Dubose MD (01/07/20 21:05:29)                 Narrative:        Exam: MRI OF THE LUMBAR SPINE WITH CONTRAST    Clinical data: Primary tumor.    Technique: Multiplanar multisequence MRI of the lumbar spine with contrast.  Axial imaging was performed through the L1 through S1 disc levels.    Prior studies: MRI of the lumbar spine dated 01/06/2020. CT scan of the lumbar  spine dated 01/01/20.    Findings: For the purposes of this examination, spinal levels were labeled  assuming five nonrib-bearing, lumbar-type vertebrae with the inferior labeled  L5. Normal lordotic curvature. No acute fracture or subluxation.    Normal vertebral body and intervertebral disc heights. Normal marrow signal of  the vertebrae. Mild degenerative changes in the bilateral sacroiliac joint and  multilevel facet arthropathy in the lumbar spine from L3-L5 level.    A homogeneously enhancing 1.8 x 1.3 cm intradural lesion at the level of the  L2  vertebra.  Possibility of neurofibroma.    Soft tissues are unremarkable.    Individual spinal levels are described as follows:    T12-L1: No definitive spinal canal or neural foraminal stenosis.    L1-L2: There is no abnormally positioned disc material. There is no significant  spinal canal, lateral recess, neural foramina compromise, or nerve impingement.    L2-L3: There is no abnormally positioned disc material. There is no significant  spinal canal, lateral recess, neural foramina compromise, or nerve impingement.    L3-L4: There is no abnormally positioned disc material. There is no significant  spinal canal, lateral recess, neural foramina compromise, or nerve impingement.    L4-L5: Severe bilateral facet arthropathy.  Broad-based circumferential 4 mm  disc bulge.  Mild narrowing of the bilateral lateral recesses and left neural  foramina.  Abutment of bilateral traversing L5 and exiting L4 nerve root.    L5-S1: There is no abnormally positioned disc material. There is no significant  spinal canal, lateral recess, neural foramina compromise, or nerve impingement.    IMPRESSION:    1. A homogeneously enhancing 1.8 x 1.3 cm intradural lesion at the level of the  L2 vertebra.  Possibility of neurofibroma.    2. L4-L5: Severe bilateral facet arthropathy.  Broad-based circumferential 4 mm  disc bulge.  Mild narrowing of the bilateral lateral recesses and left neural  foramina.  Abutment of bilateral traversing L5 and exiting L4 nerve root.    Other MRI images are not available.    Recommendation: Follow up as clinically indicated.          Electronically Signed by PHILIP ARVIZU MD at 1/7/2020 11:04:45 PM

## 2020-01-08 NOTE — SUBJECTIVE & OBJECTIVE
Past Medical History:   Diagnosis Date    GERD (gastroesophageal reflux disease)     Renal cell carcinoma        Past Surgical History:   Procedure Laterality Date    APPENDECTOMY      HERNIA REPAIR      NEPHRECTOMY         Review of patient's allergies indicates:   Allergen Reactions    Other Nausea And Vomiting     SODIUM PENATHOL  CAUSES SEVERE N & V       No current facility-administered medications on file prior to encounter.      Current Outpatient Medications on File Prior to Encounter   Medication Sig    finasteride (PROSCAR) 5 mg tablet Take 5 mg by mouth once daily.    gabapentin (NEURONTIN) 300 MG capsule Take 600 mg by mouth 2 (two) times daily.    HYDROcodone-acetaminophen (NORCO)  mg per tablet Take 1 tablet by mouth every 8 (eight) hours as needed for Pain.    ibuprofen (ADVIL,MOTRIN) 600 MG tablet Take 1 tablet (600 mg total) by mouth every 6 (six) hours as needed for Pain.    methocarbamol (ROBAXIN) 500 MG Tab Take 2 tablets (1,000 mg total) by mouth 3 (three) times daily. for 5 days    pantoprazole (PROTONIX) 40 MG tablet Take 1 tablet (40 mg total) by mouth once daily.    predniSONE (DELTASONE) 20 MG tablet Take 40 mg by mouth once daily. For 5 days    tamsulosin (FLOMAX) 0.4 mg Cap Take 0.4 mg by mouth once daily.    terbinafine HCl (LAMISIL) 250 mg tablet Take 250 mg by mouth once daily. For 10 days. Pt takes from 12th  of month to the 22 nd as per pt    saw palmetto frt/phytosterol 2 (PROSTATE SR) 160-250 mg Cap Take 1 capsule by mouth 2 (two) times daily.    [DISCONTINUED] finasteride (PROSCAR) 5 mg tablet TAKE ONE TABLET BY MOUTH DAILY (Patient taking differently: Take 5 mg by mouth. )    [DISCONTINUED] tamsulosin (FLOMAX) 0.4 mg Cap TAKE ONE CAPSULE BY MOUTH ONCE DAILY (Patient taking differently: 0.4 mg. )     Family History     None        Tobacco Use    Smoking status: Never Smoker   Substance and Sexual Activity    Alcohol use: No    Drug use: No    Sexual  activity: Not on file     Review of Systems   Constitutional: Negative for chills and diaphoresis.   HENT: Negative for congestion and sore throat.    Cardiovascular: Negative for chest pain and palpitations.   Gastrointestinal: Negative for abdominal pain, constipation, diarrhea, nausea and vomiting.   Genitourinary: Negative for difficulty urinating, dysuria, flank pain, hematuria and urgency.   Musculoskeletal: Positive for back pain.   Skin: Negative for rash.     Objective:     Vital Signs (Most Recent):  Temp: 98.7 °F (37.1 °C) (01/07/20 1728)  Pulse: 85 (01/07/20 1808)  Resp: (!) 21 (01/07/20 1808)  BP: (!) 147/71 (01/07/20 1800)  SpO2: 97 % (01/07/20 1808) Vital Signs (24h Range):  Temp:  [98.2 °F (36.8 °C)-98.7 °F (37.1 °C)] 98.7 °F (37.1 °C)  Pulse:  [80-96] 85  Resp:  [16-21] 21  SpO2:  [94 %-98 %] 97 %  BP: (122-147)/(62-80) 147/71     Weight: 68 kg (150 lb)  Body mass index is 24.96 kg/m².    Physical Exam   Constitutional: He is oriented to person, place, and time. He appears well-developed and well-nourished. No distress.   HENT:   Head: Normocephalic and atraumatic.   Mouth/Throat: Oropharynx is clear and moist. No oropharyngeal exudate.   Eyes: Pupils are equal, round, and reactive to light. EOM are normal. No scleral icterus.   Neck: No thyromegaly present.   Cardiovascular: Normal rate, regular rhythm and normal heart sounds.   No murmur heard.  Pulmonary/Chest: Effort normal and breath sounds normal. No respiratory distress. He has no wheezes. He has no rales.   Abdominal: Soft. Bowel sounds are normal. He exhibits no distension. There is no tenderness. No hernia.   Musculoskeletal: Normal range of motion. He exhibits tenderness (lower lumbar tenderness). He exhibits no edema.   Positive straight leg test.      Lymphadenopathy:     He has no cervical adenopathy.   Neurological: He is alert and oriented to person, place, and time. He displays normal reflexes. No cranial nerve deficit.   Skin: Skin  is warm. Capillary refill takes less than 2 seconds. No rash noted.   Psychiatric: He has a normal mood and affect.   Nursing note and vitals reviewed.        CRANIAL NERVES     CN III, IV, VI   Pupils are equal, round, and reactive to light.  Extraocular motions are normal.        Significant Labs:   BMP:   Recent Labs   Lab 01/06/20  1534   BUN 26*   CREATININE 1.5*     CBC: No results for input(s): WBC, HGB, HCT, PLT in the last 48 hours.  Magnesium: No results for input(s): MG in the last 48 hours.    Significant Imaging: I have reviewed all pertinent imaging results/findings within the past 24 hours.     Imaging Results    None

## 2020-01-08 NOTE — PROGRESS NOTES
01/08/20 1504   Post-Acute Status   Post-Acute Authorization Placement     Pt pending transfer  for neuro surgery eval. CM will follow.

## 2020-01-09 PROBLEM — E83.52 HYPERCALCEMIA OF MALIGNANCY: Status: ACTIVE | Noted: 2020-01-01

## 2020-01-09 PROBLEM — C64.9 METASTATIC RENAL CELL CARCINOMA: Status: ACTIVE | Noted: 2019-02-18

## 2020-01-09 PROBLEM — Z01.818 PREOPERATIVE CLEARANCE: Status: ACTIVE | Noted: 2020-01-01

## 2020-01-09 NOTE — SUBJECTIVE & OBJECTIVE
Past Medical History:   Diagnosis Date    GERD (gastroesophageal reflux disease)     Renal cell carcinoma        Past Surgical History:   Procedure Laterality Date    APPENDECTOMY      HERNIA REPAIR      NEPHRECTOMY         Review of patient's allergies indicates:   Allergen Reactions    Other Nausea And Vomiting     SODIUM PENATHOL  CAUSES SEVERE N & V       Current Facility-Administered Medications on File Prior to Encounter   Medication    [DISCONTINUED] 0.9%  NaCl infusion    [DISCONTINUED] finasteride tablet 5 mg    [DISCONTINUED] gabapentin capsule 600 mg    [DISCONTINUED] HYDROmorphone injection 1 mg    [DISCONTINUED] lorazepam injection 0.5 mg    [DISCONTINUED] methocarbamol tablet 1,000 mg    [DISCONTINUED] methylPREDNISolone sodium succinate injection 40 mg    [DISCONTINUED] oxyCODONE-acetaminophen 5-325 mg per tablet 1 tablet    [DISCONTINUED] pantoprazole EC tablet 40 mg    [DISCONTINUED] polyethylene glycol packet 17 g    [DISCONTINUED] tamsulosin 24 hr capsule 0.4 mg     Current Outpatient Medications on File Prior to Encounter   Medication Sig    finasteride (PROSCAR) 5 mg tablet Take 5 mg by mouth once daily.    gabapentin (NEURONTIN) 300 MG capsule Take 600 mg by mouth 2 (two) times daily.    HYDROcodone-acetaminophen (NORCO)  mg per tablet Take 1 tablet by mouth every 8 (eight) hours as needed for Pain.    ibuprofen (ADVIL,MOTRIN) 600 MG tablet Take 1 tablet (600 mg total) by mouth every 6 (six) hours as needed for Pain.    pantoprazole (PROTONIX) 40 MG tablet Take 1 tablet (40 mg total) by mouth once daily.    predniSONE (DELTASONE) 20 MG tablet Take 40 mg by mouth once daily. For 5 days    saw palmetto frt/phytosterol 2 (PROSTATE SR) 160-250 mg Cap Take 1 capsule by mouth 2 (two) times daily.    tamsulosin (FLOMAX) 0.4 mg Cap Take 0.4 mg by mouth once daily.    terbinafine HCl (LAMISIL) 250 mg tablet Take 250 mg by mouth once daily. For 10 days. Pt takes from 12th   of month to the 22 nd as per pt     Family History     None        Tobacco Use    Smoking status: Never Smoker   Substance and Sexual Activity    Alcohol use: No    Drug use: No    Sexual activity: Not on file     Review of Systems   Constitutional: Negative for activity change, appetite change, fatigue and fever.   HENT: Negative for congestion and facial swelling.    Respiratory: Negative for cough, shortness of breath, wheezing and stridor.    Cardiovascular: Negative for chest pain, palpitations and leg swelling.   Gastrointestinal: Negative for abdominal pain, diarrhea, nausea and vomiting.   Endocrine: Negative.    Genitourinary: Negative for decreased urine volume, dysuria, enuresis, hematuria and urgency.   Musculoskeletal: Positive for back pain.   Skin: Negative.  Negative for rash.   Neurological: Positive for weakness. Negative for tremors, speech difficulty and numbness.     Objective:     Vital Signs (Most Recent):  Temp: 97.3 °F (36.3 °C) (01/09/20 1237)  Pulse: 99 (01/09/20 1237)  Resp: 18 (01/09/20 1237)  BP: 119/72 (01/09/20 1237)  SpO2: (!) 94 % (01/09/20 1237) Vital Signs (24h Range):  Temp:  [97.2 °F (36.2 °C)-98.2 °F (36.8 °C)] 97.3 °F (36.3 °C)  Pulse:  [71-99] 99  Resp:  [15-18] 18  SpO2:  [94 %-98 %] 94 %  BP: (119-180)/(64-86) 119/72     Weight: 68 kg (149 lb 14.6 oz)  Body mass index is 24.95 kg/m².    Physical Exam   Constitutional: He is oriented to person, place, and time. He appears well-developed and well-nourished. No distress.   HENT:   Head: Normocephalic and atraumatic.   Mouth/Throat: Oropharynx is clear and moist.   Eyes: Pupils are equal, round, and reactive to light.   Neck: Normal range of motion. Neck supple. No JVD present.   Cardiovascular: Normal rate, regular rhythm and normal heart sounds.   No murmur heard.  Pulmonary/Chest: Effort normal and breath sounds normal. No respiratory distress. He has no wheezes. He has no rales.   Abdominal: Soft. Bowel sounds are  normal. He exhibits no distension. There is no tenderness.   Musculoskeletal: Normal range of motion. He exhibits no edema or deformity.   Neurological: He is alert and oriented to person, place, and time. No sensory deficit. He exhibits normal muscle tone.   strenght 4/5 in LEs and 5/5 in upper extremities   Skin: Skin is warm.   Vitals reviewed.      Significant Labs:   BMP:   Recent Labs   Lab 01/09/20  0433   *      K 4.0      CO2 24   BUN 28*   CREATININE 1.4   CALCIUM 12.0*     CBC:   Recent Labs   Lab 01/07/20 1807 01/08/20 0443 01/09/20 0433   WBC 7.47 8.75 8.96   HGB 11.6* 11.3* 11.6*   HCT 36.4* 36.3* 37.8*    267 262     CMP:   Recent Labs   Lab 01/07/20 1807 01/08/20 0443 01/09/20  0433    141 138   K 4.6 3.8 4.0    108 106   CO2 25 26 24   * 143* 113*   BUN 38* 35* 28*   CREATININE 1.5* 1.3 1.4   CALCIUM 12.2* 11.2* 12.0*   PROT 7.7  --   --    ALBUMIN 3.4*  --   --    BILITOT 0.6  --   --    ALKPHOS 49*  --   --    AST 17  --   --    ALT 13  --   --    ANIONGAP 9 7* 8   EGFRNONAA 46.8* 55.7* 50.9*       Significant Imaging: I have reviewed and interpreted all pertinent imaging results/findings within the past 24 hours.

## 2020-01-09 NOTE — PLAN OF CARE
"Patient seen and re-examined. Witnessed patient ambulate from stretcher in ubtt to bed in room with significant pain.  He reports 1 month history of back and lower extremity radicular pain from back down posterior aspect of legs to the feet bilaterally.  No inciting events.  He reports back pain is worse than leg pain. He has a hx of metastatic RCC (diffuse bony mets, pulmonary mets) and new pancreatic mass that has not responded to chemotherapy. At his last visit with his oncologist, Dr. Anna, 3/8/2019 he elected not to have a biopsy done of the pancreatic mass to determine if it is pancreatic cancer. He elected to stop chemotherapy and end of life counseling/hospice was discussed. Per Dr. Anna's note, "The patient communicated that if he were comatose and had little chance of a meaningful recovery, he would not want machines/life-sustaining treatments used." The patient and family members at bedside inquired about back surgery for L4-5 disc. He reports the back pain is debilitating and affecting his ability to care for himself. He denies numbness, paresthesias, bowel or bladder dysfunction. Pain limited weakness (4+/5) of BLE hip flexion and knee flexion/extension otherwise full strength on exam. Discussed goals of surgical intervention for L2 intradural mass. Documented risk assessment by the [employer] found in Consult note dated 1/9/2019. Appreciate management of hypercalcemia. Consult placed to heme/onc for prognostication and to palliative care for goals of care discussion. MRI C/T spine w contrast reviewed. No enhancing intradural or bony metastasis noted within the cervical or thoracic spine, no significant canal compromise or cord signal changes. Intramuscular lesion noted in right paraspinal muscles of cervical region. Discussed with Dr. Freedman. Further recommendations to follow discussion between all coordinators of care team. Discussed with patient and family at bedside, all in agreement " to plan. All questions answered.     Tonie Chopra PA-C  Pager: [phone]  Neurosurgery  [facility]

## 2020-01-09 NOTE — DISCHARGE SUMMARY
[facility] Medicine  Discharge Summary      Patient Name: Raymundo Richard  MRN: [id number]  Admission Date: 1/7/2020  Hospital Length of Stay: 0 days  Discharge Date and Time:  01/08/2020 6:37 PM  Attending Physician: Jayne Spears MD   Discharging Provider: Jayne Spears MD  Primary Care Provider: Sekou Lara III, MD      HPI:   69-year-old male with past medical history of renal cell carcinoma status post right nephrectomy in 2013 who presents with complaints of back pain. The patient states that the pain has been present for about a month that has increasingly gotten worse over the past few days.  The pain is severe, 10/10 in intensity radiating down both his legs.  He denies any loss of bowel or bladder function, any saddle anesthesia.  He denies bloody urine, fever, chills or night sweats.  He denies dysuria.    The patient states that after his nephrectomy in 2013 6 months later he had pulmonary nodules that were found and when I eventually biopsied in 2018 pathology of the biopsy was consistent with clear cell renal carcinoma.  He states that since then the cancer had spread to his pancreas and bones.  He had a CT scan done in March 2019 which showed a progression of the pulmonary Mets with a new pancreatic mass and some osseous bone metastasis.  He has tried to pursue therapy at [facility] and had been on sorafenib.      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 He had an oncology consult done and it was determined that he would need neurosurgical evaluations. Plans were made to transfer him to [facility] for further care.      Consults:   Consults (From admission, onward)        Status Ordering Provider     Inpatient consult to Hematology Oncology  Once     Provider:  [facility] OLUBUNMI O.     Inpatient consult to Hematology Oncology  Once      Provider:  Nikia Anna MD    Completed JESSICA SPEARS     Inpatient consult to Hospitalist  Once     Provider:  Tejas Domínguez MD    Acknowledged JESSICA SPEARS          No new Assessment & Plan notes have been filed under this hospital service since the last note was generated.  Service: Hospital Medicine    Final Active Diagnoses:    Diagnosis Date Noted POA    PRINCIPAL PROBLEM:  Chronic midline low back pain [M54.5, G89.29] 01/07/2020 Yes    Renal cell carcinoma [C64.9] 02/18/2019 Yes    CLEMENTE (acute kidney injury) [N17.9] 01/07/2020 Yes      Problems Resolved During this Admission:       Discharged Condition: fair    Disposition: Another Health Care Inst*    Follow Up:    Patient Instructions:   No discharge procedures on file.    Significant Diagnostic Studies: Labs:   BMP:   Recent Labs   Lab 01/07/20  1807 01/08/20  0443   * 143*    141   K 4.6 3.8    108   CO2 25 26   BUN 38* 35*   CREATININE 1.5* 1.3   CALCIUM 12.2* 11.2*    and CBC   Recent Labs   Lab 01/07/20 1807 01/08/20  0443   WBC 7.47 8.75   HGB 11.6* 11.3*   HCT 36.4* 36.3*    267       Pending Diagnostic Studies:     Procedure Component Value Units Date/Time    Urinalysis, Reflex to Urine Culture Urine, Clean Catch [[id number]] Collected:  01/08/20 0300    Order Status:  Sent Lab Status:  In process Updated:  01/08/20 0301    Specimen:  Urine     Urinalysis, Reflex to Urine Culture Urine, Clean Catch [[id number]] Collected:  01/08/20 0300    Order Status:  Sent Lab Status:  In process Updated:  01/08/20 0301    Specimen:  Urine          Medications:  Transfer Medications (for Discharge Readmit only):   Current Facility-Administered Medications   Medication Dose Route Frequency Provider Last Rate Last Dose    0.9%  NaCl infusion   Intravenous Continuous Jessica Spears  mL/hr at 01/07/20 2215      finasteride tablet 5 mg  5 mg Oral Daily Jessica Spears MD   5 mg at 01/08/20 0813     gabapentin capsule 600 mg  600 mg Oral BID Jayne Spears MD   600 mg at 01/08/20 0812    HYDROmorphone injection 1 mg  1 mg Intravenous Q4H PRN Jayne Spears MD   1 mg at 01/08/20 1611    lorazepam injection 0.5 mg  0.5 mg Intravenous Once Jayne Spears MD        methocarbamol tablet 1,000 mg  1,000 mg Oral TID Jayne Spears MD        methylPREDNISolone sodium succinate injection 40 mg  40 mg Intravenous BID Jayne Spears MD   40 mg at 01/08/20 1139    oxyCODONE-acetaminophen 5-325 mg per tablet 1 tablet  1 tablet Oral Q6H PRN Jayne Spears MD        pantoprazole EC tablet 40 mg  40 mg Oral Daily Jayne Spears MD   40 mg at 01/08/20 0812    polyethylene glycol packet 17 g  17 g Oral Daily Jayne Spears MD   17 g at 01/08/20 0812    tamsulosin 24 hr capsule 0.4 mg  0.4 mg Oral Daily Jayne Spears MD   0.4 mg at 01/08/20 0812       Indwelling Lines/Drains at time of discharge:   Lines/Drains/Airways     None                 Time spent on the discharge of patient: 45 minutes  Patient was seen and examined on the date of discharge and determined to be suitable for discharge.         Jayne Spears MD  Department of Hospital Medicine  [facility]

## 2020-01-09 NOTE — ASSESSMENT & PLAN NOTE
68 yo male patient with history of stage IV RCC s/p right Nephrectomy in 2013, with metastasis to lung found on lung biopsy in 08/2018, and further extension to to pancreas and bones noticed on CT from March 4 2019, coming as a transfer from [facility] for NSGY evaluation due to L2 spinal lesion found on MRI, suspicious for metastasis vs neurofibroma. Hospital Medicine was consulted for surgical clearance. Patient denies any history of DM, HTN, strokes, CAD, PAD, COPD or any comorbidity other than BPH treated with flomax. He lives by himself and is independent for ADLS. No sx/sx of heart failure. No EKG or prior echos in chart.    Plan:   - Surgical risk assessed. See image below.

## 2020-01-09 NOTE — ASSESSMENT & PLAN NOTE
Patient does have hypercalcemia most likely from malignancy in the setting of metastatic RCC that has been present since 11/2019 per chart review. Ca on admissino 12 (corrected for albumin at 12.5). No symptoms    Plan:   - Start NS at 125 ml/hour   - Zometa 4 mg IV once.

## 2020-01-09 NOTE — ASSESSMENT & PLAN NOTE
Pt is a 69yom with pmh RCC s/p nephrectomy 2013 now with known pulmonary metastasis who presented to outside hospital with acutely worsening back pain with spells of tussive lower extremity pain and weakness. MRI revealed a homogenously enhancing lesion in the lumbar spine. Pt transferred for neurosurgical evaluation.    Plan:  Admit to neurosurgery  Will obtain complete spinal MRI to assess for other possible lesions  Medicine consult to clear for surgery  Regular diet at this time  Pain management  Further recommendations to follow  Please contact with any further questions

## 2020-01-09 NOTE — HPI
Pt is a 69yom with pmh RCC s/p nephrectomy 2013 now with known pulmonary metastasis who presented to outside hospital with acutely worsening back pain with spells of tussive lower extremity pain and weakness. MRI revealed a homogenously enhancing lesion in the lumbar spine. Pt transferred for neurosurgical evaluation.

## 2020-01-09 NOTE — SUBJECTIVE & OBJECTIVE
Medications Prior to Admission   Medication Sig Dispense Refill Last Dose    finasteride (PROSCAR) 5 mg tablet Take 5 mg by mouth once daily.   2020 at 09:00    gabapentin (NEURONTIN) 300 MG capsule Take 600 mg by mouth 2 (two) times daily.       HYDROcodone-acetaminophen (NORCO)  mg per tablet Take 1 tablet by mouth every 8 (eight) hours as needed for Pain. 90 tablet 0 2020 at 09:00    ibuprofen (ADVIL,MOTRIN) 600 MG tablet Take 1 tablet (600 mg total) by mouth every 6 (six) hours as needed for Pain. 20 tablet 0 Past Week at Unknown time    [] methocarbamol (ROBAXIN) 500 MG Tab Take 2 tablets (1,000 mg total) by mouth 3 (three) times daily. for 5 days 30 tablet 0 Past Month at Unknown time    pantoprazole (PROTONIX) 40 MG tablet Take 1 tablet (40 mg total) by mouth once daily. 90 tablet 3 2020 at 09:00    predniSONE (DELTASONE) 20 MG tablet Take 40 mg by mouth once daily. For 5 days   2020 at 09:00    saw palmetto frt/phytosterol 2 (PROSTATE SR) 160-250 mg Cap Take 1 capsule by mouth 2 (two) times daily.   More than a month at Unknown time    tamsulosin (FLOMAX) 0.4 mg Cap Take 0.4 mg by mouth once daily.   2020 at 09:00    terbinafine HCl (LAMISIL) 250 mg tablet Take 250 mg by mouth once daily. For 10 days. Pt takes from   of month to the  as per pt   2019       Review of patient's allergies indicates:   Allergen Reactions    Other Nausea And Vomiting     SODIUM PENATHOL  CAUSES SEVERE N & V       Past Medical History:   Diagnosis Date    GERD (gastroesophageal reflux disease)     Renal cell carcinoma      Past Surgical History:   Procedure Laterality Date    APPENDECTOMY      HERNIA REPAIR      NEPHRECTOMY       Family History     None        Tobacco Use    Smoking status: Never Smoker   Substance and Sexual Activity    Alcohol use: No    Drug use: No    Sexual activity: Not on file     Review of Systems   Constitutional: Negative for activity  change, appetite change, fatigue, fever and unexpected weight change.   Respiratory: Negative for shortness of breath.    Cardiovascular: Negative for chest pain.   Musculoskeletal: Positive for back pain and gait problem. Negative for neck pain.   Neurological: Positive for weakness. Negative for dizziness, tremors, seizures, syncope, facial asymmetry, speech difficulty, light-headedness, numbness and headaches.     Objective:     Weight: 68 kg (149 lb 14.6 oz)  Body mass index is 24.95 kg/m².  Vital Signs (Most Recent):  Temp: 97.6 °F (36.4 °C) (01/09/20 0445)  Pulse: 71 (01/09/20 0445)  Resp: 15 (01/09/20 0445)  BP: (!) 154/73 (01/09/20 0445)  SpO2: 95 % (01/09/20 0445) Vital Signs (24h Range):  Temp:  [97.2 °F (36.2 °C)-98.3 °F (36.8 °C)] 97.6 °F (36.4 °C)  Pulse:  [71-95] 71  Resp:  [15-18] 15  SpO2:  [94 %-98 %] 95 %  BP: (126-180)/(64-86) 154/73                          Physical Exam:  Nursing note and vitals reviewed.    Cardiovascular: Normal rate.     Abdominal: Soft.     Neurological:   E4V5M6  AOX3  PERRL, CN II-XII grossly intact  Stength 5/5 bilateral upper extremities  Pain limited 4/5 diffusely in bilateral lower extremities. No obvious deficit.  SILT       Significant Labs:  Recent Labs   Lab 01/07/20 1807 01/08/20 0443   * 143*    141   K 4.6 3.8    108   CO2 25 26   BUN 38* 35*   CREATININE 1.5* 1.3   CALCIUM 12.2* 11.2*     Recent Labs   Lab 01/07/20 1807 01/08/20 0443   WBC 7.47 8.75   HGB 11.6* 11.3*   HCT 36.4* 36.3*    267     Recent Labs   Lab 01/07/20 1807   LABPT 14.8   INR 1.2   APTT 36.6*     Microbiology Results (last 7 days)     ** No results found for the last 168 hours. **        All pertinent labs from the last 24 hours have been reviewed.    Significant Diagnostics:  MRI L spine  1. A homogeneously enhancing 1.8 x 1.3 cm intradural lesion at the level of the  L2 vertebra.  Possibility of neurofibroma.    2. L4-L5: Severe bilateral facet arthropathy.   Broad-based circumferential 4 mm  disc bulge.  Mild narrowing of the bilateral lateral recesses and left neural  foramina.  Abutment of bilateral traversing L5 and exiting L4 nerve root.  I have reviewed all pertinent imaging results/findings within the past 24 hours.

## 2020-01-09 NOTE — H&P
[facility]  Neuorsurgery  History and Physical     Patient Name: Raymundo Richard  MRN: [id number]  Admission Date: 2020  Attending Physician: Hernandez Freedman MD   Primary Care Physician: Sekou Lara III, MD    Patient information was obtained from patient and spouse/SO.     Subjective:     Chief Complaint/Reason for Admission: back pain     HPI:  Pt is a 69yom with pmh RCC s/p nephrectomy 2013 now with known pulmonary metastasis who presented to outside hospital with acutely worsening back pain with spells of tussive lower extremity pain and weakness. MRI revealed a homogenously enhancing lesion in the lumbar spine. Pt transferred for neurosurgical evaluation.    Medications Prior to Admission   Medication Sig Dispense Refill Last Dose    finasteride (PROSCAR) 5 mg tablet Take 5 mg by mouth once daily.   2020 at 09:00    gabapentin (NEURONTIN) 300 MG capsule Take 600 mg by mouth 2 (two) times daily.       HYDROcodone-acetaminophen (NORCO)  mg per tablet Take 1 tablet by mouth every 8 (eight) hours as needed for Pain. 90 tablet 0 2020 at 09:00    ibuprofen (ADVIL,MOTRIN) 600 MG tablet Take 1 tablet (600 mg total) by mouth every 6 (six) hours as needed for Pain. 20 tablet 0 Past Week at Unknown time    [] methocarbamol (ROBAXIN) 500 MG Tab Take 2 tablets (1,000 mg total) by mouth 3 (three) times daily. for 5 days 30 tablet 0 Past Month at Unknown time    pantoprazole (PROTONIX) 40 MG tablet Take 1 tablet (40 mg total) by mouth once daily. 90 tablet 3 2020 at 09:00    predniSONE (DELTASONE) 20 MG tablet Take 40 mg by mouth once daily. For 5 days   2020 at 09:00    saw palmetto frt/phytosterol 2 (PROSTATE SR) 160-250 mg Cap Take 1 capsule by mouth 2 (two) times daily.   More than a month at Unknown time    tamsulosin (FLOMAX) 0.4 mg Cap Take 0.4 mg by mouth once daily.   2020 at 09:00    terbinafine HCl (LAMISIL) 250 mg tablet Take 250 mg by mouth once  daily. For 10 days. Pt takes from 12th  of month to the 22 nd as per pt   12/22/2019       Review of patient's allergies indicates:   Allergen Reactions    Other Nausea And Vomiting     SODIUM PENATHOL  CAUSES SEVERE N & V       Past Medical History:   Diagnosis Date    GERD (gastroesophageal reflux disease)     Renal cell carcinoma      Past Surgical History:   Procedure Laterality Date    APPENDECTOMY      HERNIA REPAIR      NEPHRECTOMY       Family History     None        Tobacco Use    Smoking status: Never Smoker   Substance and Sexual Activity    Alcohol use: No    Drug use: No    Sexual activity: Not on file     Review of Systems   Constitutional: Negative for activity change, appetite change, fatigue, fever and unexpected weight change.   Respiratory: Negative for shortness of breath.    Cardiovascular: Negative for chest pain.   Musculoskeletal: Positive for back pain and gait problem. Negative for neck pain.   Neurological: Positive for weakness. Negative for dizziness, tremors, seizures, syncope, facial asymmetry, speech difficulty, light-headedness, numbness and headaches.     Objective:     Weight: 68 kg (149 lb 14.6 oz)  Body mass index is 24.95 kg/m².  Vital Signs (Most Recent):  Temp: 97.6 °F (36.4 °C) (01/09/20 0445)  Pulse: 71 (01/09/20 0445)  Resp: 15 (01/09/20 0445)  BP: (!) 154/73 (01/09/20 0445)  SpO2: 95 % (01/09/20 0445) Vital Signs (24h Range):  Temp:  [97.2 °F (36.2 °C)-98.3 °F (36.8 °C)] 97.6 °F (36.4 °C)  Pulse:  [71-95] 71  Resp:  [15-18] 15  SpO2:  [94 %-98 %] 95 %  BP: (126-180)/(64-86) 154/73                          Physical Exam:  Nursing note and vitals reviewed.    Cardiovascular: Normal rate.     Abdominal: Soft.     Neurological:   E4V5M6  AOX3  PERRL, CN II-XII grossly intact  Stength 5/5 bilateral upper extremities  Pain limited 4/5 diffusely in bilateral lower extremities. No obvious deficit.  SILT       Significant Labs:  Recent Labs   Lab 01/07/20  9808  01/08/20  0443   * 143*    141   K 4.6 3.8    108   CO2 25 26   BUN 38* 35*   CREATININE 1.5* 1.3   CALCIUM 12.2* 11.2*     Recent Labs   Lab 01/07/20 1807 01/08/20  0443   WBC 7.47 8.75   HGB 11.6* 11.3*   HCT 36.4* 36.3*    267     Recent Labs   Lab 01/07/20 1807   LABPT 14.8   INR 1.2   APTT 36.6*     Microbiology Results (last 7 days)     ** No results found for the last 168 hours. **        All pertinent labs from the last 24 hours have been reviewed.    Significant Diagnostics:  MRI L spine  1. A homogeneously enhancing 1.8 x 1.3 cm intradural lesion at the level of the  L2 vertebra.  Possibility of neurofibroma.    2. L4-L5: Severe bilateral facet arthropathy.  Broad-based circumferential 4 mm  disc bulge.  Mild narrowing of the bilateral lateral recesses and left neural  foramina.  Abutment of bilateral traversing L5 and exiting L4 nerve root.  I have reviewed all pertinent imaging results/findings within the past 24 hours.    Assessment and Plan:     Back pain  Pt is a 69yom with pmh RCC s/p nephrectomy 2013 now with known pulmonary metastasis who presented to outside hospital with acutely worsening back pain with spells of tussive lower extremity pain and weakness. MRI revealed a homogenously enhancing lesion in the lumbar spine. Pt transferred for neurosurgical evaluation.    Plan:  Admit to neurosurgery  Will obtain complete spinal MRI to assess for other possible lesions  Medicine consult to clear for surgery  Regular diet at this time  Pain management  Further recommendations to follow  Please contact with any further questions        Riley Hatfield MD  Neurosurgery  [facility]

## 2020-01-09 NOTE — ASSESSMENT & PLAN NOTE
"Patient with stage IV RCC (mets to lungs, bone and pancreas) despite therapy with sorafenib. Followed by hem/onc, last seen 03/2019 by Dr Anna per chart review. Per Dr Anna's note at that time " We discussed different extreme health states that he could experience, and reviewed what kind of medical care he would want in those situations.  The patient communicated that if he were comatose and had little chance of a meaningful recovery, he would not want machines/life-sustaining treatments used.   The patient has  completed a living will to reflect these preferences"    Plan  - Recommend Palliative Care consultation.     "

## 2020-01-09 NOTE — CONSULTS
[facility]  Consult Note    Patient Name: Raymundo Richard  MRN: [id number]  Admission Date: 1/8/2020  Hospital Length of Stay: 1 days  Attending Physician: Hernandez Freedman MD   Primary Care Provider: Sekou Lara III, MD           Patient information was obtained from parent, past medical records and ER records.     Consults  Subjective:     Principal Problem: Preoperative clearance    Chief Complaint:   Chief Complaint   Patient presents with    Back Pain        HPI: 68 yo male patient with history of stage IV RCC s/p right Nephrectomy in 2013, with metastasis to lung found on lung biopsy in 08/2018, and further extension to to pancreas and bones noticed on CT from March 4 2019, coming as a transfer from [place] for NSGY evaluation.   He went to the ER in [place] on 1/7 due to a 1 month history of worsening back pain associated with LE weakness, MRI of the spine revealed a hyperintense lesion in T2 concerning for neurofibroma, however, given his metastatic disease he was referred here for further evaluation by NSGY. Hospital Medicine was consulted for surgical clearance.     Patient denies any history of DM, HTN, strokes, CAD, PAD, COPD or any comorbidity other than BPH treated with flomax. He lives by himself and is independent for ADLS. Prior to his back pain and weakness 1 month ago,  he used to do some exercise without limitations. Denies any exertional SOB, chest pain, LE edema. He does mention orthopnea for many years, needing to sleep with some degree of head elevation but states he was told this was from LEONARD. He was prescribed a CPAP many years ago but has not used it in the last 2 years because he has lost weight and feels he does not need it anymore.     Past Medical History:   Diagnosis Date    GERD (gastroesophageal reflux disease)     Renal cell carcinoma        Past Surgical History:   Procedure Laterality Date    APPENDECTOMY      HERNIA REPAIR       NEPHRECTOMY         Review of patient's allergies indicates:   Allergen Reactions    Other Nausea And Vomiting     SODIUM PENATHOL  CAUSES SEVERE N & V       Current Facility-Administered Medications on File Prior to Encounter   Medication    [DISCONTINUED] 0.9%  NaCl infusion    [DISCONTINUED] finasteride tablet 5 mg    [DISCONTINUED] gabapentin capsule 600 mg    [DISCONTINUED] HYDROmorphone injection 1 mg    [DISCONTINUED] lorazepam injection 0.5 mg    [DISCONTINUED] methocarbamol tablet 1,000 mg    [DISCONTINUED] methylPREDNISolone sodium succinate injection 40 mg    [DISCONTINUED] oxyCODONE-acetaminophen 5-325 mg per tablet 1 tablet    [DISCONTINUED] pantoprazole EC tablet 40 mg    [DISCONTINUED] polyethylene glycol packet 17 g    [DISCONTINUED] tamsulosin 24 hr capsule 0.4 mg     Current Outpatient Medications on File Prior to Encounter   Medication Sig    finasteride (PROSCAR) 5 mg tablet Take 5 mg by mouth once daily.    gabapentin (NEURONTIN) 300 MG capsule Take 600 mg by mouth 2 (two) times daily.    HYDROcodone-acetaminophen (NORCO)  mg per tablet Take 1 tablet by mouth every 8 (eight) hours as needed for Pain.    ibuprofen (ADVIL,MOTRIN) 600 MG tablet Take 1 tablet (600 mg total) by mouth every 6 (six) hours as needed for Pain.    pantoprazole (PROTONIX) 40 MG tablet Take 1 tablet (40 mg total) by mouth once daily.    predniSONE (DELTASONE) 20 MG tablet Take 40 mg by mouth once daily. For 5 days    saw palmetto frt/phytosterol 2 (PROSTATE SR) 160-250 mg Cap Take 1 capsule by mouth 2 (two) times daily.    tamsulosin (FLOMAX) 0.4 mg Cap Take 0.4 mg by mouth once daily.    terbinafine HCl (LAMISIL) 250 mg tablet Take 250 mg by mouth once daily. For 10 days. Pt takes from 12th  of month to the 22 nd as per pt     Family History     None        Tobacco Use    Smoking status: Never Smoker   Substance and Sexual Activity    Alcohol use: No    Drug use: No    Sexual activity: Not on  file     Review of Systems   Constitutional: Negative for activity change, appetite change, fatigue and fever.   HENT: Negative for congestion and facial swelling.    Respiratory: Negative for cough, shortness of breath, wheezing and stridor.    Cardiovascular: Negative for chest pain, palpitations and leg swelling.   Gastrointestinal: Negative for abdominal pain, diarrhea, nausea and vomiting.   Endocrine: Negative.    Genitourinary: Negative for decreased urine volume, dysuria, enuresis, hematuria and urgency.   Musculoskeletal: Positive for back pain.   Skin: Negative.  Negative for rash.   Neurological: Positive for weakness. Negative for tremors, speech difficulty and numbness.     Objective:     Vital Signs (Most Recent):  Temp: 97.3 °F (36.3 °C) (01/09/20 1237)  Pulse: 99 (01/09/20 1237)  Resp: 18 (01/09/20 1237)  BP: 119/72 (01/09/20 1237)  SpO2: (!) 94 % (01/09/20 1237) Vital Signs (24h Range):  Temp:  [97.2 °F (36.2 °C)-98.2 °F (36.8 °C)] 97.3 °F (36.3 °C)  Pulse:  [71-99] 99  Resp:  [15-18] 18  SpO2:  [94 %-98 %] 94 %  BP: (119-180)/(64-86) 119/72     Weight: 68 kg (149 lb 14.6 oz)  Body mass index is 24.95 kg/m².    Physical Exam   Constitutional: He is oriented to person, place, and time. He appears well-developed and well-nourished. No distress.   HENT:   Head: Normocephalic and atraumatic.   Mouth/Throat: Oropharynx is clear and moist.   Eyes: Pupils are equal, round, and reactive to light.   Neck: Normal range of motion. Neck supple. No JVD present.   Cardiovascular: Normal rate, regular rhythm and normal heart sounds.   No murmur heard.  Pulmonary/Chest: Effort normal and breath sounds normal. No respiratory distress. He has no wheezes. He has no rales.   Abdominal: Soft. Bowel sounds are normal. He exhibits no distension. There is no tenderness.   Musculoskeletal: Normal range of motion. He exhibits no edema or deformity.   Neurological: He is alert and oriented to person, place, and time. No  sensory deficit. He exhibits normal muscle tone.   strenght 4/5 in LEs and 5/5 in upper extremities   Skin: Skin is warm.   Vitals reviewed.      Significant Labs:   BMP:   Recent Labs   Lab 01/09/20  0433   *      K 4.0      CO2 24   BUN 28*   CREATININE 1.4   CALCIUM 12.0*     CBC:   Recent Labs   Lab 01/07/20 1807 01/08/20 0443 01/09/20  0433   WBC 7.47 8.75 8.96   HGB 11.6* 11.3* 11.6*   HCT 36.4* 36.3* 37.8*    267 262     CMP:   Recent Labs   Lab 01/07/20 1807 01/08/20 0443 01/09/20  0433    141 138   K 4.6 3.8 4.0    108 106   CO2 25 26 24   * 143* 113*   BUN 38* 35* 28*   CREATININE 1.5* 1.3 1.4   CALCIUM 12.2* 11.2* 12.0*   PROT 7.7  --   --    ALBUMIN 3.4*  --   --    BILITOT 0.6  --   --    ALKPHOS 49*  --   --    AST 17  --   --    ALT 13  --   --    ANIONGAP 9 7* 8   EGFRNONAA 46.8* 55.7* 50.9*       Significant Imaging: I have reviewed and interpreted all pertinent imaging results/findings within the past 24 hours.    Assessment/Plan:     * Preoperative clearance  68 yo male patient with history of stage IV RCC s/p right Nephrectomy in 2013, with metastasis to lung found on lung biopsy in 08/2018, and further extension to to pancreas and bones noticed on CT from March 4 2019, coming as a transfer from [place] for NSGY evaluation due to L2 spinal lesion found on MRI, suspicious for metastasis vs neurofibroma. Hospital Medicine was consulted for surgical clearance. Patient denies any history of DM, HTN, strokes, CAD, PAD, COPD or any comorbidity other than BPH treated with flomax. He lives by himself and is independent for ADLS. No sx/sx of heart failure. No EKG or prior echos in chart.    Plan:   - Surgical risk assessed. See image below.        Hypercalcemia of malignancy  Patient does have hypercalcemia most likely from malignancy in the setting of metastatic RCC that has been present since 11/2019 per chart review. Ca on admissino 12 (corrected for  "albumin at 12.5). No symptoms    Plan:   - Start NS at 125 ml/hour   - Zometa 4 mg IV once.       Back pain    Pt came with a 1 month history of back pain, MRI of spine shows a hyperintense lesion in L2, possible neurofibroma vs metastatic lesion from his RCC.    Plan   - Would space hydrocodone 2 mg to q6 hours instead of q1h.   - Continue with scheduled methocarbamol and consider scheduled tylenol 1 g q8 hours, Opiates PRN.   - Avoid NSAIDs given CKD.    Metastatic renal cell carcinoma  Patient with stage IV RCC (mets to lungs, bone and pancreas) despite therapy with sorafenib. Followed by hem/onc, last seen 03/2019 by Dr Anna per chart review. Per Dr Anna's note at that time " We discussed different extreme health states that he could experience, and reviewed what kind of medical care he would want in those situations.  The patient communicated that if he were comatose and had little chance of a meaningful recovery, he would not want machines/life-sustaining treatments used.   The patient has  completed a living will to reflect these preferences"    Plan  - Recommend Palliative Care consultation.         VTE Risk Mitigation (From admission, onward)         Ordered     Place KENTON hose  Until discontinued      01/08/20 2135     Place sequential compression device  Until discontinued      01/08/20 2135     IP VTE HIGH RISK PATIENT  Once      01/08/20 2135                    Thank you for your consult. I will follow-up with patient. Please contact us if you have any additional questions.    Emmanuel Moreno MD  Department of Hospital Medicine   [facility]                    01/09/2020                             STAFF PHYSICIAN NOTE                                   Attending Attestation for Rounds with Resident  I have reviewed and concur with the resident's history, physical, assessment, and plan.  I have personally interviewed and examined the patient at bedside and agree with the resident's " findings.                                     Ella Almanzar MD  Senior Hospitalist  [id number], [phone]

## 2020-01-09 NOTE — CONSULTS
Consult Note    Inpatient consult to Hematology/Oncology  Consult performed by: Leo Bangura MD  Consult ordered by: Tonie Chopra PA-C        SUBJECTIVE:     History of Present Illness:    Raymundo Richard is a 68 y/o M admitted for NSGY evaluation of spinal mass. He presented to the ER with back pain, MRI of the spine revealed a hyperintense lesion in T2 concerning for neurofibroma. NSGY are contemplating surgical intervention.     He has a PMH significant for stage IV clear cell RCC. He was first diagnosed in 2013 and underwent a radical right nephrectomy and was placed on active surveillance. Per records from [facility] in 04/2018 multiple lung nodules were seen to be increasing in size and he underwent a lung nodule biopsy in 06/2018 which was non-diagnostic. He underwent a second lung nodule biopsy in 08/2018, which revealed metastatic RCC. He was recommended treatment with Pazopanib but reports that he could only tolerate 'the little blue pill' for 2 weeks which made him 'sick to his stomach'. He then elected to not take any further anti cancer therapy and instead chose to focus on quality of life. His care locally had been managed by Dr. Anna as [facility]. A CtT abdomen and pelvis in 03/04/2019 found a pancreatic mass suspicious for malignancy.      Review of patient's allergies indicates:   Allergen Reactions    Other Nausea And Vomiting     SODIUM PENATHOL  CAUSES SEVERE N & V     Past Medical History:   Diagnosis Date    GERD (gastroesophageal reflux disease)     Renal cell carcinoma      Past Surgical History:   Procedure Laterality Date    APPENDECTOMY      HERNIA REPAIR      NEPHRECTOMY       History reviewed. No pertinent family history.  Social History     Tobacco Use    Smoking status: Never Smoker   Substance Use Topics    Alcohol use: No    Drug use: No     ROS  OBJECTIVE:     Vital Signs:  Temp:  [97.3 °F (36.3 °C)-98 °F (36.7 °C)]   Pulse:  [71-99]   Resp:  [15-18]   BP:  (119-154)/(71-73)   SpO2:  [94 %-97 %]     Physical Exam  Laboratory:  CBC:   Recent Labs   Lab 01/09/20  0433   WBC 8.96   RBC 4.33*   HGB 11.6*   HCT 37.8*      MCV 87   MCH 26.8*   MCHC 30.7*     CMP:   Recent Labs   Lab 01/07/20  1807  01/09/20  0433   *   < > 113*   CALCIUM 12.2*   < > 12.0*   ALBUMIN 3.4*  --   --    PROT 7.7  --   --       < > 138   K 4.6   < > 4.0   CO2 25   < > 24      < > 106   BUN 38*   < > 28*   CREATININE 1.5*   < > 1.4   ALKPHOS 49*  --   --    ALT 13  --   --    AST 17  --   --    BILITOT 0.6  --   --     < > = values in this interval not displayed.       Diagnostic Results:  Labs: Reviewed  CT: Reviewed  MRI: Reviewed  PET Scan: Reviewed    ASSESSMENT/PLAN:     70 y/o M admitted for NSGY evaluation of spinal mass. Medical oncology have been consulted for evaluation of pancreatic mass concerning for metastatic RCC vs new primary cancer.     Dr. Max and I had an extensive conversation with the patient and his family regarding strategies for evaluating the pancreatic lesion.   Despite having good risk RCC with many treatment options he has elected to not get treatment in the past, so I question whether biopsy will be of value as he is not interested in chemotherapy. However the patient would like to know what the mass is which I feel is a reasonable demand.   If metastatic RCC is confirmed in the pancreas this would actually be a good prognostic indicator and I would consider out patient follow up to revisit treatment options with the patient. If the biopsy reveals pancreatic cancer that will need repeat staging, prognostication and discussion of goals of care. I do not believe he will be interested in treatment at the expense of quality of life.     For now the patient would prefer to focus on his back pain and prospective surgery so I will reach out to his oncologist to establish out pt follow up in 2-3 weeks to revaluate.    Medical oncology will sign  off. If we can be of further help please feel free to reach out.    The pt was discussed and examined with the attending physician .     Leo Bangura MD  Clinical Fellow  Hematology and Medical Oncology.  01/09/2020        I have reviewed the notes, assessments, and/or procedures performed by the housestaff, as above.  I have personally interviewed and examined the patient at the beside, and rounded with the housestaff. I concur with her/his assessment and plan and the documentation of Raymundo Richard.  I, Dr. Mook Max, personally spent more than 70 mins during this encounter, greater than 50% was spent in direct counseling and/or coordination of care.     Mook Max M.D., M.S., F.A.C.P.  Hematology/Oncology Attending  [facility]

## 2020-01-09 NOTE — NURSING
2249 pm team paged. Pt would like to know why he is  NPO after midnight and not being given methylPrednisolone 40mg since he's been taking methylPrednisolone 40mg before being transferred to the unit.    2300 pm Team responded and was informed of pt's concerns.  Team instructed this nurse that MethylPrednisone 40 mg  is not to be administered at this point in time.  Pt is NPO after midnight in case there is a procedure to be performed tomorrow..  Team to come and see pt.    2305pm  Pt's daughter in room made aware.

## 2020-01-09 NOTE — PLAN OF CARE
CM met with patient and family to obtain discharge planning assessment.  Patient admitted with back pain.  Plan of care to be determined.  Planned discharge is home - Plan (A) or home with home health - Plan (B).    PCP:  Sekou Lara III, MD     Payor:  Payor: HU[employer] MANAGED MEDICARE / Plan: HUMANA SNP (SPECIAL NEEDS PLAN) / Product Type: Medicare Advantage /      Pharmacy:    [facility]  [place] - [address]  [address] [place] [address]  Phone: [phone] Fax: [phone]    [facility] [place], [place] - [address]  [address]  [facility] [address]  Phone: [phone] Fax: [phone]    [facility] [place], - [place], [place] - [address]  [address]  [place] [address]  Phone: [phone] Fax: [phone]       01/09/20 1444   Discharge Assessment   Assessment Type Discharge Planning Assessment   Confirmed/corrected address and phone number on facesheet? Yes   Assessment information obtained from? Patient   Expected Length of Stay (days) 3   Communicated expected length of stay with patient/caregiver yes   Prior to hospitilization cognitive status: Alert/Oriented   Prior to hospitalization functional status: Independent   Current cognitive status: Alert/Oriented   Current Functional Status: Independent   Lives With alone   Able to Return to Prior Arrangements   (tbd)   Is patient able to care for self after discharge? Unable to determine at this time (comments)   Patient's perception of discharge disposition home or selfcare   Readmission Within the Last 30 Days no previous admission in last 30 days   Patient currently being followed by outpatient case management? No   Patient currently receives any other outside agency services? No   Equipment Currently Used at Home none   Do you have any problems affording any of your prescribed medications? No   Is the patient taking medications as prescribed? yes   Does the patient have  transportation home? Yes   Transportation Anticipated family or friend will provide   Does the patient receive services at the [facility]? No   Discharge Plan A Home   Discharge Plan B Home;Home Health   DME Needed Upon Discharge  none   Patient/Family in Agreement with Plan yes

## 2020-01-09 NOTE — ASSESSMENT & PLAN NOTE
"Pt is a 69yom with pmh RCC s/p nephrectomy 2013 now with known pulmonary metastasis who presented to outside hospital with acutely worsening back pain with spells of tussive lower extremity pain and weakness. MRI revealed a homogenously enhancing lesion in the lumbar spine. Pt transferred for neurosurgical evaluation.    - Neurologically stable  - No acute neurosurgical intervention at this time  - Consult placed to acute pain management for assistance due to uncontrolled pain on current regimen.   - MRI lumbar spine w contrast reveals homogenously enhancing intradural mass at L2. L4-5 disc bulge and severe facet arthropathy at this level result in severe canal stenosis and severe right neural foraminal narrowing and moderate left neural foraminal narrowing.   - He has a hx of metastatic RCC (diffuse bony mets, pulmonary mets) and new pancreatic mass that has not responded to chemotherapy. At his last visit with his oncologist, Dr. Anna, 3/8/2019 he elected not to have a biopsy done of the pancreatic mass to determine if it is pancreatic cancer. He elected to stop chemotherapy and end of life counseling/hospice was discussed. Per Dr. Anna's note, "The patient communicated that if he were comatose and had little chance of a meaningful recovery, he would not want machines/life-sustaining treatments used."   - Documented risk assessment by the [employer] found in Consult note dated 1/9/2019. Appreciate management of hypercalcemia.   - Medical oncology saw patient 1/9, appreciate assistance. Patient does not want biopsy at this time for pancreatic mass and would like to focus on treatment for back pain. Will follow-up with his oncologist at discharge.   - Patient seen by Palliative care team 1/10, appreciate assistance.  - MRI C/T spine w contrast reviewed. No enhancing intradural or bony metastasis noted within the cervical or thoracic spine, no significant canal compromise or cord signal changes. " Intramuscular lesion noted in right paraspinal muscles of cervical region.   - Discussed with Dr. Freedman.   - At this time, OR tentatively scheduled for Tuesday 1/14 for L2  laminectomy for intradural tumor resection and laminectomy at L4-5 for decompression. Discussed with patient and family at bedside, all in agreement to plan. All questions answered.

## 2020-01-09 NOTE — HPI
70 yo male patient with history of stage IV RCC s/p right Nephrectomy in 2013, with metastasis to lung found on lung biopsy in 08/2018, and further extension to to pancreas and bones noticed on CT from March 4 2019, coming as a transfer from [place] for NSGY evaluation.   He went to the ER in [place] on 1/7 due to a 1 month history of worsening back pain associated with LE weakness, MRI of the spine revealed a hyperintense lesion in T2 concerning for neurofibroma, however, given his metastatic disease he was referred here for further evaluation by NSGY. Hospital Medicine was consulted for surgical clearance.     Patient denies any history of DM, HTN, strokes, CAD, PAD, COPD or any comorbidity other than BPH treated with flomax. He lives by himself and is independent for ADLS. Prior to his back pain and weakness 1 month ago,  he used to do some exercise without limitations. Denies any exertional SOB, chest pain, LE edema. He does mention orthopnea for many years, needing to sleep with some degree of head elevation but states he was told this was from LEONARD. He was prescribed a CPAP many years ago but has not used it in the last 2 years because he has lost weight and feels he does not need it anymore.

## 2020-01-09 NOTE — ASSESSMENT & PLAN NOTE
Pt came with a 1 month history of back pain, MRI of spine shows a hyperintense lesion in L2, possible neurofibroma vs metastatic lesion from his RCC.    Plan   - Would space hydrocodone 2 mg to q6 hours instead of q1h.   - Continue with scheduled methocarbamol and consider scheduled tylenol 1 g q8 hours, Opiates PRN.   - Avoid NSAIDs given CKD.

## 2020-01-09 NOTE — HOSPITAL COURSE
He had an MRI with contrast done which showed a neurofibroma at L2-L3. He had an oncology consult done and it was determined that he would need neurosurgical evaluations. Plans were made to transfer him to [facility] for further care.

## 2020-01-09 NOTE — PLAN OF CARE
Problem: Adult Inpatient Plan of Care  Goal: Plan of Care Review  Outcome: Ongoing, Progressing     Problem: Fall Injury Risk  Goal: Absence of Fall and Fall-Related Injury  Outcome: Ongoing, Progressing   POC reviewed with daughter and pt at bedside.  Both verbalized understanding.  C/o back pain.  Pain managed by prn pain meds.  NPO since midnight.  Instructed on pain management and falls risks.  Verbalized understanding.  Questions answered.  Bed low and locked.  Daughter at bedside.  Call light within reach.  [facility].

## 2020-01-10 PROBLEM — M54.40 BACK PAIN OF LUMBAR REGION WITH SCIATICA: Status: ACTIVE | Noted: 2020-01-01

## 2020-01-10 PROBLEM — Z51.5 PALLIATIVE CARE ENCOUNTER: Status: ACTIVE | Noted: 2020-01-01

## 2020-01-10 NOTE — PROGRESS NOTES
[facility] Hospital Medicine  Progress Note    Patient Name: Raymundo Richard  MRN: [id number]  Patient Class: IP- Inpatient   Admission Date: 1/8/2020  Length of Stay: 2 days  Attending Physician: Hernandez Freedman MD  Primary Care Provider: Sekou Lara III, MD        Subjective:     Principal Problem:Preoperative clearance        HPI:  68 yo male patient with history of stage IV RCC s/p right Nephrectomy in 2013, with metastasis to lung found on lung biopsy in 08/2018, and further extension to to pancreas and bones noticed on CT from March 4 2019, coming as a transfer from [place] [facility] for NSGY evaluation.   He went to the ER in [place] on 1/7 due to a 1 month history of worsening back pain associated with LE weakness, MRI of the spine revealed a hyperintense lesion in T2 concerning for neurofibroma, however, given his metastatic disease he was referred here for further evaluation by NSGY. Hospital Medicine was consulted for surgical clearance.     Patient denies any history of DM, HTN, strokes, CAD, PAD, COPD or any comorbidity other than BPH treated with flomax. He lives by himself and is independent for ADLS. Prior to his back pain and weakness 1 month ago,  he used to do some exercise without limitations. Denies any exertional SOB, chest pain, LE edema. He does mention orthopnea for many years, needing to sleep with some degree of head elevation but states he was told this was from LEONARD. He was prescribed a CPAP many years ago but has not used it in the last 2 years because he has lost weight and feels he does not need it anymore.     Overview/Hospital Course:  No notes on file    Interval History:   Patient continues to complain of back pain.   Waiting on final neurosurgery recs.      Objective:     Vital Signs (Most Recent):  Temp: 98.2 °F (36.8 °C) (01/10/20 0810)  Pulse: 88 (01/10/20 0810)  Resp: 18 (01/10/20 0810)  BP: (!) 158/77 (01/10/20 0810)  SpO2: 95 % (01/10/20 0810) Vital Signs (24h  Range):  Temp:  [96.8 °F (36 °C)-98.8 °F (37.1 °C)] 98.2 °F (36.8 °C)  Pulse:  [77-99] 88  Resp:  [18] 18  SpO2:  [94 %-97 %] 95 %  BP: (119-164)/(61-81) 158/77     Weight: 68 kg (149 lb 14.6 oz)  Body mass index is 24.95 kg/m².    Intake/Output Summary (Last 24 hours) at 1/10/2020 1021  Last data filed at 1/9/2020 2113  Gross per 24 hour   Intake 240 ml   Output --   Net 240 ml      Physical Exam   Constitutional: He is oriented to person, place, and time. He appears well-developed and well-nourished. No distress.   HENT:   Head: Normocephalic and atraumatic.   Mouth/Throat: Oropharynx is clear and moist.   Eyes: Pupils are equal, round, and reactive to light.   Neck: Normal range of motion. Neck supple. No JVD present.   Cardiovascular: Normal rate, regular rhythm and normal heart sounds.   No murmur heard.  Pulmonary/Chest: Effort normal and breath sounds normal. No respiratory distress. He has no wheezes. He has no rales.   Abdominal: Soft. Bowel sounds are normal. He exhibits no distension. There is no tenderness.   Musculoskeletal: Normal range of motion. He exhibits no edema or deformity.   Neurological: He is alert and oriented to person, place, and time. No sensory deficit. He exhibits normal muscle tone.   strenght 4/5 in LEs and 5/5 in upper extremities   Skin: Skin is warm.   Vitals reviewed.      Significant Labs: All pertinent labs within the past 24 hours have been reviewed.    Significant Imaging: I have reviewed all pertinent imaging results/findings within the past 24 hours.      Assessment/Plan:      * Preoperative clearance  70 yo male patient with history of stage IV RCC s/p right Nephrectomy in 2013, with metastasis to lung found on lung biopsy in 08/2018, and further extension to to pancreas and bones noticed on CT from March 4 2019, coming as a transfer from [place] for NSGY evaluation due to L2 spinal lesion found on MRI, suspicious for metastasis vs neurofibroma. [facility] Medicine was  "consulted for surgical clearance. Patient denies any history of DM, HTN, strokes, CAD, PAD, COPD or any comorbidity other than BPH treated with flomax. He lives by himself and is independent for ADLS. No sx/sx of heart failure. No EKG or prior echos in chart.    Plan:   - Surgical risk assessed. See image note (1/9/20).      Hypercalcemia of malignancy  Patient does have hypercalcemia most likely from malignancy in the setting of metastatic RCC that has been present since 11/2019 per chart review. Ca on admissino 12 (corrected for albumin at 12.5). No symptoms    Plan:   - continue NS at 125 ml/hour  - s/p Zometa 4 mg IV once. (1/9/20) takes 24 hours to work  - continue to monitor       Back pain    Pt came with a 1 month history of back pain, MRI of spine shows a hyperintense lesion in L2, possible neurofibroma vs metastatic lesion from his RCC.    Plan   - continue hydrocodone 2 mg to q4 hours    - Continue with scheduled methocarbamol and consider scheduled tylenol 1 g q8 hours, Opiates PRN.   - Avoid NSAIDs given CKD.    Metastatic renal cell carcinoma  Patient with stage IV RCC (mets to lungs, bone and pancreas) despite therapy with sorafenib. Followed by hem/onc, last seen 03/2019 by Dr Anna per chart review. Per Dr Anna's note at that time " We discussed different extreme health states that he could experience, and reviewed what kind of medical care he would want in those situations.  The patient communicated that if he were comatose and had little chance of a meaningful recovery, he would not want machines/life-sustaining treatments used.   The patient has  completed a living will to reflect these preferences"    Plan  - Recommend Palliative Care consultation.       VTE Risk Mitigation (From admission, onward)         Ordered     Place KENTON hose  Until discontinued      01/08/20 2135     Place sequential compression device  Until discontinued      01/08/20 2135     IP VTE HIGH RISK PATIENT  Once      01/08/20 " 2139                  Andrea Tirado MD  Department of Hospital Medicine   [facility]                    01/10/2020                             STAFF PHYSICIAN NOTE                                   Attending Attestation for Rounds with Resident  I have reviewed and concur with the resident's history, physical, assessment, and plan.  I have personally interviewed and examined the patient at bedside and agree with the resident's findings.                                     Ella Almanzar MD  Senior Hospitalist  [id number], [phone]

## 2020-01-10 NOTE — CONSULTS
[facility]  Palliative Medicine  Consult Note    Patient Name: Raymundo Richard  MRN: [id number]  Admission Date: 1/8/2020  Hospital Length of Stay: 2 days  Code Status: Full Code   Attending Provider: Hernandez Freedman MD  Consulting Provider: PAULA Covington  Primary Care Physician: Sekou Lara III, MD  Principal Problem:Preoperative clearance        Inpatient consult to Palliative Care  Consult performed by: PAULA Araujo  Consult ordered by: Tonie Chopra PA-C  Reason for consult: goals of care   Assessment/Recommendations: Palliative medicine consult received for goals of care.   Chart reviewed and patient discussed with Tonie Boyer     Advance Care Planning  - no advanced directives have been received.   - advanced care planning education provided - how to start the conversation.  Mr. Richard would like to discuss further with his daughter before completing docuements.   - reports  His daughter Tayla Paula [phone] should be called first if he is unable to make decisions   - States he would choose to be left in God's hands and have a natural death.      Goals of Care:   - Mr. Richard states having his pain controlled is his first priority.  He is hopeful surgery will be successful.  - Mr. Richard states it is not important to know what the mass in the pancreas is.  He appears steadfast in his decisions not to pursue chemotherapy.  His quality of life is more important.  States he has had a good life.  States he does not wish to spend his last days feeling sick from chemo therapy  - Discussed home palliative care as a resource for him after discharge from the  Hospital.  He is in close proximity to [facility] and there is an out patient palliative care clinic there.  [facility] to facilitate referral   - Resources provided.     Recommendations  - patient would benefit from continued education and information regarding clinical condition and prognosis  -  Palliative medicine will continue to follow for goals of care and advanced care planning.       Full consult to follow.   Thank you for consult and opportunity to participate in Mr. Richard's care.   ALEXIS Green, ACNS-BC  Palliative Medicine  Ext [phone]

## 2020-01-10 NOTE — ASSESSMENT & PLAN NOTE
68 yo male patient with history of stage IV RCC s/p right Nephrectomy in 2013, with metastasis to lung found on lung biopsy in 08/2018, and further extension to to pancreas and bones noticed on CT from March 4 2019, coming as a transfer from [place] for NSGY evaluation due to L2 spinal lesion found on MRI, suspicious for metastasis vs neurofibroma. Hospital Medicine was consulted for surgical clearance. Patient denies any history of DM, HTN, strokes, CAD, PAD, COPD or any comorbidity other than BPH treated with flomax. He lives by himself and is independent for ADLS. No sx/sx of heart failure. No EKG or prior echos in chart.    Plan:   - Surgical risk assessed. See image note (1/9/20).

## 2020-01-10 NOTE — HPI
68 yo male patient with history of stage IV RCC s/p right Nephrectomy in 2013, with metastasis to lung found on lung biopsy in 08/2018, and further extension to to pancreas and bones noticed on CT from March 4 2019, coming as a transfer from [place] for NSGY evaluation.   He went to the ER in [place] on 1/7 due to a 1 month history of worsening back pain associated with LE weakness, MRI of the spine revealed a hyperintense lesion in T2 concerning for neurofibroma, however, given his metastatic disease he was referred here for further evaluation by NSGY. Hospital Medicine was consulted for surgical clearance.     Palliative care consulted for goals of care and advanced care planning

## 2020-01-10 NOTE — ASSESSMENT & PLAN NOTE
Patient does have hypercalcemia most likely from malignancy in the setting of metastatic RCC that has been present since 11/2019 per chart review. Ca on admissino 12 (corrected for albumin at 12.5). No symptoms    Plan:   - continue NS at 125 ml/hour  - s/p Zometa 4 mg IV once. (1/9/20) takes 24 hours to work  - continue to monitor

## 2020-01-10 NOTE — ASSESSMENT & PLAN NOTE
Pt came with a 1 month history of back pain, MRI of spine shows a hyperintense lesion in L2, possible neurofibroma vs metastatic lesion from his RCC.    Plan   - continue hydrocodone 2 mg to q4 hours    - Continue with scheduled methocarbamol and consider scheduled tylenol 1 g q8 hours, Opiates PRN.   - Avoid NSAIDs given CKD.

## 2020-01-10 NOTE — TELEPHONE ENCOUNTER
LM informing pt of NP appt c Dr. Anna in Feb. Encouraged call with questions or need to reschedule.

## 2020-01-10 NOTE — SUBJECTIVE & OBJECTIVE
Interval History: SHANA. Continues to endorse low back and BLE pain, not managed by current regimen. Consult placed to acute pain management for assistance. Plan for OR on Tuesday for L2 laminectomy for resection of intradural tumor and decompressive laminectomy at L4-5. Denies weakness, paresthesias, b/b dysfunction. Discussed plan with patient and patient's sister, all in agreement to surgical plan. Also discussed with patient's daughter Tayla over the phone, all questions answered.       Medications:  Continuous Infusions:   sodium chloride 0.9% 125 mL/hr at 01/10/20 1418     Scheduled Meds:   acetaminophen  1,000 mg Oral Q8H    diazePAM  5 mg Oral Once    finasteride  5 mg Oral Daily    gabapentin  600 mg Oral BID    methocarbamol  1,000 mg Oral TID    pantoprazole  40 mg Oral Daily    tamsulosin  0.4 mg Oral Daily     PRN Meds:HYDROmorphone, oxyCODONE, senna-docusate 8.6-50 mg       Objective:     Weight: 68 kg (149 lb 14.6 oz)  Body mass index is 24.95 kg/m².  Vital Signs (Most Recent):  Temp: 98.2 °F (36.8 °C) (01/10/20 1658)  Pulse: 94 (01/10/20 1658)  Resp: 18 (01/10/20 1658)  BP: (!) 165/79 (01/10/20 1658)  SpO2: 97 % (01/10/20 1658) Vital Signs (24h Range):  Temp:  [96.8 °F (36 °C)-98.8 °F (37.1 °C)] 98.2 °F (36.8 °C)  Pulse:  [77-99] 94  Resp:  [18] 18  SpO2:  [93 %-97 %] 97 %  BP: (125-165)/(61-81) 165/79                   Neurosurgery Physical Exam    General: well developed, well nourished, no distress.   Head: normocephalic, atraumatic  Neck: No tracheal deviation. No palpable masses.   Neurologic: Alert and oriented. Thought content appropriate.  GCS: Motor: 6/Verbal: 5/Eyes: 4 GCS Total: 15  Mental Status: Awake, Alert, Oriented x 4  Language: No aphasia  Speech: No dysarthria  Cranial nerves: face symmetric, tongue midline, CN II-XII grossly intact.   Eyes: pupils equal, round, reactive to light with accomodation, EOMI.   Ears: No drainage.   Pulmonary: normal respirations, no signs of  respiratory distress  Abdomen: soft, non-distended, not tender to palpation  Sensory: intact to light touch throughout  Motor Strength: Moves all extremities spontaneously with good tone.  Pain limited weakness in proximal BLE, otherwise full strength upper and lower extremities. No abnormal movements seen.     Strength  Deltoids Triceps Biceps Wrist Extension Wrist Flexion Hand    Upper: R 5/5 5/5 5/5 5/5 5/5 5/5    L 5/5 5/5 5/5 5/5 5/5 5/5     Iliopsoas Quadriceps Knee  Flexion Tibialis  anterior Gastro- cnemius EHL   Lower: R 4+/5 4+/5 4+/5 5/5 5/5 5/5    L 4+/5 4+/5 4+/5 5/5 5/5 5/5     Hough: absent  Clonus: absent  Vascular: Pulses 2+ and symmetric radial and dorsalis pedis. No LE edema.   Skin: Skin is warm, dry and intact.    Significant Labs:  Recent Labs   Lab 01/09/20 0433 01/10/20  0357   * 120*    141   K 4.0 4.4    111*   CO2 24 23   BUN 28* 25*   CREATININE 1.4 1.2   CALCIUM 12.0* 12.0*     Recent Labs   Lab 01/09/20 0433 01/10/20  0357   WBC 8.96 5.95   HGB 11.6* 11.3*   HCT 37.8* 38.5*    270     No results for input(s): LABPT, INR, APTT in the last 48 hours.  Microbiology Results (last 7 days)     ** No results found for the last 168 hours. **        Recent Lab Results       01/10/20  0357        Anion Gap 7     Baso # 0.08     Basophil% 1.3     BUN, Bld 25     Calcium 12.0     Chloride 111     CO2 23     Creatinine 1.2     Differential Method Automated     eGFR if African American >60.0     eGFR if non  >60.0  Comment:  Calculation used to obtain the estimated glomerular filtration  rate (eGFR) is the CKD-EPI equation.        Eos # 0.3     Eosinophil% 4.7     Glucose 120     Gran # (ANC) 3.4     Gran% 57.1     Hematocrit 38.5     Hemoglobin 11.3     Immature Grans (Abs) 0.02  Comment:  Mild elevation in immature granulocytes is non specific and   can be seen in a variety of conditions including stress response,   acute inflammation, trauma and  pregnancy. Correlation with other   laboratory and clinical findings is essential.       Immature Granulocytes 0.3     Lymph # 1.4     Lymph% 24.0     MCH 26.4     MCHC 29.4     MCV 90     Mono # 0.8     Mono% 12.6     MPV 8.9     nRBC 0     Platelets 270     Potassium 4.4     RBC 4.28     RDW 13.6     Sodium 141     WBC 5.95         All pertinent labs from the last 24 hours have been reviewed.    Significant Diagnostics:  I have reviewed all pertinent imaging results/findings within the past 24 hours.

## 2020-01-10 NOTE — PLAN OF CARE
Problem: Adult Inpatient Plan of Care  Goal: Optimal Comfort and Wellbeing  Outcome: Ongoing, Progressing       POC reviewed with pt and daughter at bedside.  Comfort maintained.  Pain managed. Support provided. Quiet environment maintained. Questions answered and encouraged.  No acute events.  Slept well.  Fall and safety precautions maintained.  Call light within reach.  WCTM.

## 2020-01-10 NOTE — SUBJECTIVE & OBJECTIVE
Interval History:   Patient continues to complain of back pain.   Waiting on final neurosurgery recs.      Objective:     Vital Signs (Most Recent):  Temp: 98.2 °F (36.8 °C) (01/10/20 0810)  Pulse: 88 (01/10/20 0810)  Resp: 18 (01/10/20 0810)  BP: (!) 158/77 (01/10/20 0810)  SpO2: 95 % (01/10/20 0810) Vital Signs (24h Range):  Temp:  [96.8 °F (36 °C)-98.8 °F (37.1 °C)] 98.2 °F (36.8 °C)  Pulse:  [77-99] 88  Resp:  [18] 18  SpO2:  [94 %-97 %] 95 %  BP: (119-164)/(61-81) 158/77     Weight: 68 kg (149 lb 14.6 oz)  Body mass index is 24.95 kg/m².    Intake/Output Summary (Last 24 hours) at 1/10/2020 1021  Last data filed at 1/9/2020 2113  Gross per 24 hour   Intake 240 ml   Output --   Net 240 ml      Physical Exam   Constitutional: He is oriented to person, place, and time. He appears well-developed and well-nourished. No distress.   HENT:   Head: Normocephalic and atraumatic.   Mouth/Throat: Oropharynx is clear and moist.   Eyes: Pupils are equal, round, and reactive to light.   Neck: Normal range of motion. Neck supple. No JVD present.   Cardiovascular: Normal rate, regular rhythm and normal heart sounds.   No murmur heard.  Pulmonary/Chest: Effort normal and breath sounds normal. No respiratory distress. He has no wheezes. He has no rales.   Abdominal: Soft. Bowel sounds are normal. He exhibits no distension. There is no tenderness.   Musculoskeletal: Normal range of motion. He exhibits no edema or deformity.   Neurological: He is alert and oriented to person, place, and time. No sensory deficit. He exhibits normal muscle tone.   strenght 4/5 in LEs and 5/5 in upper extremities   Skin: Skin is warm.   Vitals reviewed.      Significant Labs: All pertinent labs within the past 24 hours have been reviewed.    Significant Imaging: I have reviewed all pertinent imaging results/findings within the past 24 hours.

## 2020-01-10 NOTE — PROGRESS NOTES
[facility]  Neurosurgery  Progress Note    Subjective:     History of Present Illness: Pt is a 69yom with pmh RCC s/p nephrectomy 2013 now with known pulmonary metastasis who presented to outside hospital with acutely worsening back pain with spells of tussive lower extremity pain and weakness. MRI revealed a homogenously enhancing lesion in the lumbar spine. Pt transferred for neurosurgical evaluation.    Post-Op Info:  * No surgery found *         Interval History: NAEON. Continues to endorse low back and BLE pain, not managed by current regimen. Consult placed to acute pain management for assistance. Plan for OR on Tuesday for L2 laminectomy for resection of intradural tumor and decompressive laminectomy at L4-5. Denies weakness, paresthesias, b/b dysfunction. Discussed plan with patient and patient's sister, all in agreement to surgical plan. Also discussed with patient's daughter Tayla over the phone, all questions answered.       Medications:  Continuous Infusions:   sodium chloride 0.9% 125 mL/hr at 01/10/20 1418     Scheduled Meds:   acetaminophen  1,000 mg Oral Q8H    diazePAM  5 mg Oral Once    finasteride  5 mg Oral Daily    gabapentin  600 mg Oral BID    methocarbamol  1,000 mg Oral TID    pantoprazole  40 mg Oral Daily    tamsulosin  0.4 mg Oral Daily     PRN Meds:HYDROmorphone, oxyCODONE, senna-docusate 8.6-50 mg       Objective:     Weight: 68 kg (149 lb 14.6 oz)  Body mass index is 24.95 kg/m².  Vital Signs (Most Recent):  Temp: 98.2 °F (36.8 °C) (01/10/20 1658)  Pulse: 94 (01/10/20 1658)  Resp: 18 (01/10/20 1658)  BP: (!) 165/79 (01/10/20 1658)  SpO2: 97 % (01/10/20 1658) Vital Signs (24h Range):  Temp:  [96.8 °F (36 °C)-98.8 °F (37.1 °C)] 98.2 °F (36.8 °C)  Pulse:  [77-99] 94  Resp:  [18] 18  SpO2:  [93 %-97 %] 97 %  BP: (125-165)/(61-81) 165/79                   Neurosurgery Physical Exam    General: well developed, well nourished, no distress.   Head: normocephalic,  atraumatic  Neck: No tracheal deviation. No palpable masses.   Neurologic: Alert and oriented. Thought content appropriate.  GCS: Motor: 6/Verbal: 5/Eyes: 4 GCS Total: 15  Mental Status: Awake, Alert, Oriented x 4  Language: No aphasia  Speech: No dysarthria  Cranial nerves: face symmetric, tongue midline, CN II-XII grossly intact.   Eyes: pupils equal, round, reactive to light with accomodation, EOMI.   Ears: No drainage.   Pulmonary: normal respirations, no signs of respiratory distress  Abdomen: soft, non-distended, not tender to palpation  Sensory: intact to light touch throughout  Motor Strength: Moves all extremities spontaneously with good tone.  Pain limited weakness in proximal BLE, otherwise full strength upper and lower extremities. No abnormal movements seen.     Strength  Deltoids Triceps Biceps Wrist Extension Wrist Flexion Hand    Upper: R 5/5 5/5 5/5 5/5 5/5 5/5    L 5/5 5/5 5/5 5/5 5/5 5/5     Iliopsoas Quadriceps Knee  Flexion Tibialis  anterior Gastro- cnemius EHL   Lower: R 4+/5 4+/5 4+/5 5/5 5/5 5/5    L 4+/5 4+/5 4+/5 5/5 5/5 5/5     Hough: absent  Clonus: absent  Vascular: Pulses 2+ and symmetric radial and dorsalis pedis. No LE edema.   Skin: Skin is warm, dry and intact.    Significant Labs:  Recent Labs   Lab 01/09/20  0433 01/10/20  0357   * 120*    141   K 4.0 4.4    111*   CO2 24 23   BUN 28* 25*   CREATININE 1.4 1.2   CALCIUM 12.0* 12.0*     Recent Labs   Lab 01/09/20 0433 01/10/20  0357   WBC 8.96 5.95   HGB 11.6* 11.3*   HCT 37.8* 38.5*    270     No results for input(s): LABPT, INR, APTT in the last 48 hours.  Microbiology Results (last 7 days)     ** No results found for the last 168 hours. **        Recent Lab Results       01/10/20  0357        Anion Gap 7     Baso # 0.08     Basophil% 1.3     BUN, Bld 25     Calcium 12.0     Chloride 111     CO2 23     Creatinine 1.2     Differential Method Automated     eGFR if African American >60.0     eGFR if  non  >60.0  Comment:  Calculation used to obtain the estimated glomerular filtration  rate (eGFR) is the CKD-EPI equation.        Eos # 0.3     Eosinophil% 4.7     Glucose 120     Gran # (ANC) 3.4     Gran% 57.1     Hematocrit 38.5     Hemoglobin 11.3     Immature Grans (Abs) 0.02  Comment:  Mild elevation in immature granulocytes is non specific and   can be seen in a variety of conditions including stress response,   acute inflammation, trauma and pregnancy. Correlation with other   laboratory and clinical findings is essential.       Immature Granulocytes 0.3     Lymph # 1.4     Lymph% 24.0     MCH 26.4     MCHC 29.4     MCV 90     Mono # 0.8     Mono% 12.6     MPV 8.9     nRBC 0     Platelets 270     Potassium 4.4     RBC 4.28     RDW 13.6     Sodium 141     WBC 5.95         All pertinent labs from the last 24 hours have been reviewed.    Significant Diagnostics:  I have reviewed all pertinent imaging results/findings within the past 24 hours.    Assessment/Plan:     Back pain  Pt is a 69yom with pmh RCC s/p nephrectomy 2013 now with known pulmonary metastasis who presented to outside hospital with acutely worsening back pain with spells of tussive lower extremity pain and weakness. MRI revealed a homogenously enhancing lesion in the lumbar spine. Pt transferred for neurosurgical evaluation.    - Neurologically stable  - No acute neurosurgical intervention at this time  - Consult placed to acute pain management for assistance due to uncontrolled pain on current regimen.   - MRI lumbar spine w contrast reveals homogenously enhancing intradural mass at L2. L4-5 disc bulge and severe facet arthropathy at this level result in severe canal stenosis and severe right neural foraminal narrowing and moderate left neural foraminal narrowing.   - He has a hx of metastatic RCC (diffuse bony mets, pulmonary mets) and new pancreatic mass that has not responded to chemotherapy. At his last visit with his  "oncologist, Dr. Anna, 3/8/2019 he elected not to have a biopsy done of the pancreatic mass to determine if it is pancreatic cancer. He elected to stop chemotherapy and end of life counseling/hospice was discussed. Per Dr. Anna's note, "The patient communicated that if he were comatose and had little chance of a meaningful recovery, he would not want machines/life-sustaining treatments used."   - Documented risk assessment by the [employer] found in Consult note dated 1/9/2019. Appreciate management of hypercalcemia.   - Medical oncology saw patient 1/9, appreciate assistance. Patient does not want biopsy at this time for pancreatic mass and would like to focus on treatment for back pain. Will follow-up with his oncologist at discharge.   - Patient seen by Palliative care team 1/10, appreciate assistance.  - MRI C/T spine w contrast reviewed. No enhancing intradural or bony metastasis noted within the cervical or thoracic spine, no significant canal compromise or cord signal changes. Intramuscular lesion noted in right paraspinal muscles of cervical region.   - Discussed with Dr. Freedman.   - At this time, OR tentatively scheduled for Tuesday 1/14 for L2  laminectomy for intradural tumor resection and laminectomy at L4-5 for decompression. Discussed with patient and family at bedside, all in agreement to plan. All questions answered.                     Tonie Chopra PA-C  Neurosurgery  [facility]  "

## 2020-01-11 PROBLEM — I10 HYPERTENSION: Status: ACTIVE | Noted: 2020-01-01

## 2020-01-11 NOTE — ASSESSMENT & PLAN NOTE
Pt came with a 1 month history of back pain, MRI of spine shows a hyperintense lesion in L2, possible neurofibroma vs metastatic lesion from his RCC.    Plan   - Pt is at risk of medication accumulation given decreased kidney function. Pt found very somnolent this morning.  - Would change methocarbamol to PRN and decrease Gabapentin to 300 mg BID.   - Avoid NSAIDs given CKD.

## 2020-01-11 NOTE — SUBJECTIVE & OBJECTIVE
Interval History:   - Seen in his room this morning, sleepy. Has been getting pain meds frequently. Reports his pain is 5/10 now.  - BP has been in the 150-165 systolic.  - Scheduled for OR on Tuesday.  - Calcium down to 10.4 today    Review of Systems   Constitutional: Positive for appetite change. Negative for activity change, fatigue and fever.   HENT: Negative for congestion and facial swelling.    Respiratory: Negative for cough, shortness of breath, wheezing and stridor.    Cardiovascular: Negative for chest pain, palpitations and leg swelling.   Gastrointestinal: Negative for abdominal pain, diarrhea, nausea and vomiting.   Endocrine: Negative.    Genitourinary: Negative for decreased urine volume, dysuria, enuresis, hematuria and urgency.   Musculoskeletal: Positive for back pain.   Skin: Negative.  Negative for rash.   Neurological: Positive for weakness. Negative for tremors, speech difficulty and numbness.     Objective:     Vital Signs (Most Recent):  Temp: 98 °F (36.7 °C) (01/11/20 0810)  Pulse: 79 (01/11/20 0810)  Resp: 18 (01/11/20 0810)  BP: (!) 163/78 (01/11/20 0810)  SpO2: 95 % (01/11/20 0810) Vital Signs (24h Range):  Temp:  [98 °F (36.7 °C)-98.4 °F (36.9 °C)] 98 °F (36.7 °C)  Pulse:  [78-99] 79  Resp:  [18] 18  SpO2:  [93 %-99 %] 95 %  BP: (125-177)/(68-88) 163/78     Weight: 68.1 kg (150 lb 2.1 oz)  Body mass index is 24.98 kg/m².    Intake/Output Summary (Last 24 hours) at 1/11/2020 1036  Last data filed at 1/11/2020 0620  Gross per 24 hour   Intake 2136.67 ml   Output 0 ml   Net 2136.67 ml      Physical Exam   Constitutional: He is oriented to person, place, and time. He appears well-developed and well-nourished. No distress.   HENT:   Head: Normocephalic and atraumatic.   Mouth/Throat: Oropharynx is clear and moist.   Eyes: Pupils are equal, round, and reactive to light.   Neck: Normal range of motion. Neck supple. No JVD present.   Cardiovascular: Normal rate, regular rhythm and normal heart  sounds.   No murmur heard.  Pulmonary/Chest: Effort normal and breath sounds normal. No respiratory distress. He has no wheezes. He has no rales.   Abdominal: Soft. Bowel sounds are normal. He exhibits no distension. There is no tenderness.   Musculoskeletal: Normal range of motion. He exhibits no edema or deformity.   Neurological: He is alert and oriented to person, place, and time. No sensory deficit. He exhibits normal muscle tone.   strenght 4/5 in LEs and 5/5 in upper extremities   Skin: Skin is warm.   Vitals reviewed.      Significant Labs:   BMP:   Recent Labs   Lab 01/11/20 0527         K 4.2   *   CO2 25   BUN 19   CREATININE 1.2   CALCIUM 10.4     CBC:   Recent Labs   Lab 01/10/20  0357 01/11/20  0527   WBC 5.95 4.80   HGB 11.3* 11.0*   HCT 38.5* 36.8*    240     CMP:   Recent Labs   Lab 01/10/20  0357 01/11/20  0527    142   K 4.4 4.2   * 111*   CO2 23 25   * 101   BUN 25* 19   CREATININE 1.2 1.2   CALCIUM 12.0* 10.4   ANIONGAP 7* 6*   EGFRNONAA >60.0 >60.0       Significant Imaging: I have reviewed and interpreted all pertinent imaging results/findings within the past 24 hours.

## 2020-01-11 NOTE — ASSESSMENT & PLAN NOTE
Pt not on any BP at home. BP here has been in the 140-175 systolics. He is in pain which can be contributing, however given persistently high BP despite pain meds will start him on a regimen.    Plan   - Amlodipine 5 mg daily

## 2020-01-11 NOTE — ASSESSMENT & PLAN NOTE
"Pt is a 69yom with pmh RCC s/p nephrectomy 2013 now with known pulmonary metastasis who presented to outside hospital with acutely worsening back pain with spells of tussive lower extremity pain and weakness. MRI revealed a homogenously enhancing lesion in the lumbar spine. Pt transferred for neurosurgical evaluation.    - Neurologically stable  - No acute neurosurgical intervention at this time  - Consult placed to acute pain management for assistance due to uncontrolled pain on current regimen, recs pending  - MRI lumbar spine w contrast reveals homogenously enhancing intradural mass at L2. L4-5 disc bulge and severe facet arthropathy at this level result in severe canal stenosis and severe right neural foraminal narrowing and moderate left neural foraminal narrowing.   - He has a hx of metastatic RCC (diffuse bony mets, pulmonary mets) and new pancreatic mass that has not responded to chemotherapy. At his last visit with his oncologist, Dr. Anna, 3/8/2019 he elected not to have a biopsy done of the pancreatic mass to determine if it is pancreatic cancer. He elected to stop chemotherapy and end of life counseling/hospice was discussed. Per Dr. Anna's note, "The patient communicated that if he were comatose and had little chance of a meaningful recovery, he would not want machines/life-sustaining treatments used."   - Documented risk assessment by the [employer] found in Consult note dated 1/9/2019. Appreciate management of hypercalcemia.   - Medical oncology saw patient 1/9, appreciate assistance. Patient does not want biopsy at this time for pancreatic mass and would like to focus on treatment for back pain. Will follow-up with his oncologist at discharge.   - Patient seen by Palliative care team 1/10, appreciate assistance.  - MRI C/T spine w contrast reviewed. No enhancing intradural or bony metastasis noted within the cervical or thoracic spine, no significant canal compromise or cord signal " changes. Intramuscular lesion noted in right paraspinal muscles of cervical region.   - Discussed with Dr. Freedman.   - At this time, OR tentatively scheduled for Tuesday 1/14 for L2  laminectomy for intradural tumor resection and laminectomy at L4-5 for decompression. Discussed with patient and family at bedside, all in agreement to plan. All questions answered.

## 2020-01-11 NOTE — ASSESSMENT & PLAN NOTE
Palliative medicine consulted for goals of care and advanced care planning     Impression: Mr. Richard is a 68 yo gentleman with PMH of metastatic renal cell carcinoma now with mets to spine and new pancreatic lesion.  Admitted for neurosurgery evaluation.  He is awake, alert and oriented to person, place, time and situation.  Reports no shortness of breath and mild pain at this time 4/10 to lower back and legs. No acute distress.    Advance Care Planning     - no advanced directives have been received.   - advanced care planning education provided - how to start the conversation provided.   Mr. Richard would like to discuss further with his daughter before completing docuements.   - legal next of kin for medical decisions if he is unable are his 4 adult children.    - reports  His daughter Tayla Paula [phone] should be called first   - Full code per primary service.    - Discussed the risks and benefits of CPR  - States he would choose to be left in God's hands and have a natural death.     Goals of Care  - Mr. Richard states understanding his current clinical condition. He appears to have accepted that this is most likely metastatic renal cancer and there is a possibility of a new cancer.   - Reports  pain controlled is his first priority as this is has negatively impacted his quality of life.  He is hopeful surgery will be successful.  - Mr. Richard states it is high priority to know what the mass in the pancreas is. If it is cancer - it is cancer.  The cancer he currently has has not caused him distress and feels a new cancer would not change this.   - Mr. Richard states receiving new or additional chemotherapy is not an immediate goal and most likely will not be a goal.  Appears steadfast in this decision.    - Encouraged patient to talk more with the oncology doctors about options.  Explained if treatment is desired, outcomes are influenced by early treatment   - Discussed continuum of palliative medicine if he  does not have treatment including home palliative care - for symptom management, advanced care planning, continued goals of care and hospice care when appropriate.  - Mr. Richard amenable to home palliative care and lives within close proximity to [facility] which has an outpatient clinic.  Agreeable to referral      Plan/Recommendations:   - Continue current pain management plan. Will reassess post operatively   - Appears amenable to DNR order - primary team to assess and write orders as appropriate  - would benefit from continued information and education regarding clinical condtiion  - Amenable to home palliative care - [facility] [place] [facility] will facilitate referral   - Palliative medicine will continue to follow for goals of care and advanced care planning.

## 2020-01-11 NOTE — ASSESSMENT & PLAN NOTE
"Patient with stage IV RCC (mets to lungs, bone and pancreas) despite therapy with sorafenib. Followed by hem/onc, last seen 03/2019 by Dr Anna per chart review. Per Dr Anna's note at that time " We discussed different extreme health states that he could experience, and reviewed what kind of medical care he would want in those situations.  The patient communicated that if he were comatose and had little chance of a meaningful recovery, he would not want machines/life-sustaining treatments used.   The patient has  completed a living will to reflect these preferences"    Plan  - F/u Palliative care recs  - Onc f/u as outpatient    "

## 2020-01-11 NOTE — ASSESSMENT & PLAN NOTE
Patient does have hypercalcemia most likely from malignancy in the setting of metastatic RCC that has been present since 11/2019 per chart review. Ca on admissino 12 (corrected for albumin at 12.5). No symptoms    Plan:  - s/p Zometa 4 mg IV once. (1/9/20).  - Calcium today down to 10.2  - Decrease IVF to 75 cc/h. If Ca better in AM will stop IVF

## 2020-01-11 NOTE — PROGRESS NOTES
[facility]  Progress Note    Patient Name: Raymundo Richard  MRN: [id number]  Patient Class: IP- Inpatient   Admission Date: 1/8/2020  Length of Stay: 3 days  Attending Physician: Hernandez Freedman MD  Primary Care Provider: Sekou Lara III, MD        Subjective:     Principal Problem:Preoperative clearance        HPI:  68 yo male patient with history of stage IV RCC s/p right Nephrectomy in 2013, with metastasis to lung found on lung biopsy in 08/2018, and further extension to to pancreas and bones noticed on CT from March 4 2019, coming as a transfer from [place] for NSGY evaluation.   He went to the ER in [place] on 1/7 due to a 1 month history of worsening back pain associated with LE weakness, MRI of the spine revealed a hyperintense lesion in T2 concerning for neurofibroma, however, given his metastatic disease he was referred here for further evaluation by NSGY. Hospital Medicine was consulted for surgical clearance.     Patient denies any history of DM, HTN, strokes, CAD, PAD, COPD or any comorbidity other than BPH treated with flomax. He lives by himself and is independent for ADLS. Prior to his back pain and weakness 1 month ago,  he used to do some exercise without limitations. Denies any exertional SOB, chest pain, LE edema. He does mention orthopnea for many years, needing to sleep with some degree of head elevation but states he was told this was from LEONARD. He was prescribed a CPAP many years ago but has not used it in the last 2 years because he has lost weight and feels he does not need it anymore.     Overview/Hospital Course:  No notes on file    Interval History:   - Seen in his room this morning, sleepy. Has been getting pain meds frequently. Reports his pain is 5/10 now.  - BP has been in the 150-165 systolic.  - Scheduled for OR on Tuesday.  - Calcium down to 10.4 today    Review of Systems   Constitutional: Positive for appetite change. Negative for activity  change, fatigue and fever.   HENT: Negative for congestion and facial swelling.    Respiratory: Negative for cough, shortness of breath, wheezing and stridor.    Cardiovascular: Negative for chest pain, palpitations and leg swelling.   Gastrointestinal: Negative for abdominal pain, diarrhea, nausea and vomiting.   Endocrine: Negative.    Genitourinary: Negative for decreased urine volume, dysuria, enuresis, hematuria and urgency.   Musculoskeletal: Positive for back pain.   Skin: Negative.  Negative for rash.   Neurological: Positive for weakness. Negative for tremors, speech difficulty and numbness.     Objective:     Vital Signs (Most Recent):  Temp: 98 °F (36.7 °C) (01/11/20 0810)  Pulse: 79 (01/11/20 0810)  Resp: 18 (01/11/20 0810)  BP: (!) 163/78 (01/11/20 0810)  SpO2: 95 % (01/11/20 0810) Vital Signs (24h Range):  Temp:  [98 °F (36.7 °C)-98.4 °F (36.9 °C)] 98 °F (36.7 °C)  Pulse:  [78-99] 79  Resp:  [18] 18  SpO2:  [93 %-99 %] 95 %  BP: (125-177)/(68-88) 163/78     Weight: 68.1 kg (150 lb 2.1 oz)  Body mass index is 24.98 kg/m².    Intake/Output Summary (Last 24 hours) at 1/11/2020 1036  Last data filed at 1/11/2020 0620  Gross per 24 hour   Intake 2136.67 ml   Output 0 ml   Net 2136.67 ml      Physical Exam   Constitutional: He is oriented to person, place, and time. He appears well-developed and well-nourished. No distress.   HENT:   Head: Normocephalic and atraumatic.   Mouth/Throat: Oropharynx is clear and moist.   Eyes: Pupils are equal, round, and reactive to light.   Neck: Normal range of motion. Neck supple. No JVD present.   Cardiovascular: Normal rate, regular rhythm and normal heart sounds.   No murmur heard.  Pulmonary/Chest: Effort normal and breath sounds normal. No respiratory distress. He has no wheezes. He has no rales.   Abdominal: Soft. Bowel sounds are normal. He exhibits no distension. There is no tenderness.   Musculoskeletal: Normal range of motion. He exhibits no edema or deformity.    Neurological: He is alert and oriented to person, place, and time. No sensory deficit. He exhibits normal muscle tone.   strenght 4/5 in LEs and 5/5 in upper extremities   Skin: Skin is warm.   Vitals reviewed.      Significant Labs:   BMP:   Recent Labs   Lab 01/11/20  0527         K 4.2   *   CO2 25   BUN 19   CREATININE 1.2   CALCIUM 10.4     CBC:   Recent Labs   Lab 01/10/20  0357 01/11/20 0527   WBC 5.95 4.80   HGB 11.3* 11.0*   HCT 38.5* 36.8*    240     CMP:   Recent Labs   Lab 01/10/20  0357 01/11/20  0527    142   K 4.4 4.2   * 111*   CO2 23 25   * 101   BUN 25* 19   CREATININE 1.2 1.2   CALCIUM 12.0* 10.4   ANIONGAP 7* 6*   EGFRNONAA >60.0 >60.0       Significant Imaging: I have reviewed and interpreted all pertinent imaging results/findings within the past 24 hours.      Assessment/Plan:      * Preoperative clearance  68 yo male patient with history of stage IV RCC s/p right Nephrectomy in 2013, with metastasis to lung found on lung biopsy in 08/2018, and further extension to to pancreas and bones noticed on CT from March 4 2019, coming as a transfer from [place] for NSGY evaluation due to L2 spinal lesion found on MRI, suspicious for metastasis vs neurofibroma. Hospital Medicine was consulted for surgical clearance. Patient denies any history of DM, HTN, strokes, CAD, PAD, COPD or any comorbidity other than BPH treated with flomax. He lives by himself and is independent for ADLS. No sx/sx of heart failure. No EKG or prior echos in chart.    Plan:   - Surgical risk assessed. See image on note (1/9/20).      Hypercalcemia of malignancy  Patient does have hypercalcemia most likely from malignancy in the setting of metastatic RCC that has been present since 11/2019 per chart review. Ca on admissino 12 (corrected for albumin at 12.5). No symptoms    Plan:  - s/p Zometa 4 mg IV once. (1/9/20).  - Calcium today down to 10.2  - Decrease IVF to 75 cc/h. If Ca better  "in AM will stop IVF       Back pain    Pt came with a 1 month history of back pain, MRI of spine shows a hyperintense lesion in L2, possible neurofibroma vs metastatic lesion from his RCC.    Plan   - Pt is at risk of medication accumulation given decreased kidney function. Pt found very somnolent this morning.  - Would change methocarbamol to PRN and decrease Gabapentin to 300 mg BID.   - Avoid NSAIDs given CKD.    Metastatic renal cell carcinoma  Patient with stage IV RCC (mets to lungs, bone and pancreas) despite therapy with sorafenib. Followed by hem/onc, last seen 03/2019 by Dr Anna per chart review. Per Dr Anna's note at that time " We discussed different extreme health states that he could experience, and reviewed what kind of medical care he would want in those situations.  The patient communicated that if he were comatose and had little chance of a meaningful recovery, he would not want machines/life-sustaining treatments used.   The patient has  completed a living will to reflect these preferences"    Plan  - F/u Palliative care recs  - Onc f/u as outpatient      Hypertension  Pt not on any BP at home. BP here has been in the 140-175 systolics. He is in pain which can be contributing, however given persistently high BP despite pain meds will start him on a regimen.    Plan   - Amlodipine 5 mg daily      Palliative care encounter        VTE Risk Mitigation (From admission, onward)         Ordered     Place KENTON hose  Until discontinued      01/08/20 2135     Place sequential compression device  Until discontinued      01/08/20 2135     IP VTE HIGH RISK PATIENT  Once      01/08/20 2135                      Emmanuel Moreno MD  Department of Hospital Medicine   [facility]                    01/12/2020                             STAFF PHYSICIAN NOTE                                   Attending Attestation for Rounds with Resident  I have reviewed and concur with the resident's history, " physical, assessment, and plan.  I have personally interviewed and examined the patient at bedside and agree with the resident's findings.                                     Ella Almanzar MD  Senior Hospitalist  [id number], [phone]

## 2020-01-11 NOTE — SUBJECTIVE & OBJECTIVE
Interval History:     Past Medical History:   Diagnosis Date    GERD (gastroesophageal reflux disease)     Renal cell carcinoma        Past Surgical History:   Procedure Laterality Date    APPENDECTOMY      HERNIA REPAIR      NEPHRECTOMY         Review of patient's allergies indicates:   Allergen Reactions    Other Nausea And Vomiting     SODIUM PENATHOL  CAUSES SEVERE N & V       Medications:  Continuous Infusions:   sodium chloride 0.9% 125 mL/hr at 01/11/20 0622     Scheduled Meds:   acetaminophen  1,000 mg Oral Q8H    diazePAM  5 mg Oral Once    finasteride  5 mg Oral Daily    gabapentin  600 mg Oral BID    methocarbamol  1,000 mg Oral TID    pantoprazole  40 mg Oral Daily    tamsulosin  0.4 mg Oral Daily     PRN Meds:HYDROmorphone, oxyCODONE, senna-docusate 8.6-50 mg    Family History     None        Tobacco Use    Smoking status: Never Smoker   Substance and Sexual Activity    Alcohol use: No    Drug use: No    Sexual activity: Not on file       Review of Systems   Constitutional: Positive for activity change and fatigue.   Musculoskeletal: Positive for back pain.   Neurological: Positive for weakness and numbness.   Psychiatric/Behavioral: Negative for agitation, behavioral problems and confusion.     Objective:     Vital Signs (Most Recent):  Temp: 98.1 °F (36.7 °C) (01/11/20 0259)  Pulse: 99 (01/11/20 0259)  Resp: 18 (01/11/20 0259)  BP: (!) 177/88 (01/11/20 0259)  SpO2: (!) 94 % (01/11/20 0259) Vital Signs (24h Range):  Temp:  [98 °F (36.7 °C)-98.4 °F (36.9 °C)] 98.1 °F (36.7 °C)  Pulse:  [78-99] 99  Resp:  [18] 18  SpO2:  [93 %-99 %] 94 %  BP: (125-177)/(68-88) 177/88     Weight: 68.1 kg (150 lb 2.1 oz)  Body mass index is 24.98 kg/m².    Review of Symptoms  Symptom Assessment (ESAS 0-10 scale)   ESAS 0 1 2 3 4 5 6 7 8 9 10   Pain      x        Dyspnea x             Anxiety x             Nausea x             Depression  x             Anorexia x             Fatigue    x          Insomnia  "x             Restlessness  x             Agitation x             CAM / Delirium __ --  ___+   Constipation     __ --  ___+   Diarrhea           __ --  ___+  Bowel Management Plan (BMP): yes    Comments:   Pain Assessment:complains of moderate to severe 5-8/10 intermittent pain to legs and lower back.  Associated with "disc problems" and newly discovered mass.  Pain is impacting mobility.  Relieved with pain medications       OME in 24 hours: 33    Performance Status: 50    ECOG Performance Status Grade: 1 - Ambulates, capable of light work    Physical Exam   Constitutional: He is oriented to person, place, and time. He appears well-developed and well-nourished. No distress.   HENT:   Head: Normocephalic and atraumatic.   Cardiovascular: Normal rate, regular rhythm and normal heart sounds.   Pulmonary/Chest: Effort normal and breath sounds normal. No respiratory distress.   Abdominal: Soft. Bowel sounds are normal.   Musculoskeletal:   Back pain, uses walker    Neurological: He is alert and oriented to person, place, and time.   Skin: Skin is warm and dry.   Psychiatric: He has a normal mood and affect. His behavior is normal. Judgment and thought content normal.   Nursing note and vitals reviewed.      Significant Labs: All pertinent labs within the past 24 hours have been reviewed.  CBC:   Recent Labs   Lab 01/11/20  0527   WBC 4.80   HGB 11.0*   HCT 36.8*   MCV 90        BMP:  Recent Labs   Lab 01/11/20  0527         K 4.2   *   CO2 25   BUN 19   CREATININE 1.2   CALCIUM 10.4     LFT:  Lab Results   Component Value Date    AST 17 01/07/2020    ALKPHOS 49 (L) 01/07/2020    BILITOT 0.6 01/07/2020     Albumin:   Albumin   Date Value Ref Range Status   01/07/2020 3.4 (L) 3.5 - 5.2 g/dL Final   10/03/2012 4.1 3.6 - 5.1 g/dL Final     Comment:     Albumin:  THIS TEST WAS PERFORMED AT:  [facility] [address] [id number]  NIKKY Strickland " "MD, PhD     Protein:   Total Protein   Date Value Ref Range Status   01/07/2020 7.7 6.0 - 8.4 g/dL Final     Lactic acid:   No results found for: LACTATE    Significant Imaging: I have reviewed all pertinent imaging results/findings within the past 24 hours.    Advance Care Planning   Advanced Directives::  Living Will: No  LaPOST: No  Do Not Resuscitate Status: No  Medical Power of : No    Decision-Making Capacity: Patient answered questions       Living Arrangements: Lives with family    Psychosocial/Cultural: , 4 adult children,  Worked as a , enjoys going to Confucianism and cooking   Patient's most important priorities:  Is to have pain controlled.  Goals do not include taking any systemic chemotherapy or immunotherapy.  States previous experience made him very sick.      Patient's biggest concerns/fears: States it is not important to know what this is - new cancer, if it will mean procedures and drugs that will take away his quality of life.  If it is a cancer he has already made decision not to treat.  If this would be  Cause of  his death, he believes this is God's will.  He does not intend to be sick from drugs if it will not keep him from dying.     Patient's goals/hopes:  - to be comfortable and has a much quality of life as possible.  Spend time with his family     piritual:     F- Francine and Belief: Episcopal     I - Importance: "Deep belief in God's will and uses francine to guide decisions"  .  C - Community: attends Confucianism services routinely, participates in Confucianism activities    A - Address in Care: amenable to  visits and his preacher has been in contact with him.  "

## 2020-01-11 NOTE — ASSESSMENT & PLAN NOTE
68 yo male patient with history of stage IV RCC s/p right Nephrectomy in 2013, with metastasis to lung found on lung biopsy in 08/2018, and further extension to to pancreas and bones noticed on CT from March 4 2019, coming as a transfer from [place] for NSGY evaluation due to L2 spinal lesion found on MRI, suspicious for metastasis vs neurofibroma. Hospital Medicine was consulted for surgical clearance. Patient denies any history of DM, HTN, strokes, CAD, PAD, COPD or any comorbidity other than BPH treated with flomax. He lives by himself and is independent for ADLS. No sx/sx of heart failure. No EKG or prior echos in chart.    Plan:   - Surgical risk assessed. See image on note (1/9/20).

## 2020-01-11 NOTE — CONSULTS
[facility]  Palliative Medicine  Consult Note    Patient Name: Raymundo Richard  MRN: [id number]  Admission Date: 1/8/2020  Hospital Length of Stay: 3 days  Code Status: Full Code   Attending Provider: Hernandez Freedman MD  Consulting Provider: PAULA Covington  Primary Care Physician: Sekou Lara III, MD  Principal Problem:Preoperative clearance    Patient information was obtained from patient, relative(s), caregiver / friend and ER records.      Consults  Assessment/Plan:     Palliative care encounter  Palliative medicine consulted for goals of care and advanced care planning     Impression: Mr. Richard is a 68 yo gentleman with PMH of metastatic renal cell carcinoma now with mets to spine and new pancreatic lesion.  Admitted for neurosurgery evaluation.  He is awake, alert and oriented to person, place, time and situation.  Reports no shortness of breath and mild pain at this time 4/10 to lower back and legs. No acute distress.    Advance Care Planning     - no advanced directives have been received.   - advanced care planning education provided - how to start the conversation provided.   Mr. Richard would like to discuss further with his daughter before completing docuements.   - legal next of kin for medical decisions if he is unable are his 4 adult children.    - reports  His daughter Tayla Paula [phone] should be called first   - Full code per primary service.    - Discussed the risks and benefits of CPR  - States he would choose to be left in God's hands and have a natural death.     Goals of Care  - Mr. Richard states understanding his current clinical condition. He appears to have accepted that this is most likely metastatic renal cancer and there is a possibility of a new cancer.   - Reports  pain controlled is his first priority as this is has negatively impacted his quality of life.  He is hopeful surgery will be successful.  - Mr. Richard states it is high priority to know what  the mass in the pancreas is. If it is cancer - it is cancer.  The cancer he currently has has not caused him distress and feels a new cancer would not change this.   - Mr. Richard states receiving new or additional chemotherapy is not an immediate goal and most likely will not be a goal.  Appears steadfast in this decision.    - Encouraged patient to talk more with the oncology doctors about options.  Explained if treatment is desired, outcomes are influenced by early treatment   - Discussed continuum of palliative medicine if he does not have treatment including home palliative care - for symptom management, advanced care planning, continued goals of care and hospice care when appropriate.  - Mr. Richard amenable to home palliative care and lives within close proximity to [facility] which has an outpatient clinic.  Agreeable to referral      Plan/Recommendations:   - Continue current pain management plan. Will reassess post operatively   - Appears amenable to DNR order - primary team to assess and write orders as appropriate  - would benefit from continued information and education regarding clinical condtiion  - Amenable to home palliative care - pal med will facilitate referral   - Palliative medicine will continue to follow for goals of care and advanced care planning.              Thank you for your consult. I will follow-up with patient. Please contact us if you have any additional questions.    Subjective:     HPI:   70 yo male patient with history of stage IV RCC s/p right Nephrectomy in 2013, with metastasis to lung found on lung biopsy in 08/2018, and further extension to to pancreas and bones noticed on CT from March 4 2019, coming as a transfer from [place] for NSGY evaluation.   He went to the ER in [place] on 1/7 due to a 1 month history of worsening back pain associated with LE weakness, MRI of the spine revealed a hyperintense lesion in T2 concerning for neurofibroma, however, given his  metastatic disease he was referred here for further evaluation by NSGY. Hospital Medicine was consulted for surgical clearance.     Palliative care consulted for goals of care and advanced care planning     Hospital Course:  No notes on file    Interval History:     Past Medical History:   Diagnosis Date    GERD (gastroesophageal reflux disease)     Renal cell carcinoma        Past Surgical History:   Procedure Laterality Date    APPENDECTOMY      HERNIA REPAIR      NEPHRECTOMY         Review of patient's allergies indicates:   Allergen Reactions    Other Nausea And Vomiting     SODIUM PENATHOL  CAUSES SEVERE N & V       Medications:  Continuous Infusions:   sodium chloride 0.9% 125 mL/hr at 01/11/20 0622     Scheduled Meds:   acetaminophen  1,000 mg Oral Q8H    diazePAM  5 mg Oral Once    finasteride  5 mg Oral Daily    gabapentin  600 mg Oral BID    methocarbamol  1,000 mg Oral TID    pantoprazole  40 mg Oral Daily    tamsulosin  0.4 mg Oral Daily     PRN Meds:HYDROmorphone, oxyCODONE, senna-docusate 8.6-50 mg    Family History     None        Tobacco Use    Smoking status: Never Smoker   Substance and Sexual Activity    Alcohol use: No    Drug use: No    Sexual activity: Not on file       Review of Systems   Constitutional: Positive for activity change and fatigue.   Musculoskeletal: Positive for back pain.   Neurological: Positive for weakness and numbness.   Psychiatric/Behavioral: Negative for agitation, behavioral problems and confusion.     Objective:     Vital Signs (Most Recent):  Temp: 98.1 °F (36.7 °C) (01/11/20 0259)  Pulse: 99 (01/11/20 0259)  Resp: 18 (01/11/20 0259)  BP: (!) 177/88 (01/11/20 0259)  SpO2: (!) 94 % (01/11/20 0259) Vital Signs (24h Range):  Temp:  [98 °F (36.7 °C)-98.4 °F (36.9 °C)] 98.1 °F (36.7 °C)  Pulse:  [78-99] 99  Resp:  [18] 18  SpO2:  [93 %-99 %] 94 %  BP: (125-177)/(68-88) 177/88     Weight: 68.1 kg (150 lb 2.1 oz)  Body mass index is 24.98 kg/m².    Review  "of Symptoms  Symptom Assessment (ESAS 0-10 scale)   ESAS 0 1 2 3 4 5 6 7 8 9 10   Pain      x        Dyspnea x             Anxiety x             Nausea x             Depression  x             Anorexia x             Fatigue    x          Insomnia x             Restlessness  x             Agitation x             CAM / Delirium __ --  ___+   Constipation     __ --  ___+   Diarrhea           __ --  ___+  Bowel Management Plan (BMP): yes    Comments:   Pain Assessment:complains of moderate to severe 5-8/10 intermittent pain to legs and lower back.  Associated with "disc problems" and newly discovered mass.  Pain is impacting mobility.  Relieved with pain medications       OME in 24 hours: 33    Performance Status: 50    ECOG Performance Status Grade: 1 - Ambulates, capable of light work    Physical Exam   Constitutional: He is oriented to person, place, and time. He appears well-developed and well-nourished. No distress.   HENT:   Head: Normocephalic and atraumatic.   Cardiovascular: Normal rate, regular rhythm and normal heart sounds.   Pulmonary/Chest: Effort normal and breath sounds normal. No respiratory distress.   Abdominal: Soft. Bowel sounds are normal.   Musculoskeletal:   Back pain, uses walker    Neurological: He is alert and oriented to person, place, and time.   Skin: Skin is warm and dry.   Psychiatric: He has a normal mood and affect. His behavior is normal. Judgment and thought content normal.   Nursing note and vitals reviewed.      Significant Labs: All pertinent labs within the past 24 hours have been reviewed.  CBC:   Recent Labs   Lab 01/11/20  0527   WBC 4.80   HGB 11.0*   HCT 36.8*   MCV 90        BMP:  Recent Labs   Lab 01/11/20  0527         K 4.2   *   CO2 25   BUN 19   CREATININE 1.2   CALCIUM 10.4     LFT:  Lab Results   Component Value Date    AST 17 01/07/2020    ALKPHOS 49 (L) 01/07/2020    BILITOT 0.6 01/07/2020     Albumin:   Albumin   Date Value Ref Range Status " "  01/07/2020 3.4 (L) 3.5 - 5.2 g/dL Final   10/03/2012 4.1 3.6 - 5.1 g/dL Final     Comment:     Albumin:  THIS TEST WAS PERFORMED AT:  [facility]  [address] [place] [address]  NIKKY Strickland MD, PhD     Protein:   Total Protein   Date Value Ref Range Status   01/07/2020 7.7 6.0 - 8.4 g/dL Final     Lactic acid:   No results found for: LACTATE    Significant Imaging: I have reviewed all pertinent imaging results/findings within the past 24 hours.    Advance Care Planning   Advanced Directives::  Living Will: No  LaPOST: No  Do Not Resuscitate Status: No  Medical Power of : No    Decision-Making Capacity: Patient answered questions       Living Arrangements: Lives with family    Psychosocial/Cultural: , 4 adult children,  Worked as a , enjoys going to [employer] and cooking   Patient's most important priorities:  Is to have pain controlled.  Goals do not include taking any systemic chemotherapy or immunotherapy.  States previous experience made him very sick.      Patient's biggest concerns/fears: States it is not important to know what this is - new cancer, if it will mean procedures and drugs that will take away his quality of life.  If it is a cancer he has already made decision not to treat.  If this would be  Cause of  his death, he believes this is God's will.  He does not intend to be sick from drugs if it will not keep him from dying.     Patient's goals/hopes:  - to be comfortable and has a much quality of life as possible.  Spend time with his family     piritual:     F- Francine and Belief: Lutheran     I - Importance: "Deep belief in God's will and uses francine to guide decisions"  .  C - Community: attends [employer] services routinely, participates in [employer] activities    A - Address in Care: amenable to  visits and his preacher has been in contact with him.      > 50% of  70  min visit spent in chart review, face to face " discussion of goals of care,  symptom assessment, coordination of care and emotional support.  Greater than 25 minutes spent in advanced care planning     Tiesha Pineda, CNS  Palliative Medicine  [facility]

## 2020-01-11 NOTE — PROGRESS NOTES
[facility]  Neurosurgery  Progress Note    Subjective:     History of Present Illness: Pt is a 69yom with pmh RCC s/p nephrectomy 2013 now with known pulmonary metastasis who presented to outside hospital with acutely worsening back pain with spells of tussive lower extremity pain and weakness. MRI revealed a homogenously enhancing lesion in the lumbar spine. Pt transferred for neurosurgical evaluation.    Post-Op Info:  Procedure(s) (LRB):  L2-3 laminectomy for resection of intradural mass and L4-5 laminectomy (N/A)         Interval History: NAEON. Continues to c/o back pain and BLE pain. Neurologically stable. Pending OR on Tues.     Medications:  Continuous Infusions:   sodium chloride 0.9%       Scheduled Meds:   acetaminophen  1,000 mg Oral Q8H    amLODIPine  5 mg Oral Daily    diazePAM  5 mg Oral Once    finasteride  5 mg Oral Daily    gabapentin  300 mg Oral BID    pantoprazole  40 mg Oral Daily    polyethylene glycol  17 g Oral Daily    tamsulosin  0.4 mg Oral Daily     PRN Meds:HYDROmorphone, methocarbamol, oxyCODONE, senna-docusate 8.6-50 mg     Review of Systems  Objective:     Weight: 68.1 kg (150 lb 2.1 oz)  Body mass index is 24.98 kg/m².  Vital Signs (Most Recent):  Temp: 98.8 °F (37.1 °C) (01/11/20 1219)  Pulse: 88 (01/11/20 1219)  Resp: 18 (01/11/20 1219)  BP: (!) 154/72 (01/11/20 1219)  SpO2: 97 % (01/11/20 1219) Vital Signs (24h Range):  Temp:  [98 °F (36.7 °C)-98.8 °F (37.1 °C)] 98.8 °F (37.1 °C)  Pulse:  [78-99] 88  Resp:  [18] 18  SpO2:  [93 %-99 %] 97 %  BP: (125-177)/(68-88) 154/72                          Neurosurgery Physical Exam   General: well developed, well nourished, no distress.   Head: normocephalic, atraumatic  Neck: No tracheal deviation. No palpable masses.   Neurologic: Alert and oriented. Thought content appropriate.  GCS: Motor: 6/Verbal: 5/Eyes: 4 GCS Total: 15  Mental Status: Awake, Alert, Oriented x 4  Language: No aphasia  Speech: No  dysarthria  Cranial nerves: face symmetric, tongue midline, CN II-XII grossly intact.   Eyes: pupils equal, round, reactive to light with accomodation, EOMI.   Ears: No drainage.   Pulmonary: normal respirations, no signs of respiratory distress  Abdomen: soft, non-distended, not tender to palpation  Sensory: intact to light touch throughout  Motor Strength: Moves all extremities spontaneously with good tone.  Pain limited weakness in BLE. No abnormal movements seen.      Strength   Deltoids Triceps Biceps Wrist Extension Wrist Flexion Hand    Upper: R 5/5 5/5 5/5 5/5 5/5 5/5     L 5/5 5/5 5/5 5/5 5/5 5/5       Iliopsoas Quadriceps Knee  Flexion Tibialis  anterior Gastro- cnemius EHL   Lower: R 4/5 4/5 4/5 4/5 4/5 4/5     L 4/5 4/5 4/5 4/5 4/5 4/5      Hough: absent  Clonus: absent  Vascular: Pulses 2+ and symmetric radial and dorsalis pedis. No LE edema.   Skin: Skin is warm, dry and intact.      Significant Labs:  Recent Labs   Lab 01/10/20  0357 01/11/20  0527   * 101    142   K 4.4 4.2   * 111*   CO2 23 25   BUN 25* 19   CREATININE 1.2 1.2   CALCIUM 12.0* 10.4     Recent Labs   Lab 01/10/20  0357 01/11/20  0527   WBC 5.95 4.80   HGB 11.3* 11.0*   HCT 38.5* 36.8*    240       Significant Diagnostics:  No results found in the last 24 hours.      Assessment/Plan:     Back pain  Pt is a 69yom with pmh RCC s/p nephrectomy 2013 now with known pulmonary metastasis who presented to outside hospital with acutely worsening back pain with spells of tussive lower extremity pain and weakness. MRI revealed a homogenously enhancing lesion in the lumbar spine. Pt transferred for neurosurgical evaluation.    - Neurologically stable  - No acute neurosurgical intervention at this time  - Consult placed to acute pain management for assistance due to uncontrolled pain on current regimen, recs pending  - MRI lumbar spine w contrast reveals homogenously enhancing intradural mass at L2. L4-5 disc bulge  "and severe facet arthropathy at this level result in severe canal stenosis and severe right neural foraminal narrowing and moderate left neural foraminal narrowing.   - He has a hx of metastatic RCC (diffuse bony mets, pulmonary mets) and new pancreatic mass that has not responded to chemotherapy. At his last visit with his oncologist, Dr. Anna, 3/8/2019 he elected not to have a biopsy done of the pancreatic mass to determine if it is pancreatic cancer. He elected to stop chemotherapy and end of life counseling/hospice was discussed. Per Dr. Anna's note, "The patient communicated that if he were comatose and had little chance of a meaningful recovery, he would not want machines/life-sustaining treatments used."   - Documented risk assessment by the [employer] found in Consult note dated 1/9/2019. Appreciate management of hypercalcemia.   - Medical oncology saw patient 1/9, appreciate assistance. Patient does not want biopsy at this time for pancreatic mass and would like to focus on treatment for back pain. Will follow-up with his oncologist at discharge.   - Patient seen by Palliative care team 1/10, appreciate assistance.  - MRI C/T spine w contrast reviewed. No enhancing intradural or bony metastasis noted within the cervical or thoracic spine, no significant canal compromise or cord signal changes. Intramuscular lesion noted in right paraspinal muscles of cervical region.   - Discussed with Dr. Freedman.   - At this time, OR tentatively scheduled for Tuesday 1/14 for L2  laminectomy for intradural tumor resection and laminectomy at L4-5 for decompression. Discussed with patient and family at bedside, all in agreement to plan. All questions answered.                     Kennedi Weaver MD  Neurosurgery  [facility]  "

## 2020-01-11 NOTE — SUBJECTIVE & OBJECTIVE
Interval History: NAEON. Continues to c/o back pain and BLE pain. Neurologically stable. Pending OR on Tues.     Medications:  Continuous Infusions:   sodium chloride 0.9%       Scheduled Meds:   acetaminophen  1,000 mg Oral Q8H    amLODIPine  5 mg Oral Daily    diazePAM  5 mg Oral Once    finasteride  5 mg Oral Daily    gabapentin  300 mg Oral BID    pantoprazole  40 mg Oral Daily    polyethylene glycol  17 g Oral Daily    tamsulosin  0.4 mg Oral Daily     PRN Meds:HYDROmorphone, methocarbamol, oxyCODONE, senna-docusate 8.6-50 mg     Review of Systems  Objective:     Weight: 68.1 kg (150 lb 2.1 oz)  Body mass index is 24.98 kg/m².  Vital Signs (Most Recent):  Temp: 98.8 °F (37.1 °C) (01/11/20 1219)  Pulse: 88 (01/11/20 1219)  Resp: 18 (01/11/20 1219)  BP: (!) 154/72 (01/11/20 1219)  SpO2: 97 % (01/11/20 1219) Vital Signs (24h Range):  Temp:  [98 °F (36.7 °C)-98.8 °F (37.1 °C)] 98.8 °F (37.1 °C)  Pulse:  [78-99] 88  Resp:  [18] 18  SpO2:  [93 %-99 %] 97 %  BP: (125-177)/(68-88) 154/72                          Neurosurgery Physical Exam   General: well developed, well nourished, no distress.   Head: normocephalic, atraumatic  Neck: No tracheal deviation. No palpable masses.   Neurologic: Alert and oriented. Thought content appropriate.  GCS: Motor: 6/Verbal: 5/Eyes: 4 GCS Total: 15  Mental Status: Awake, Alert, Oriented x 4  Language: No aphasia  Speech: No dysarthria  Cranial nerves: face symmetric, tongue midline, CN II-XII grossly intact.   Eyes: pupils equal, round, reactive to light with accomodation, EOMI.   Ears: No drainage.   Pulmonary: normal respirations, no signs of respiratory distress  Abdomen: soft, non-distended, not tender to palpation  Sensory: intact to light touch throughout  Motor Strength: Moves all extremities spontaneously with good tone.  Pain limited weakness in BLE. No abnormal movements seen.      Strength   Deltoids Triceps Biceps Wrist Extension Wrist Flexion Hand    Upper:  R 5/5 5/5 5/5 5/5 5/5 5/5     L 5/5 5/5 5/5 5/5 5/5 5/5       Iliopsoas Quadriceps Knee  Flexion Tibialis  anterior Gastro- cnemius EHL   Lower: R 4/5 4/5 4/5 4/5 4/5 4/5     L 4/5 4/5 4/5 4/5 4/5 4/5      Hough: absent  Clonus: absent  Vascular: Pulses 2+ and symmetric radial and dorsalis pedis. No LE edema.   Skin: Skin is warm, dry and intact.      Significant Labs:  Recent Labs   Lab 01/10/20  0357 01/11/20  0527   * 101    142   K 4.4 4.2   * 111*   CO2 23 25   BUN 25* 19   CREATININE 1.2 1.2   CALCIUM 12.0* 10.4     Recent Labs   Lab 01/10/20  0357 01/11/20  0527   WBC 5.95 4.80   HGB 11.3* 11.0*   HCT 38.5* 36.8*    240       Significant Diagnostics:  No results found in the last 24 hours.

## 2020-01-11 NOTE — PLAN OF CARE
Recrosendo'd pt in bed AAOx4 with family at bedside.  Pt expresses fear of losing control of his pain.  Discussed the POC including the plan for pain management this shift.  Pt and his family verbalize understanding and agree with the POC.  All questions answered to the pt and his family's desired level of satisfaction.  2009 MD paged and tylenol order clarified per pt request because the pt and his family thought that the order was to be given IV not PO.  Order clarified and medication given PO per MD order.  Pt and his family would like to discuss the possibility of changing the order to IV when the MD rounds in AM.  Pt was able to rest and pain was mostly managed to the pt's desired level of satisfaction.  Monitoring.    Problem: Adult Inpatient Plan of Care  Goal: Plan of Care Review  Outcome: Ongoing, Progressing  Goal: Patient-Specific Goal (Individualization)  Outcome: Ongoing, Progressing  Goal: Absence of Hospital-Acquired Illness or Injury  Outcome: Ongoing, Progressing  Goal: Optimal Comfort and Wellbeing  Outcome: Ongoing, Progressing     Problem: Fall Injury Risk  Goal: Absence of Fall and Fall-Related Injury  Outcome: Ongoing, Progressing     Problem: Pain Acute  Goal: Optimal Pain Control  Outcome: Ongoing, Progressing

## 2020-01-13 NOTE — ASSESSMENT & PLAN NOTE
Patient does have hypercalcemia most likely from malignancy in the setting of metastatic RCC that has been present since 11/2019 per chart review. Ca on admissino 12 (corrected for albumin at 12.5). No symptoms    Plan:  - s/p Zometa 4 mg IV once. (1/9/20).  - Calcium today down to 10.2  - continue to monitor, D/c fluid

## 2020-01-13 NOTE — SUBJECTIVE & OBJECTIVE
Interval History: NAEON Pending pain management recs. OR Tuesday for resection.     Medications:  Continuous Infusions:   [START ON 1/14/2020] sodium chloride 0.9%       Scheduled Meds:   acetaminophen  1,000 mg Oral Q6H    amLODIPine  5 mg Oral Daily    finasteride  5 mg Oral Daily    gabapentin  300 mg Oral TID    lidocaine  2 patch Transdermal Q24H    pantoprazole  40 mg Oral Daily    polyethylene glycol  17 g Oral Daily    tamsulosin  0.4 mg Oral Daily    tiZANidine  2 mg Oral Q8H     PRN Meds:HYDROmorphone, HYDROmorphone, HYDROmorphone, senna-docusate 8.6-50 mg     Review of Systems  Objective:     Weight: 68.1 kg (150 lb 2.1 oz)  Body mass index is 24.98 kg/m².  Vital Signs (Most Recent):  Temp: 98.4 °F (36.9 °C) (01/13/20 1247)  Pulse: 81 (01/13/20 1247)  Resp: 18 (01/13/20 1247)  BP: (!) 153/72 (01/13/20 1247)  SpO2: 97 % (01/13/20 1247) Vital Signs (24h Range):  Temp:  [97.9 °F (36.6 °C)-98.7 °F (37.1 °C)] 98.4 °F (36.9 °C)  Pulse:  [76-93] 81  Resp:  [16-18] 18  SpO2:  [95 %-98 %] 97 %  BP: (122-173)/(61-85) 153/72                          Neurosurgery Physical Exam   General: well developed, well nourished, no distress.   Head: normocephalic, atraumatic  Neck: No tracheal deviation. No palpable masses.   Neurologic: Alert and oriented. Thought content appropriate.  GCS: Motor: 6/Verbal: 5/Eyes: 4 GCS Total: 15  Mental Status: Awake, Alert, Oriented x 4  Language: No aphasia  Speech: No dysarthria  Cranial nerves: face symmetric, tongue midline, CN II-XII grossly intact.   Eyes: pupils equal, round, reactive to light with accomodation, EOMI.   Ears: No drainage.   Pulmonary: normal respirations, no signs of respiratory distress  Abdomen: soft, non-distended, not tender to palpation  Sensory: intact to light touch throughout  Motor Strength: Moves all extremities spontaneously with good tone.  Pain limited weakness in BLE. No abnormal movements seen.      Strength   Deltoids Triceps Biceps Wrist  Extension Wrist Flexion Hand    Upper: R 5/5 5/5 5/5 5/5 5/5 5/5     L 5/5 5/5 5/5 5/5 5/5 5/5       Iliopsoas Quadriceps Knee  Flexion Tibialis  anterior Gastro- cnemius EHL   Lower: R 4/5 4/5 4/5 4/5 4/5 4/5     L 4/5 4/5 4/5 4/5 4/5 4/5      Hough: absent  Clonus: absent  Vascular: Pulses 2+ and symmetric radial and dorsalis pedis. No LE edema.   Skin: Skin is warm, dry and intact.      Significant Labs:  Recent Labs   Lab 01/12/20  0356 01/13/20  0506    113*    139   K 4.2 4.0    108   CO2 25 21*   BUN 15 16   CREATININE 1.2 1.2   CALCIUM 10.2 10.3     Recent Labs   Lab 01/12/20  0356 01/13/20  0506   WBC 6.06 7.23   HGB 10.2* 11.4*   HCT 33.8* 36.4*    244     Recent Labs   Lab 01/13/20  0845   INR 1.0   APTT 28.6     Microbiology Results (last 7 days)     ** No results found for the last 168 hours. **

## 2020-01-13 NOTE — SUBJECTIVE & OBJECTIVE
Interval History: NAEON. Vitals and labs stable. Ca remains WNL. Pt resting in bed, no family at bedside. Reports back pain and BLE pain somewhat improved today. Denies weakness, paresthesias, n/v, and b/b dysfunction. Plan for OR tomorrow for lumbar laminectomy and intradural tumor resection. NPO at midnight. All questions answered.    Medications:  Continuous Infusions:   [START ON 1/14/2020] sodium chloride 0.9%       Scheduled Meds:   acetaminophen  1,000 mg Oral Q6H    amLODIPine  5 mg Oral Daily    finasteride  5 mg Oral Daily    gabapentin  300 mg Oral TID    lidocaine  2 patch Transdermal Q24H    pantoprazole  40 mg Oral Daily    polyethylene glycol  17 g Oral Daily    tamsulosin  0.4 mg Oral Daily    tiZANidine  2 mg Oral Q8H     PRN Meds:HYDROmorphone, HYDROmorphone, HYDROmorphone, senna-docusate 8.6-50 mg     Review of Systems   Constitutional: Negative for chills and fever.   Eyes: Negative for photophobia and visual disturbance.   Respiratory: Negative for cough and shortness of breath.    Gastrointestinal: Negative for abdominal pain, nausea and vomiting.   Genitourinary: Negative for difficulty urinating and enuresis.   Musculoskeletal: Positive for back pain and gait problem. Negative for neck pain.   Neurological: Negative for dizziness, weakness, light-headedness, numbness and headaches.   Psychiatric/Behavioral: Negative for behavioral problems and confusion.     Objective:     Weight: 68.1 kg (150 lb 2.1 oz)  Body mass index is 24.98 kg/m².  Vital Signs (Most Recent):  Temp: 98.2 °F (36.8 °C) (01/13/20 0720)  Pulse: 88 (01/13/20 0748)  Resp: 16 (01/13/20 0720)  BP: 122/61 (01/13/20 0720)  SpO2: 95 % (01/13/20 0720) Vital Signs (24h Range):  Temp:  [97.6 °F (36.4 °C)-98.7 °F (37.1 °C)] 98.2 °F (36.8 °C)  Pulse:  [76-93] 88  Resp:  [16-18] 16  SpO2:  [95 %-98 %] 95 %  BP: (122-173)/(61-85) 122/61                          Neurosurgery Physical Exam    General: well developed, well  nourished, no distress.   Head: normocephalic, atraumatic  Neck: No tracheal deviation. No palpable masses.   Neurologic: Alert and oriented. Thought content appropriate.  GCS: Motor: 6/Verbal: 5/Eyes: 4 GCS Total: 15  Mental Status: Awake, Alert, Oriented x 4  Language: No aphasia  Speech: No dysarthria  Cranial nerves: face symmetric, tongue midline, CN II-XII grossly intact.   Eyes: pupils equal, round, reactive to light with accomodation, EOMI.   Ears: No drainage.   Pulmonary: normal respirations, no signs of respiratory distress  Abdomen: soft, non-distended, not tender to palpation  Sensory: intact to light touch throughout  Motor Strength: Moves all extremities spontaneously with good tone.  Pain limited weakness in BLE. No abnormal movements seen.      Strength   Deltoids Triceps Biceps Wrist Extension Wrist Flexion Hand    Upper: R 5/5 5/5 5/5 5/5 5/5 5/5     L 5/5 5/5 5/5 5/5 5/5 5/5       Iliopsoas Quadriceps Knee  Flexion Tibialis  anterior Gastro- cnemius EHL   Lower: R 4/5 4/5 4/5 4/5 4/5 4/5     L 4/5 4/5 4/5 4/5 4/5 4/5      Hough: absent  Clonus: absent  Vascular: Pulses 2+ and symmetric radial and dorsalis pedis. No LE edema.   Skin: Skin is warm, dry and intact.      Significant Labs:  Recent Labs   Lab 01/12/20  0356 01/13/20  0506    113*    139   K 4.2 4.0    108   CO2 25 21*   BUN 15 16   CREATININE 1.2 1.2   CALCIUM 10.2 10.3     Recent Labs   Lab 01/12/20  0356 01/13/20  0506   WBC 6.06 7.23   HGB 10.2* 11.4*   HCT 33.8* 36.4*    244     Recent Labs   Lab 01/13/20  0845   INR 1.0   APTT 28.6     Microbiology Results (last 7 days)     ** No results found for the last 168 hours. **        All pertinent labs from the last 24 hours have been reviewed.    Significant Diagnostics:  I have reviewed and interpreted all pertinent imaging results/findings within the past 24 hours.

## 2020-01-13 NOTE — ASSESSMENT & PLAN NOTE
70 yo male patient with history of stage IV RCC s/p right Nephrectomy in 2013, with metastasis to lung found on lung biopsy in 08/2018, and further extension to to pancreas and bones noticed on CT from March 4 2019, coming as a transfer from [place] for NSGY evaluation due to L2 spinal lesion found on MRI, suspicious for metastasis vs neurofibroma. Hospital Medicine was consulted for surgical clearance. Patient denies any history of DM, HTN, strokes, CAD, PAD, COPD or any comorbidity other than BPH treated with flomax. He lives by himself and is independent for ADLS. No sx/sx of heart failure. No EKG or prior echos in chart.    Plan:   - Surgical risk assessed. See image on note (1/9/20).

## 2020-01-13 NOTE — ASSESSMENT & PLAN NOTE
Palliative medicine consulted for goals of care and advanced care planning. APRN and LCSW at bedside for follow up to goals of care.     Impression: Mr. Richard is a 68 yo gentleman with PMH of metastatic renal cell carcinoma now with mets to spine and new pancreatic lesion.  Admitted for neurosurgery evaluation.  He is awake, alert and oriented to person, place, time and situation.  Reports no shortness of breath and mild pain at this time 4/10 to lower back and legs. No acute distress.    Advance Care Planning     - no advanced directives have been received.   - advanced care planning education provided - how to start the conversation provided.   Mr. Richard would like to discuss further with his daughter before completing docuements.   - legal next of kin for medical decisions if he is unable are his 4 adult children.    - reports  His daughter Tayla Paula [phone] should be called first   - Full code per primary service.    - Discussed the risks and benefits of CPR  - States he would choose to be left in God's hands and have a natural death.     Goals of Care  - daughter is not available today, will be back 1/14/2020  - to surgery tomorrow, will continue to discuss goals of care after surgery   - Discussed continuum of palliative medicine if he does not pursue cancer treatment including home palliative care - for symptom management, advanced care planning, continued goals of care and hospice care when appropriate.  - Mr. Richard amenable to home palliative care and lives within close proximity to [facility] which has an outpatient clinic.  Agreeable to referral      Plan/Recommendations:   - Continue current pain management plan. Will reassess post operatively   - Appears amenable to DNR order - primary team to assess and write orders as appropriate  - would benefit from continued information and education regarding clinical condtiion  - Amenable to home palliative care and or out patient  palliative care  - [facility] will facilitate referral   - Palliative medicine will continue to follow for goals of care and advanced care planning.

## 2020-01-13 NOTE — NURSING
Plan of care reviewed with patient. Pt complained of pain throughout night. Treated with PRN pain medication. Slept periodically throughout the night. Safety measures maintained. Vital signs stable. Will continue to monitor.

## 2020-01-13 NOTE — ASSESSMENT & PLAN NOTE
Pt is a 69yom with pmh RCC s/p nephrectomy 2013 now with known pulmonary metastasis who presented to outside hospital with acutely worsening back pain with spells of tussive lower extremity pain and weakness. MRI revealed a homogenously enhancing lesion in the lumbar spine. Pt transferred for neurosurgical evaluation.    - Neurologically stable on exam  - All labs and diagnostics personally reviewed  - Neuro checks q4h  - MRI lumbar spine w contrast reveals homogenously enhancing intradural mass at L2. L4-5 disc bulge and severe facet arthropathy at this level result in severe canal stenosis and severe right neural foraminal narrowing and moderate left neural foraminal narrowing.   - MRI C/T spine w contrast reviewed. No enhancing intradural or bony metastasis noted within the cervical or thoracic spine, no significant canal compromise or cord signal changes. Intramuscular lesion noted in right paraspinal muscles of cervical region.   - Plan for OR Tuesday 1/14 for L2 laminectomy for intradural tumor resection and laminectomy at L4-5 for decompression. Discussed with patient and family at bedside, all in agreement to plan. All questions answered.   - NPO at midnight.   - Back Pain/Neurogenic Claudication: Acute Pain Service following due to uncontrolled pain, appreciate assistance:   - Continue scheduled Tylenol 1000mg q6h.   - Gabapentin 300mg TID.  Titrate as tolerated / as renal function tolerates.   - Tizanidine 2mg TID.  Titrate as tolerated / as renal function tolerates.   - Lidocaine patches x2 to back.   - Dilaudid 2mg po q3h PRN moderate pain   - Dilaudid 4mg po q3h PRN severe pain   - Dilaudid 0.5mg IV q2h PRN breakthrough pain  - Metastatic RCC: Pt has a hx of metastatic RCC (diffuse bony mets, pulmonary mets) and new pancreatic mass that has not responded to chemotherapy.    - Last visit w/ oncologist Dr. Anna 3/8/2019, pt elected not to have a biopsy done of the pancreatic mass to determine if it is  "pancreatic cancer. He elected to stop chemotherapy and end of life counseling/hospice was discussed. Per Dr. Anna's note, "The patient communicated that if he were comatose and had little chance of a meaningful recovery, he would not want machines/life-sustaining treatments used."    - Medical oncology saw patient 1/9, appreciate assistance. Pt does not want biopsy at this time for pancreatic mass and would like to focus on treatment for back pain. Will follow-up with his oncologist at discharge.   - Preoperative Clearance: Documented risk assessment by the [employer] found in HM Consult note dated 1/9/2019.  - Hypercalcemia: 2/2 malignancy. Appreciate HM management. s/p Zometa 4 mg IV once (1/9/20). Ca 10.3 today, WNL, off IVF.  - HTN: Not on any home meds for BP, has been elevated since admission. Pain likely contributory. HM following, appreciate recs. Continue amlodipine 5mg daily.  - Palliative Care Encounter: Palliative care team consulted, appreciate assistance:  - Continue current pain management plan. Will reassess post operatively   - Appears amenable to DNR order - primary team to assess and write orders as appropriate  - would benefit from continued information and education regarding clinical condtiion  - Amenable to home palliative care - [facility] will facilitate referral   - Palliative medicine will continue to follow for goals of care and advanced care planning.    -DVT prophylaxis: KENTON's, SCD's  -Bowel regimen: senna and miralax            "

## 2020-01-13 NOTE — PLAN OF CARE
Patient to go to surgery tomorrow and then therp     01/13/20 4000   Discharge Reassessment   Assessment Type Discharge Planning Reassessment   Provided patient/caregiver education on the expected discharge date and the discharge plan Yes   Do you have any problems affording any of your prescribed medications? No   Discharge Plan A Home Health   Discharge Plan B Rehab   DME Needed Upon Discharge  none   Patient choice form signed by patient/caregiver N/A   Anticipated Discharge Disposition Home-Health   Can the patient answer the patient profile reliably? Yes, cognitively intact   How does the patient rate their overall health at the present time? Fair   Describe the patient's ability to walk at the present time. Minor restrictions or changes   y will evaluate.

## 2020-01-13 NOTE — ANESTHESIA PREPROCEDURE EVALUATION
[facility]  Anesthesia Pre-Operative Evaluation         Patient Name: Raymundo Richard  YOB: 1950  MRN: [id number]    SUBJECTIVE:     Pre-operative evaluation for Procedure(s) (LRB):  L2-3 laminectomy for resection of intradural mass and L4-5 laminectomy; Microscope and micro instruments and neuromonitoring.  No instrumentation hardware (N/A)     01/13/2020    Raymundo Richard is a 69 y.o. male w/ a significant PMHx of RCC s/p nephrectomy 2013 now with known pulmonary mets. Presented from OSH with back pain and LE weakness. Imaging revealed a lesion in the lumbar spine.    Patient now presents for the above procedure(s).      LDA:        Peripheral IV - Single Lumen 01/07/20 20 G Right Forearm (Active)        Peripheral IV - Single Lumen 01/10/20 2200 22 G Anterior;Left Forearm (Active)     Prev airway: None documented.    Drips:    [START ON 1/14/2020] sodium chloride 0.9%         Patient Active Problem List   Diagnosis    Pulmonary nodule    Renal cell carcinoma    Metastatic renal cell carcinoma    Chronic midline low back pain    Back pain    Preoperative clearance    Hypercalcemia of malignancy    Palliative care encounter    Pre-op testing    Intradural mass    Lumbar foraminal stenosis    Back pain of lumbar region with sciatica    Advanced care planning/counseling discussion    Goals of care, counseling/discussion    Hypertension       Review of patient's allergies indicates:   Allergen Reactions    Other Nausea And Vomiting     SODIUM PENATHOL  CAUSES SEVERE N & V       Current Inpatient Medications:   acetaminophen  1,000 mg Oral Q6H    amLODIPine  5 mg Oral Daily    diazePAM  5 mg Oral Once    finasteride  5 mg Oral Daily    gabapentin  300 mg Oral TID    lidocaine  2 patch Transdermal Q24H    pantoprazole  40 mg Oral Daily    polyethylene glycol  17 g Oral Daily    tamsulosin  0.4 mg Oral Daily    tiZANidine  2 mg Oral Q8H       No current  facility-administered medications on file prior to encounter.      Current Outpatient Medications on File Prior to Encounter   Medication Sig Dispense Refill    finasteride (PROSCAR) 5 mg tablet Take 5 mg by mouth once daily.      gabapentin (NEURONTIN) 300 MG capsule Take 600 mg by mouth 2 (two) times daily.      HYDROcodone-acetaminophen (NORCO)  mg per tablet Take 1 tablet by mouth every 8 (eight) hours as needed for Pain. 90 tablet 0    HYDROcodone-acetaminophen (NORCO) 5-325 mg per tablet Take 1 tablet by mouth every 6 (six) hours as needed for Pain. 20 tablet 0    ibuprofen (ADVIL,MOTRIN) 600 MG tablet Take 1 tablet (600 mg total) by mouth every 6 (six) hours as needed for Pain. 20 tablet 0    pantoprazole (PROTONIX) 40 MG tablet Take 1 tablet (40 mg total) by mouth once daily. 90 tablet 3    predniSONE (DELTASONE) 20 MG tablet Take 40 mg by mouth once daily. For 5 days      predniSONE (DELTASONE) 20 MG tablet Take 1 tablet (20 mg total) by mouth 2 (two) times daily. for 5 days 10 tablet 0    saw palmetto frt/phytosterol 2 (PROSTATE SR) 160-250 mg Cap Take 1 capsule by mouth 2 (two) times daily.      tamsulosin (FLOMAX) 0.4 mg Cap Take 0.4 mg by mouth once daily.      terbinafine HCl (LAMISIL) 250 mg tablet Take 250 mg by mouth once daily. For 10 days. Pt takes from 12th  of month to the 22 nd as per pt         Past Surgical History:   Procedure Laterality Date    APPENDECTOMY      HERNIA REPAIR      NEPHRECTOMY         Social History     Socioeconomic History    Marital status:      Spouse name: Not on file    Number of children: Not on file    Years of education: Not on file    Highest education level: Not on file   Occupational History    Not on file   Social Needs    Financial resource strain: Not on file    Food insecurity:     Worry: Not on file     Inability: Not on file    Transportation needs:     Medical: Not on file     Non-medical: Not on file   Tobacco Use     Smoking status: Never Smoker   Substance and Sexual Activity    Alcohol use: No    Drug use: No    Sexual activity: Not on file   Lifestyle    Physical activity:     Days per week: Not on file     Minutes per session: Not on file    Stress: Not at all   Relationships    Social connections:     Talks on phone: Not on file     Gets together: Not on file     Attends Oriental orthodox service: Not on file     Active member of club or organization: Not on file     Attends meetings of clubs or organizations: Not on file     Relationship status: Not on file   Other Topics Concern    Not on file   Social History Narrative    Not on file       OBJECTIVE:     Vital Signs Range (Last 24H):  Temp:  [36.4 °C (97.6 °F)-37.1 °C (98.7 °F)]   Pulse:  [76-93]   Resp:  [16-18]   BP: (122-173)/(61-85)   SpO2:  [95 %-98 %]       Significant Labs:  Lab Results   Component Value Date    WBC 7.23 01/13/2020    HGB 11.4 (L) 01/13/2020    HCT 36.4 (L) 01/13/2020     01/13/2020    CHOL 157 01/09/2018    TRIG 81 01/09/2018    HDL 24 (L) 01/09/2018    ALT 13 01/07/2020    AST 17 01/07/2020     01/13/2020    K 4.0 01/13/2020     01/13/2020    CREATININE 1.2 01/13/2020    BUN 16 01/13/2020    CO2 21 (L) 01/13/2020    TSH 2.04 11/25/2019    PSA 6.04 (H) 10/03/2012    INR 1.2 01/07/2020       Diagnostic Studies: No relevant studies.    EKG: No results found for this or any previous visit.    ECHOCARDIOGRAM:  TTE:  No results found for this or any previous visit.      ASSESSMENT/PLAN:         Anesthesia Evaluation    I have reviewed the Patient Summary Reports.        Review of Systems  Anesthesia Hx:  No problems with previous Anesthesia  History of prior surgery of interest to airway management or planning:  Denies Personal Hx of Anesthesia complications.   Social:  Non-Smoker    Hematology/Oncology:        Current/Recent Cancer. Oncology Comments: RCC with known pulmonary, spine mets   Cardiovascular:   Hypertension     Renal/:   RCC s/p nephrectomy 2013   Hepatic/GI:   GERD    Musculoskeletal:  Musculoskeletal Normal    Psych:  Psychiatric Normal           Physical Exam  General:  Well nourished    Airway/Jaw/Neck:  Airway Findings: Mouth Opening: Normal Tongue: Normal  General Airway Assessment: Adult  Mallampati: II  TM Distance: Normal, at least 6 cm  Jaw/Neck Findings:  Neck ROM: Normal ROM      Dental:  Dental Findings: In tact   Chest/Lungs:  Chest/Lungs Findings: Clear to auscultation, Normal Respiratory Rate     Heart/Vascular:  Heart Findings: Rate: Normal  Rhythm: Regular Rhythm        Mental Status:  Mental Status Findings:  Cooperative, Alert and Oriented         Anesthesia Plan  Type of Anesthesia, risks & benefits discussed:  Anesthesia Type:  general  Patient's Preference:   Intra-op Monitoring Plan: standard ASA monitors  Intra-op Monitoring Plan Comments:   Post Op Pain Control Plan: per primary service following discharge from PACU and multimodal analgesia  Post Op Pain Control Plan Comments:   Induction:   IV  Beta Blocker:  Patient is not currently on a Beta-Blocker (No further documentation required).       Informed Consent: Patient understands risks and agrees with Anesthesia plan.  Questions answered. Anesthesia consent signed with patient.  ASA Score: 3     Day of Surgery Review of History & Physical:    H&P update referred to the surgeon.     Anesthesia Plan Notes: Discussed with patient and his brother at bedside his recent discussions with palliative medicine concerning code status. Patient states he is full code and desires to remain full code status herb-operatively.        Ready For Surgery From Anesthesia Perspective.

## 2020-01-13 NOTE — PROGRESS NOTES
[facility]  Palliative Medicine  Progress Note    Patient Name: Raymundo Richard  MRN: [id number]  Admission Date: 1/8/2020  Hospital Length of Stay: 5 days  Code Status: Full Code   Attending Provider: Hernandez Freedman MD  Consulting Provider: PAULA Covington  Primary Care Physician: Sekou Lara III, MD  Principal Problem:Preoperative clearance    Patient information was obtained from patient.      Assessment/Plan:     Palliative care encounter  Palliative medicine consulted for goals of care and advanced care planning. APRN and LCSW at bedside for follow up to goals of care.     Impression: Mr. Richard is a 68 yo gentleman with PMH of metastatic renal cell carcinoma now with mets to spine and new pancreatic lesion.  Admitted for neurosurgery evaluation.  He is awake, alert and oriented to person, place, time and situation.  Reports no shortness of breath and mild pain at this time 4/10 to lower back and legs. No acute distress.    Advance Care Planning     - no advanced directives have been received.   - advanced care planning education provided - how to start the conversation provided.   Mr. Richard would like to discuss further with his daughter before completing docuements.   - legal next of kin for medical decisions if he is unable are his 4 adult children.    - reports  His daughter Tayla Paula [phone] should be called first   - Full code per primary service.    - Discussed the risks and benefits of CPR  - States he would choose to be left in God's hands and have a natural death.     Goals of Care  - daughter is not available today, will be back 1/14/2020  - to surgery tomorrow, will continue to discuss goals of care after surgery   - Discussed continuum of palliative medicine if he does not pursue cancer treatment including home palliative care - for symptom management, advanced care planning, continued goals of care and hospice care when appropriate.  - Mr. Richard amenable to  home palliative care and lives within close proximity to [facility] which has an outpatient clinic.  Agreeable to referral      Plan/Recommendations:   - Continue current pain management plan. Will reassess post operatively   - Appears amenable to DNR order - primary team to assess and write orders as appropriate  - would benefit from continued information and education regarding clinical condtiion  - Amenable to home palliative care and or out patient palliative care  - pal med will facilitate referral   - Palliative medicine will continue to follow for goals of care and advanced care planning.              I will follow-up with patient. Please contact us if you have any additional questions.    Subjective:     Chief Complaint:   Chief Complaint   Patient presents with    Back Pain       HPI:   68 yo male patient with history of stage IV RCC s/p right Nephrectomy in 2013, with metastasis to lung found on lung biopsy in 08/2018, and further extension to to pancreas and bones noticed on CT from March 4 2019, coming as a transfer from [place] for NSGY evaluation.   He went to the ER in [place] on 1/7 due to a 1 month history of worsening back pain associated with LE weakness, MRI of the spine revealed a hyperintense lesion in T2 concerning for neurofibroma, however, given his metastatic disease he was referred here for further evaluation by NSGY. Hospital Medicine was consulted for surgical clearance.     Palliative care consulted for goals of care and advanced care planning     Hospital Course:  No notes on file    Interval History:     Past Medical History:   Diagnosis Date    GERD (gastroesophageal reflux disease)     Renal cell carcinoma        Past Surgical History:   Procedure Laterality Date    APPENDECTOMY      HERNIA REPAIR      NEPHRECTOMY         Review of patient's allergies indicates:   Allergen Reactions    Other Nausea And Vomiting     SODIUM PENATHOL  CAUSES SEVERE N & V        Medications:  Continuous Infusions:   [START ON 1/14/2020] sodium chloride 0.9%       Scheduled Meds:   acetaminophen  1,000 mg Oral Q6H    amLODIPine  5 mg Oral Daily    finasteride  5 mg Oral Daily    gabapentin  300 mg Oral TID    lidocaine  2 patch Transdermal Q24H    pantoprazole  40 mg Oral Daily    polyethylene glycol  17 g Oral Daily    tamsulosin  0.4 mg Oral Daily    tiZANidine  2 mg Oral Q8H     PRN Meds:HYDROmorphone, HYDROmorphone, HYDROmorphone, senna-docusate 8.6-50 mg    Family History     None        Tobacco Use    Smoking status: Never Smoker   Substance and Sexual Activity    Alcohol use: No    Drug use: No    Sexual activity: Not on file       Review of Systems   Constitutional: Positive for activity change and fatigue.   Musculoskeletal: Positive for back pain.   Neurological: Positive for weakness and numbness.   Psychiatric/Behavioral: Negative for agitation, behavioral problems and confusion.     Objective:     Vital Signs (Most Recent):  Temp: 98.2 °F (36.8 °C) (01/13/20 0720)  Pulse: 88 (01/13/20 1116)  Resp: 16 (01/13/20 0720)  BP: 122/61 (01/13/20 0720)  SpO2: 95 % (01/13/20 0720) Vital Signs (24h Range):  Temp:  [97.6 °F (36.4 °C)-98.7 °F (37.1 °C)] 98.2 °F (36.8 °C)  Pulse:  [76-93] 88  Resp:  [16-18] 16  SpO2:  [95 %-98 %] 95 %  BP: (122-173)/(61-85) 122/61     Weight: 68.1 kg (150 lb 2.1 oz)  Body mass index is 24.98 kg/m².    Review of Symptoms  Symptom Assessment (ESAS 0-10 scale)   ESAS 0 1 2 3 4 5 6 7 8 9 10   Pain      x        Dyspnea x             Anxiety x             Nausea x             Depression  x             Anorexia x             Fatigue    x          Insomnia x             Restlessness  x             Agitation x             CAM / Delirium __ --  ___+   Constipation     __ --  ___+   Diarrhea           __ --  ___+  Bowel Management Plan (BMP): yes    Comments:   Pain Assessment:complains of moderate to severe 5-8/10 intermittent pain to legs and  "lower back.  Associated with "disc problems" and newly discovered mass.  Pain is impacting mobility.  Relieved with pain medications       OME in 24 hours: 33    Performance Status: 50    ECOG Performance Status Grade: 1 - Ambulates, capable of light work    Physical Exam   Constitutional: He is oriented to person, place, and time. He appears well-developed and well-nourished. No distress.   HENT:   Head: Normocephalic and atraumatic.   Cardiovascular: Normal rate, regular rhythm and normal heart sounds.   Pulmonary/Chest: Effort normal and breath sounds normal. No respiratory distress.   Abdominal: Soft. Bowel sounds are normal.   Musculoskeletal:   Back pain, uses walker    Neurological: He is alert and oriented to person, place, and time.   Skin: Skin is warm and dry.   Psychiatric: He has a normal mood and affect. His behavior is normal. Judgment and thought content normal.   Nursing note and vitals reviewed.      Significant Labs: All pertinent labs within the past 24 hours have been reviewed.  CBC:   Recent Labs   Lab 01/13/20  0506   WBC 7.23   HGB 11.4*   HCT 36.4*   MCV 86        BMP:  Recent Labs   Lab 01/13/20  0506   *      K 4.0      CO2 21*   BUN 16   CREATININE 1.2   CALCIUM 10.3     LFT:  Lab Results   Component Value Date    AST 17 01/07/2020    ALKPHOS 49 (L) 01/07/2020    BILITOT 0.6 01/07/2020     Albumin:   Albumin   Date Value Ref Range Status   01/07/2020 3.4 (L) 3.5 - 5.2 g/dL Final   10/03/2012 4.1 3.6 - 5.1 g/dL Final     Comment:     Albumin:  THIS TEST WAS PERFORMED AT:  [facility]  [address] [place] [address]  NIKKY Strickland MD, PhD     Protein:   Total Protein   Date Value Ref Range Status   01/07/2020 7.7 6.0 - 8.4 g/dL Final     Lactic acid:   No results found for: LACTATE    Significant Imaging: I have reviewed all pertinent imaging results/findings within the past 24 hours.    Advance Care Planning " "  Advanced Directives::  Living Will: No  LaPOST: No  Do Not Resuscitate Status: No  Medical Power of : No    Decision-Making Capacity: Patient answered questions       Living Arrangements: Lives with family    Psychosocial/Cultural: , 4 adult children,  Worked as a , enjoys going to Sikh and cooking   Patient's most important priorities:  Is to have pain controlled.  Goals do not include taking any systemic chemotherapy or immunotherapy.  States previous experience made him very sick.      Patient's biggest concerns/fears: States it is not important to know what this is - new cancer, if it will mean procedures and drugs that will take away his quality of life.  If it is a cancer he has already made decision not to treat.  If this would be  Cause of  his death, he believes this is God's will.  He does not intend to be sick from drugs if it will not keep him from dying.     Patient's goals/hopes:  - to be comfortable and has a much quality of life as possible.  Spend time with his family     piritual:     F- Francine and Belief: Uatsdin     I - Importance: "Deep belief in God's will and uses francine to guide decisions"  .  C - Community: attends Sikh services routinely, participates in Sikh activities    A - Address in Care: amenable to  visits and his preacher has been in contact with him.      > 50% of 25  min visit spent in chart review, face to face discussion of goals of care,  symptom assessment, coordination of care and emotional support.    ALEXIS Green, ACNS-BC  Palliative Medicine  [facility]  Ext [phone]            "

## 2020-01-13 NOTE — CONSULTS
Consult acknowledged. Please see full H & P.     Jones Song MD  [facility]

## 2020-01-13 NOTE — PROGRESS NOTES
[facility] Hospital Medicine  Progress Note    Patient Name: Raymundo Richard  MRN: [id number]  Patient Class: IP- Inpatient   Admission Date: 1/8/2020  Length of Stay: 5 days  Attending Physician: Hernandez Freedman MD  Primary Care Provider: Sekou Lara III, MD        Subjective:     Principal Problem:Preoperative clearance    HPI:  70 yo male patient with history of stage IV RCC s/p right Nephrectomy in 2013, with metastasis to lung found on lung biopsy in 08/2018, and further extension to to pancreas and bones noticed on CT from March 4 2019, coming as a transfer from [place] for NSGY evaluation.   He went to the ER in [place] on 1/7 due to a 1 month history of worsening back pain associated with LE weakness, MRI of the spine revealed a hyperintense lesion in T2 concerning for neurofibroma, however, given his metastatic disease he was referred here for further evaluation by NSGY. Hospital Medicine was consulted for surgical clearance.     Patient denies any history of DM, HTN, strokes, CAD, PAD, COPD or any comorbidity other than BPH treated with flomax. He lives by himself and is independent for ADLS. Prior to his back pain and weakness 1 month ago,  he used to do some exercise without limitations. Denies any exertional SOB, chest pain, LE edema. He does mention orthopnea for many years, needing to sleep with some degree of head elevation but states he was told this was from LEONARD. He was prescribed a CPAP many years ago but has not used it in the last 2 years because he has lost weight and feels he does not need it anymore.     Overview/Hospital Course:  No notes on file    Interval History: Pt feels well. Brother at bed side this morning. Pain is tolerable. Ca++ 10.3 today. Plan for procedure tomorrow.     Review of Systems  Objective:     Vital Signs (Most Recent):  Temp: 98.4 °F (36.9 °C) (01/13/20 1247)  Pulse: 81 (01/13/20 1247)  Resp: 18 (01/13/20 1247)  BP: (!) 153/72 (01/13/20  1247)  SpO2: 97 % (01/13/20 1247) Vital Signs (24h Range):  Temp:  [97.9 °F (36.6 °C)-98.7 °F (37.1 °C)] 98.4 °F (36.9 °C)  Pulse:  [76-93] 81  Resp:  [16-18] 18  SpO2:  [95 %-98 %] 97 %  BP: (122-173)/(61-85) 153/72     Weight: 68.1 kg (150 lb 2.1 oz)  Body mass index is 24.98 kg/m².    Intake/Output Summary (Last 24 hours) at 1/13/2020 1410  Last data filed at 1/13/2020 0626  Gross per 24 hour   Intake --   Output 1450 ml   Net -1450 ml      Physical Exam   Constitutional: He is oriented to person, place, and time. He appears well-developed and well-nourished. No distress.   HENT:   Head: Normocephalic and atraumatic.   Mouth/Throat: Oropharynx is clear and moist.   Eyes: Pupils are equal, round, and reactive to light.   Neck: Normal range of motion. Neck supple. No JVD present.   Cardiovascular: Normal rate, regular rhythm and normal heart sounds.   No murmur heard.  Pulmonary/Chest: Effort normal and breath sounds normal. No respiratory distress. He has no wheezes. He has no rales.   Abdominal: Soft. Bowel sounds are normal. He exhibits no distension. There is no tenderness.   Musculoskeletal: Normal range of motion. He exhibits no edema or deformity.   Neurological: He is alert and oriented to person, place, and time. No sensory deficit. He exhibits normal muscle tone.   strenght 4/5 in LEs and 5/5 in upper extremities   Skin: Skin is warm.   Vitals reviewed.      Significant Labs:   CBC:   Recent Labs   Lab 01/12/20  0356 01/13/20  0506   WBC 6.06 7.23   HGB 10.2* 11.4*   HCT 33.8* 36.4*    244     CMP:   Recent Labs   Lab 01/12/20  0356 01/13/20  0506    139   K 4.2 4.0    108   CO2 25 21*    113*   BUN 15 16   CREATININE 1.2 1.2   CALCIUM 10.2 10.3   ANIONGAP 5* 10   EGFRNONAA >60.0 >60.0       Significant Imaging: I have reviewed all pertinent imaging results/findings within the past 24 hours.      Assessment/Plan:      * Preoperative clearance  68 yo male patient with history of  "stage IV RCC s/p right Nephrectomy in 2013, with metastasis to lung found on lung biopsy in 08/2018, and further extension to to pancreas and bones noticed on CT from March 4 2019, coming as a transfer from [place] for NSGY evaluation due to L2 spinal lesion found on MRI, suspicious for metastasis vs neurofibroma. Hospital Medicine was consulted for surgical clearance. Patient denies any history of DM, HTN, strokes, CAD, PAD, COPD or any comorbidity other than BPH treated with flomax. He lives by himself and is independent for ADLS. No sx/sx of heart failure. No EKG or prior echos in chart.    Plan:   - Surgical risk assessed. See image on note (1/9/20).    Hypertension  Pt not on any BP at home. BP here has been in the 140-175 systolics. He is in pain which can be contributing, however given persistently high BP despite pain meds will start him on a regimen.    Plan   - Amlodipine 5 mg daily    Hypercalcemia of malignancy  Patient does have hypercalcemia most likely from malignancy in the setting of metastatic RCC that has been present since 11/2019 per chart review. Ca on admissino 12 (corrected for albumin at 12.5). No symptoms    Plan:  - s/p Zometa 4 mg IV once. (1/9/20).  - Calcium today down to 10.2  - continue to monitor, D/c fluid     Back pain    Pt came with a 1 month history of back pain, MRI of spine shows a hyperintense lesion in L2, possible neurofibroma vs metastatic lesion from his RCC.    Plan   - Pt is at risk of medication accumulation given decreased kidney function. Pt found very somnolent this morning.  - Would change methocarbamol to PRN and decrease Gabapentin to 300 mg BID.   - Avoid NSAIDs given CKD.    Metastatic renal cell carcinoma  Patient with stage IV RCC (mets to lungs, bone and pancreas) despite therapy with sorafenib. Followed by hem/onc, last seen 03/2019 by Dr Anna per chart review. Per Dr Anna's note at that time " We discussed different extreme health states that he could " "experience, and reviewed what kind of medical care he would want in those situations.  The patient communicated that if he were comatose and had little chance of a meaningful recovery, he would not want machines/life-sustaining treatments used.   The patient has  completed a living will to reflect these preferences"    Plan  - F/u Palliative care recs  - Onc f/u as outpatient      VTE Risk Mitigation (From admission, onward)         Ordered     Place KENTON hose  Until discontinued      01/08/20 2135     Place sequential compression device  Until discontinued      01/08/20 2135     IP VTE HIGH RISK PATIENT  Once      01/08/20 2135              Jones Song MD  Department of Hospital Medicine   [facility]                    01/13/2020                             STAFF PHYSICIAN NOTE                                   Attending Attestation for Rounds with Resident  I have reviewed and concur with the resident's history, physical, assessment, and plan.  I have personally interviewed and examined the patient at bedside and agree with the resident's findings.                                     Ella Almanzar MD  Senior Hospitalist  [id number], [phone]        "

## 2020-01-13 NOTE — ANESTHESIA POST-OP PAIN MANAGEMENT
Acute Pain Service Progress Note    Raymundo Richard is a 69 y.o. male with stage IV RCC (s/p right nephrectomy in 2013) with metastasis to lung and bone admitted to the hospital due to lumbar spinal mass.   - Pt is undergoing L2-3 laminectomy for resection of intradural mass and L4-5 laminectomy (scheduled for 1/14/2020)    Problem List:    Active Hospital Problems    Diagnosis  POA    *Preoperative clearance [Z01.818]  Not Applicable    Hypertension [I10]  Unknown    Advanced care planning/counseling discussion [Z71.89]  Not Applicable    Goals of care, counseling/discussion [Z71.89]  Not Applicable    Palliative care encounter [Z51.5]  Not Applicable    Pre-op testing [Z01.818]  Not Applicable    Intradural mass [G95.89]  Yes    Lumbar foraminal stenosis [M48.061]  Yes    Hypercalcemia of malignancy [E83.52]  Unknown    Back pain [M54.9]  Yes    Metastatic renal cell carcinoma [C64.9]  Yes      Resolved Hospital Problems   No resolved problems to display.       Subjective:     General appearance of alert, oriented, no complaints   Pain with rest: 2    Numbers   Pain with movement: 4    Numbers   Side Effects    1. Pruritis No    2. Nausea No    4. Sedation No, 1=awake and alert    Objective:     Vitals   Vitals:    01/13/20 1247   BP: (!) 153/72   Pulse: 81   Resp: 18   Temp: 36.9 °C (98.4 °F)        Labs    Admission on 01/08/2020   Component Date Value Ref Range Status    Group & Rh 01/08/2020 B POS   Final    Indirect Zaki 01/08/2020 NEG   Final    WBC 01/09/2020 8.96  3.90 - 12.70 K/uL Final    RBC 01/09/2020 4.33* 4.60 - 6.20 M/uL Final    Hemoglobin 01/09/2020 11.6* 14.0 - 18.0 g/dL Final    Hematocrit 01/09/2020 37.8* 40.0 - 54.0 % Final    Mean Corpuscular Volume 01/09/2020 87  82 - 98 fL Final    Mean Corpuscular Hemoglobin 01/09/2020 26.8* 27.0 - 31.0 pg Final    Mean Corpuscular Hemoglobin Conc 01/09/2020 30.7* 32.0 - 36.0 g/dL Final    RDW 01/09/2020 13.3  11.5 - 14.5 % Final     Platelets 01/09/2020 262  150 - 350 K/uL Final    MPV 01/09/2020 8.9* 9.2 - 12.9 fL Final    Immature Granulocytes 01/09/2020 0.3  0.0 - 0.5 % Final    Gran # (ANC) 01/09/2020 5.8  1.8 - 7.7 K/uL Final    Immature Grans (Abs) 01/09/2020 0.03  0.00 - 0.04 K/uL Final    Lymph # 01/09/2020 1.9  1.0 - 4.8 K/uL Final    Mono # 01/09/2020 1.1* 0.3 - 1.0 K/uL Final    Eos # 01/09/2020 0.1  0.0 - 0.5 K/uL Final    Baso # 01/09/2020 0.04  0.00 - 0.20 K/uL Final    nRBC 01/09/2020 0  0 /100 WBC Final    Gran% 01/09/2020 64.7  38.0 - 73.0 % Final    Lymph% 01/09/2020 20.8  18.0 - 48.0 % Final    Mono% 01/09/2020 12.2  4.0 - 15.0 % Final    Eosinophil% 01/09/2020 1.6  0.0 - 8.0 % Final    Basophil% 01/09/2020 0.4  0.0 - 1.9 % Final    Differential Method 01/09/2020 Automated   Final    Sodium 01/09/2020 138  136 - 145 mmol/L Final    Potassium 01/09/2020 4.0  3.5 - 5.1 mmol/L Final    Chloride 01/09/2020 106  95 - 110 mmol/L Final    CO2 01/09/2020 24  23 - 29 mmol/L Final    Glucose 01/09/2020 113* 70 - 110 mg/dL Final    BUN, Bld 01/09/2020 28* 8 - 23 mg/dL Final    Creatinine 01/09/2020 1.4  0.5 - 1.4 mg/dL Final    Calcium 01/09/2020 12.0* 8.7 - 10.5 mg/dL Final    Anion Gap 01/09/2020 8  8 - 16 mmol/L Final    eGFR if African American 01/09/2020 58.8* >60 mL/min/1.73 m^2 Final    eGFR if non African American 01/09/2020 50.9* >60 mL/min/1.73 m^2 Final    WBC 01/10/2020 5.95  3.90 - 12.70 K/uL Final    RBC 01/10/2020 4.28* 4.60 - 6.20 M/uL Final    Hemoglobin 01/10/2020 11.3* 14.0 - 18.0 g/dL Final    Hematocrit 01/10/2020 38.5* 40.0 - 54.0 % Final    Mean Corpuscular Volume 01/10/2020 90  82 - 98 fL Final    Mean Corpuscular Hemoglobin 01/10/2020 26.4* 27.0 - 31.0 pg Final    Mean Corpuscular Hemoglobin Conc 01/10/2020 29.4* 32.0 - 36.0 g/dL Final    RDW 01/10/2020 13.6  11.5 - 14.5 % Final    Platelets 01/10/2020 270  150 - 350 K/uL Final    MPV 01/10/2020 8.9* 9.2 - 12.9 fL Final     Immature Granulocytes 01/10/2020 0.3  0.0 - 0.5 % Final    Gran # (ANC) 01/10/2020 3.4  1.8 - 7.7 K/uL Final    Immature Grans (Abs) 01/10/2020 0.02  0.00 - 0.04 K/uL Final    Lymph # 01/10/2020 1.4  1.0 - 4.8 K/uL Final    Mono # 01/10/2020 0.8  0.3 - 1.0 K/uL Final    Eos # 01/10/2020 0.3  0.0 - 0.5 K/uL Final    Baso # 01/10/2020 0.08  0.00 - 0.20 K/uL Final    nRBC 01/10/2020 0  0 /100 WBC Final    Gran% 01/10/2020 57.1  38.0 - 73.0 % Final    Lymph% 01/10/2020 24.0  18.0 - 48.0 % Final    Mono% 01/10/2020 12.6  4.0 - 15.0 % Final    Eosinophil% 01/10/2020 4.7  0.0 - 8.0 % Final    Basophil% 01/10/2020 1.3  0.0 - 1.9 % Final    Differential Method 01/10/2020 Automated   Final    Sodium 01/10/2020 141  136 - 145 mmol/L Final    Potassium 01/10/2020 4.4  3.5 - 5.1 mmol/L Final    Chloride 01/10/2020 111* 95 - 110 mmol/L Final    CO2 01/10/2020 23  23 - 29 mmol/L Final    Glucose 01/10/2020 120* 70 - 110 mg/dL Final    BUN, Bld 01/10/2020 25* 8 - 23 mg/dL Final    Creatinine 01/10/2020 1.2  0.5 - 1.4 mg/dL Final    Calcium 01/10/2020 12.0* 8.7 - 10.5 mg/dL Final    Anion Gap 01/10/2020 7* 8 - 16 mmol/L Final    eGFR if African American 01/10/2020 >60.0  >60 mL/min/1.73 m^2 Final    eGFR if non African American 01/10/2020 >60.0  >60 mL/min/1.73 m^2 Final    WBC 01/11/2020 4.80  3.90 - 12.70 K/uL Final    RBC 01/11/2020 4.11* 4.60 - 6.20 M/uL Final    Hemoglobin 01/11/2020 11.0* 14.0 - 18.0 g/dL Final    Hematocrit 01/11/2020 36.8* 40.0 - 54.0 % Final    Mean Corpuscular Volume 01/11/2020 90  82 - 98 fL Final    Mean Corpuscular Hemoglobin 01/11/2020 26.8* 27.0 - 31.0 pg Final    Mean Corpuscular Hemoglobin Conc 01/11/2020 29.9* 32.0 - 36.0 g/dL Final    RDW 01/11/2020 13.6  11.5 - 14.5 % Final    Platelets 01/11/2020 240  150 - 350 K/uL Final    MPV 01/11/2020 8.8* 9.2 - 12.9 fL Final    Immature Granulocytes 01/11/2020 0.2  0.0 - 0.5 % Final    Gran # (ANC) 01/11/2020 2.9  1.8 -  7.7 K/uL Final    Immature Grans (Abs) 01/11/2020 0.01  0.00 - 0.04 K/uL Final    Lymph # 01/11/2020 0.8* 1.0 - 4.8 K/uL Final    Mono # 01/11/2020 0.6  0.3 - 1.0 K/uL Final    Eos # 01/11/2020 0.3  0.0 - 0.5 K/uL Final    Baso # 01/11/2020 0.07  0.00 - 0.20 K/uL Final    nRBC 01/11/2020 0  0 /100 WBC Final    Gran% 01/11/2020 60.6  38.0 - 73.0 % Final    Lymph% 01/11/2020 17.5* 18.0 - 48.0 % Final    Mono% 01/11/2020 13.3  4.0 - 15.0 % Final    Eosinophil% 01/11/2020 6.9  0.0 - 8.0 % Final    Basophil% 01/11/2020 1.5  0.0 - 1.9 % Final    Differential Method 01/11/2020 Automated   Final    Sodium 01/11/2020 142  136 - 145 mmol/L Final    Potassium 01/11/2020 4.2  3.5 - 5.1 mmol/L Final    Chloride 01/11/2020 111* 95 - 110 mmol/L Final    CO2 01/11/2020 25  23 - 29 mmol/L Final    Glucose 01/11/2020 101  70 - 110 mg/dL Final    BUN, Bld 01/11/2020 19  8 - 23 mg/dL Final    Creatinine 01/11/2020 1.2  0.5 - 1.4 mg/dL Final    Calcium 01/11/2020 10.4  8.7 - 10.5 mg/dL Final    Anion Gap 01/11/2020 6* 8 - 16 mmol/L Final    eGFR if African American 01/11/2020 >60.0  >60 mL/min/1.73 m^2 Final    eGFR if non African American 01/11/2020 >60.0  >60 mL/min/1.73 m^2 Final    WBC 01/12/2020 6.06  3.90 - 12.70 K/uL Final    RBC 01/12/2020 3.79* 4.60 - 6.20 M/uL Final    Hemoglobin 01/12/2020 10.2* 14.0 - 18.0 g/dL Final    Hematocrit 01/12/2020 33.8* 40.0 - 54.0 % Final    Mean Corpuscular Volume 01/12/2020 89  82 - 98 fL Final    Mean Corpuscular Hemoglobin 01/12/2020 26.9* 27.0 - 31.0 pg Final    Mean Corpuscular Hemoglobin Conc 01/12/2020 30.2* 32.0 - 36.0 g/dL Final    RDW 01/12/2020 13.3  11.5 - 14.5 % Final    Platelets 01/12/2020 224  150 - 350 K/uL Final    MPV 01/12/2020 8.6* 9.2 - 12.9 fL Final    Immature Granulocytes 01/12/2020 0.3  0.0 - 0.5 % Final    Gran # (ANC) 01/12/2020 3.3  1.8 - 7.7 K/uL Final    Immature Grans (Abs) 01/12/2020 0.02  0.00 - 0.04 K/uL Final    Lymph #  01/12/2020 1.3  1.0 - 4.8 K/uL Final    Mono # 01/12/2020 0.9  0.3 - 1.0 K/uL Final    Eos # 01/12/2020 0.4  0.0 - 0.5 K/uL Final    Baso # 01/12/2020 0.08  0.00 - 0.20 K/uL Final    nRBC 01/12/2020 0  0 /100 WBC Final    Gran% 01/12/2020 55.1  38.0 - 73.0 % Final    Lymph% 01/12/2020 22.1  18.0 - 48.0 % Final    Mono% 01/12/2020 14.9  4.0 - 15.0 % Final    Eosinophil% 01/12/2020 6.3  0.0 - 8.0 % Final    Basophil% 01/12/2020 1.3  0.0 - 1.9 % Final    Differential Method 01/12/2020 Automated   Final    Sodium 01/12/2020 139  136 - 145 mmol/L Final    Potassium 01/12/2020 4.2  3.5 - 5.1 mmol/L Final    Chloride 01/12/2020 109  95 - 110 mmol/L Final    CO2 01/12/2020 25  23 - 29 mmol/L Final    Glucose 01/12/2020 107  70 - 110 mg/dL Final    BUN, Bld 01/12/2020 15  8 - 23 mg/dL Final    Creatinine 01/12/2020 1.2  0.5 - 1.4 mg/dL Final    Calcium 01/12/2020 10.2  8.7 - 10.5 mg/dL Final    Anion Gap 01/12/2020 5* 8 - 16 mmol/L Final    eGFR if African American 01/12/2020 >60.0  >60 mL/min/1.73 m^2 Final    eGFR if non African American 01/12/2020 >60.0  >60 mL/min/1.73 m^2 Final    WBC 01/13/2020 7.23  3.90 - 12.70 K/uL Final    RBC 01/13/2020 4.25* 4.60 - 6.20 M/uL Final    Hemoglobin 01/13/2020 11.4* 14.0 - 18.0 g/dL Final    Hematocrit 01/13/2020 36.4* 40.0 - 54.0 % Final    Mean Corpuscular Volume 01/13/2020 86  82 - 98 fL Final    Mean Corpuscular Hemoglobin 01/13/2020 26.8* 27.0 - 31.0 pg Final    Mean Corpuscular Hemoglobin Conc 01/13/2020 31.3* 32.0 - 36.0 g/dL Final    RDW 01/13/2020 13.4  11.5 - 14.5 % Final    Platelets 01/13/2020 244  150 - 350 K/uL Final    MPV 01/13/2020 8.6* 9.2 - 12.9 fL Final    Immature Granulocytes 01/13/2020 0.3  0.0 - 0.5 % Final    Gran # (ANC) 01/13/2020 4.8  1.8 - 7.7 K/uL Final    Immature Grans (Abs) 01/13/2020 0.02  0.00 - 0.04 K/uL Final    Lymph # 01/13/2020 1.3  1.0 - 4.8 K/uL Final    Mono # 01/13/2020 0.8  0.3 - 1.0 K/uL Final    Eos #  01/13/2020 0.3  0.0 - 0.5 K/uL Final    Baso # 01/13/2020 0.06  0.00 - 0.20 K/uL Final    nRBC 01/13/2020 0  0 /100 WBC Final    Gran% 01/13/2020 65.8  38.0 - 73.0 % Final    Lymph% 01/13/2020 17.3* 18.0 - 48.0 % Final    Mono% 01/13/2020 11.1  4.0 - 15.0 % Final    Eosinophil% 01/13/2020 4.7  0.0 - 8.0 % Final    Basophil% 01/13/2020 0.8  0.0 - 1.9 % Final    Differential Method 01/13/2020 Automated   Final    Sodium 01/13/2020 139  136 - 145 mmol/L Final    Potassium 01/13/2020 4.0  3.5 - 5.1 mmol/L Final    Chloride 01/13/2020 108  95 - 110 mmol/L Final    CO2 01/13/2020 21* 23 - 29 mmol/L Final    Glucose 01/13/2020 113* 70 - 110 mg/dL Final    BUN, Bld 01/13/2020 16  8 - 23 mg/dL Final    Creatinine 01/13/2020 1.2  0.5 - 1.4 mg/dL Final    Calcium 01/13/2020 10.3  8.7 - 10.5 mg/dL Final    Anion Gap 01/13/2020 10  8 - 16 mmol/L Final    eGFR if African American 01/13/2020 >60.0  >60 mL/min/1.73 m^2 Final    eGFR if non African American 01/13/2020 >60.0  >60 mL/min/1.73 m^2 Final    Prothrombin Time 01/13/2020 10.4  9.0 - 12.5 sec Final    INR 01/13/2020 1.0  0.8 - 1.2 Final    aPTT 01/13/2020 28.6  21.0 - 32.0 sec Final    Group & Rh 01/13/2020 B POS   Final    Indirect Zaki 01/13/2020 NEG   Final        Meds   Current Facility-Administered Medications   Medication Dose Route Frequency Provider Last Rate Last Dose    [START ON 1/14/2020] 0.9%  NaCl infusion   Intravenous Continuous BECKA NoyolaC        acetaminophen tablet 1,000 mg  1,000 mg Oral Q6H Riley Monk MD   1,000 mg at 01/13/20 1201    amLODIPine tablet 5 mg  5 mg Oral Daily Emmanuel Moreno MD   5 mg at 01/13/20 0903    finasteride tablet 5 mg  5 mg Oral Daily Riley Hatfield MD   5 mg at 01/13/20 0903    gabapentin capsule 300 mg  300 mg Oral TID Kennedi Bose MD   300 mg at 01/13/20 0902    HYDROmorphone injection 0.5 mg  0.5 mg Intravenous Q2H PRN Riley Monk MD   0.5 mg at  01/13/20 1205    HYDROmorphone tablet 2 mg  2 mg Oral Q3H PRN Riley Monk MD   2 mg at 01/13/20 0904    HYDROmorphone tablet 4 mg  4 mg Oral Q3H PRN Riley Monk MD        lidocaine 5 % patch 2 patch  2 patch Transdermal Q24H Riley Monk MD   2 patch at 01/13/20 0903    pantoprazole EC tablet 40 mg  40 mg Oral Daily Riley Hatfield MD   40 mg at 01/13/20 0903    polyethylene glycol packet 17 g  17 g Oral Daily Emmanuel Moreno MD   17 g at 01/13/20 0902    senna-docusate 8.6-50 mg per tablet 2 tablet  2 tablet Oral Nightly PRN Riley Hatfield MD        tamsulosin 24 hr capsule 0.4 mg  0.4 mg Oral Daily Riley Hatfield MD   0.4 mg at 01/13/20 0902    tiZANidine tablet 2 mg  2 mg Oral Q8H Riley Monk MD   2 mg at 01/13/20 0623       Assessment:     Pain control adequate    Plan:  - Continue scheduled Tylenol 1000mg q6h.  - Continue gabapentin to 300mg TID.  Titrate as tolerated / as renal function tolerates.  - Start tizanidine 2mg TID.  Titrate as tolerated / as renal function tolerates.  - Continue lidocaine patches x2 to back.  - Continue Dilaudid 2mg po q3h PRN moderate pain  - Continue Dilaudid 4mg po q3h PRN severe pain  - Continue Dilaudid 0.5mg IV q2h PRN breakthrough pain  - We will D/C diazepam and keep tizanidine to avoid alteration in mental status.   - We will follow up with the patient.      Vaishnavi Steel MD  Anesthesiology resident   Cell phone: [phone]  Pager: [phone]

## 2020-01-13 NOTE — PROGRESS NOTES
[facility]  Neurosurgery  Progress Note    Subjective:     History of Present Illness: Pt is a 69yom with pmh RCC s/p nephrectomy 2013 now with known pulmonary metastasis who presented to outside hospital with acutely worsening back pain with spells of tussive lower extremity pain and weakness. MRI revealed a homogenously enhancing lesion in the lumbar spine. Pt transferred for neurosurgical evaluation.    Post-Op Info:  Procedure(s) (LRB):  L2-3 laminectomy for resection of intradural mass and L4-5 laminectomy (N/A)         Interval History: NAEON. Vitals and labs stable. Ca remains WNL. Pt resting in bed, no family at bedside. Reports back pain and BLE pain somewhat improved today. Denies weakness, paresthesias, n/v, and b/b dysfunction. Plan for OR tomorrow for lumbar laminectomy and intradural tumor resection. NPO at midnight. All questions answered.    Medications:  Continuous Infusions:   [START ON 1/14/2020] sodium chloride 0.9%       Scheduled Meds:   acetaminophen  1,000 mg Oral Q6H    amLODIPine  5 mg Oral Daily    finasteride  5 mg Oral Daily    gabapentin  300 mg Oral TID    lidocaine  2 patch Transdermal Q24H    pantoprazole  40 mg Oral Daily    polyethylene glycol  17 g Oral Daily    tamsulosin  0.4 mg Oral Daily    tiZANidine  2 mg Oral Q8H     PRN Meds:HYDROmorphone, HYDROmorphone, HYDROmorphone, senna-docusate 8.6-50 mg     Review of Systems   Constitutional: Negative for chills and fever.   Eyes: Negative for photophobia and visual disturbance.   Respiratory: Negative for cough and shortness of breath.    Gastrointestinal: Negative for abdominal pain, nausea and vomiting.   Genitourinary: Negative for difficulty urinating and enuresis.   Musculoskeletal: Positive for back pain and gait problem. Negative for neck pain.   Neurological: Negative for dizziness, weakness, light-headedness, numbness and headaches.   Psychiatric/Behavioral: Negative for behavioral problems  and confusion.     Objective:     Weight: 68.1 kg (150 lb 2.1 oz)  Body mass index is 24.98 kg/m².  Vital Signs (Most Recent):  Temp: 98.2 °F (36.8 °C) (01/13/20 0720)  Pulse: 88 (01/13/20 0748)  Resp: 16 (01/13/20 0720)  BP: 122/61 (01/13/20 0720)  SpO2: 95 % (01/13/20 0720) Vital Signs (24h Range):  Temp:  [97.6 °F (36.4 °C)-98.7 °F (37.1 °C)] 98.2 °F (36.8 °C)  Pulse:  [76-93] 88  Resp:  [16-18] 16  SpO2:  [95 %-98 %] 95 %  BP: (122-173)/(61-85) 122/61                          Neurosurgery Physical Exam    General: well developed, well nourished, no distress.   Head: normocephalic, atraumatic  Neck: No tracheal deviation. No palpable masses.   Neurologic: Alert and oriented. Thought content appropriate.  GCS: Motor: 6/Verbal: 5/Eyes: 4 GCS Total: 15  Mental Status: Awake, Alert, Oriented x 4  Language: No aphasia  Speech: No dysarthria  Cranial nerves: face symmetric, tongue midline, CN II-XII grossly intact.   Eyes: pupils equal, round, reactive to light with accomodation, EOMI.   Ears: No drainage.   Pulmonary: normal respirations, no signs of respiratory distress  Abdomen: soft, non-distended, not tender to palpation  Sensory: intact to light touch throughout  Motor Strength: Moves all extremities spontaneously with good tone.  Pain limited weakness in BLE. No abnormal movements seen.      Strength   Deltoids Triceps Biceps Wrist Extension Wrist Flexion Hand    Upper: R 5/5 5/5 5/5 5/5 5/5 5/5     L 5/5 5/5 5/5 5/5 5/5 5/5       Iliopsoas Quadriceps Knee  Flexion Tibialis  anterior Gastro- cnemius EHL   Lower: R 4/5 4/5 4/5 4/5 4/5 4/5     L 4/5 4/5 4/5 4/5 4/5 4/5      Hough: absent  Clonus: absent  Vascular: Pulses 2+ and symmetric radial and dorsalis pedis. No LE edema.   Skin: Skin is warm, dry and intact.      Significant Labs:  Recent Labs   Lab 01/12/20  0356 01/13/20  0506    113*    139   K 4.2 4.0    108   CO2 25 21*   BUN 15 16   CREATININE 1.2 1.2   CALCIUM 10.2 10.3      Recent Labs   Lab 01/12/20  0356 01/13/20  0506   WBC 6.06 7.23   HGB 10.2* 11.4*   HCT 33.8* 36.4*    244     Recent Labs   Lab 01/13/20  0845   INR 1.0   APTT 28.6     Microbiology Results (last 7 days)     ** No results found for the last 168 hours. **        All pertinent labs from the last 24 hours have been reviewed.    Significant Diagnostics:  I have reviewed and interpreted all pertinent imaging results/findings within the past 24 hours.    Assessment/Plan:     Back pain  Pt is a 69yom with pmh RCC s/p nephrectomy 2013 now with known pulmonary metastasis who presented to outside hospital with acutely worsening back pain with spells of tussive lower extremity pain and weakness. MRI revealed a homogenously enhancing lesion in the lumbar spine. Pt transferred for neurosurgical evaluation.    - Neurologically stable on exam  - All labs and diagnostics personally reviewed  - Neuro checks q4h  - MRI lumbar spine w contrast reveals homogenously enhancing intradural mass at L2. L4-5 disc bulge and severe facet arthropathy at this level result in severe canal stenosis and severe right neural foraminal narrowing and moderate left neural foraminal narrowing.   - MRI C/T spine w contrast reviewed. No enhancing intradural or bony metastasis noted within the cervical or thoracic spine, no significant canal compromise or cord signal changes. Intramuscular lesion noted in right paraspinal muscles of cervical region.   - Plan for OR Tuesday 1/14 for L2 laminectomy for intradural tumor resection and laminectomy at L4-5 for decompression. Discussed with patient and family at bedside, all in agreement to plan. All questions answered.   - NPO at midnight.   - Back Pain/Neurogenic Claudication: Acute Pain Service following due to uncontrolled pain, appreciate assistance:   - Continue scheduled Tylenol 1000mg q6h.   - Gabapentin 300mg TID.  Titrate as tolerated / as renal function tolerates.   - Tizanidine 2mg TID.   "Titrate as tolerated / as renal function tolerates.   - Lidocaine patches x2 to back.   - Dilaudid 2mg po q3h PRN moderate pain   - Dilaudid 4mg po q3h PRN severe pain   - Dilaudid 0.5mg IV q2h PRN breakthrough pain  - Metastatic RCC: Pt has a hx of metastatic RCC (diffuse bony mets, pulmonary mets) and new pancreatic mass that has not responded to chemotherapy.    - Last visit w/ oncologist Dr. Anna 3/8/2019, pt elected not to have a biopsy done of the pancreatic mass to determine if it is pancreatic cancer. He elected to stop chemotherapy and end of life counseling/hospice was discussed. Per Dr. Anna's note, "The patient communicated that if he were comatose and had little chance of a meaningful recovery, he would not want machines/life-sustaining treatments used."    - Medical oncology saw patient 1/9, appreciate assistance. Pt does not want biopsy at this time for pancreatic mass and would like to focus on treatment for back pain. Will follow-up with his oncologist at discharge.   - Preoperative Clearance: Documented risk assessment by the [employer] found in HM Consult note dated 1/9/2019.  - Hypercalcemia: 2/2 malignancy. Appreciate HM management. s/p Zometa 4 mg IV once (1/9/20). Ca 10.3 today, WNL, off IVF.  - HTN: Not on any home meds for BP, has been elevated since admission. Pain likely contributory. HM following, appreciate recs. Continue amlodipine 5mg daily.  - Palliative Care Encounter: Palliative care team consulted, appreciate assistance:  - Continue current pain management plan. Will reassess post operatively. Appears amenable to DNR order - primary team to assess and write orders as appropriate. Would benefit from continued information and education regarding clinical condtition. Amenable to home palliative care - pal med will facilitate referral   - Palliative medicine will continue to follow for goals of care and advanced care planning.    - Discussed with pt's daughter on " 1/13, currently out of town and has not yet discussed advanced care planning/code status with pt. Will leave as full code for now and update after surgery once pt ready to fill out paperwork.  -DVT prophylaxis: KENTON's, SCD's  -Bowel regimen: senna and miralax                BECKA CarrilloC  Neurosurgery  [facility]

## 2020-01-13 NOTE — SUBJECTIVE & OBJECTIVE
Interval History: Pt feels well. Brother at bed side this morning. Pain is tolerable. Ca++ 10.3 today. Plan for procedure tomorrow.     Review of Systems  Objective:     Vital Signs (Most Recent):  Temp: 98.4 °F (36.9 °C) (01/13/20 1247)  Pulse: 81 (01/13/20 1247)  Resp: 18 (01/13/20 1247)  BP: (!) 153/72 (01/13/20 1247)  SpO2: 97 % (01/13/20 1247) Vital Signs (24h Range):  Temp:  [97.9 °F (36.6 °C)-98.7 °F (37.1 °C)] 98.4 °F (36.9 °C)  Pulse:  [76-93] 81  Resp:  [16-18] 18  SpO2:  [95 %-98 %] 97 %  BP: (122-173)/(61-85) 153/72     Weight: 68.1 kg (150 lb 2.1 oz)  Body mass index is 24.98 kg/m².    Intake/Output Summary (Last 24 hours) at 1/13/2020 1410  Last data filed at 1/13/2020 0626  Gross per 24 hour   Intake --   Output 1450 ml   Net -1450 ml      Physical Exam   Constitutional: He is oriented to person, place, and time. He appears well-developed and well-nourished. No distress.   HENT:   Head: Normocephalic and atraumatic.   Mouth/Throat: Oropharynx is clear and moist.   Eyes: Pupils are equal, round, and reactive to light.   Neck: Normal range of motion. Neck supple. No JVD present.   Cardiovascular: Normal rate, regular rhythm and normal heart sounds.   No murmur heard.  Pulmonary/Chest: Effort normal and breath sounds normal. No respiratory distress. He has no wheezes. He has no rales.   Abdominal: Soft. Bowel sounds are normal. He exhibits no distension. There is no tenderness.   Musculoskeletal: Normal range of motion. He exhibits no edema or deformity.   Neurological: He is alert and oriented to person, place, and time. No sensory deficit. He exhibits normal muscle tone.   strenght 4/5 in LEs and 5/5 in upper extremities   Skin: Skin is warm.   Vitals reviewed.      Significant Labs:   CBC:   Recent Labs   Lab 01/12/20  0356 01/13/20  0506   WBC 6.06 7.23   HGB 10.2* 11.4*   HCT 33.8* 36.4*    244     CMP:   Recent Labs   Lab 01/12/20  0356 01/13/20  0506    139   K 4.2 4.0    108    CO2 25 21*    113*   BUN 15 16   CREATININE 1.2 1.2   CALCIUM 10.2 10.3   ANIONGAP 5* 10   EGFRNONAA >60.0 >60.0       Significant Imaging: I have reviewed all pertinent imaging results/findings within the past 24 hours.

## 2020-01-13 NOTE — SUBJECTIVE & OBJECTIVE
Interval History:     Past Medical History:   Diagnosis Date    GERD (gastroesophageal reflux disease)     Renal cell carcinoma        Past Surgical History:   Procedure Laterality Date    APPENDECTOMY      HERNIA REPAIR      NEPHRECTOMY         Review of patient's allergies indicates:   Allergen Reactions    Other Nausea And Vomiting     SODIUM PENATHOL  CAUSES SEVERE N & V       Medications:  Continuous Infusions:   [START ON 1/14/2020] sodium chloride 0.9%       Scheduled Meds:   acetaminophen  1,000 mg Oral Q6H    amLODIPine  5 mg Oral Daily    finasteride  5 mg Oral Daily    gabapentin  300 mg Oral TID    lidocaine  2 patch Transdermal Q24H    pantoprazole  40 mg Oral Daily    polyethylene glycol  17 g Oral Daily    tamsulosin  0.4 mg Oral Daily    tiZANidine  2 mg Oral Q8H     PRN Meds:HYDROmorphone, HYDROmorphone, HYDROmorphone, senna-docusate 8.6-50 mg    Family History     None        Tobacco Use    Smoking status: Never Smoker   Substance and Sexual Activity    Alcohol use: No    Drug use: No    Sexual activity: Not on file       Review of Systems   Constitutional: Positive for activity change and fatigue.   Musculoskeletal: Positive for back pain.   Neurological: Positive for weakness and numbness.   Psychiatric/Behavioral: Negative for agitation, behavioral problems and confusion.     Objective:     Vital Signs (Most Recent):  Temp: 98.2 °F (36.8 °C) (01/13/20 0720)  Pulse: 88 (01/13/20 1116)  Resp: 16 (01/13/20 0720)  BP: 122/61 (01/13/20 0720)  SpO2: 95 % (01/13/20 0720) Vital Signs (24h Range):  Temp:  [97.6 °F (36.4 °C)-98.7 °F (37.1 °C)] 98.2 °F (36.8 °C)  Pulse:  [76-93] 88  Resp:  [16-18] 16  SpO2:  [95 %-98 %] 95 %  BP: (122-173)/(61-85) 122/61     Weight: 68.1 kg (150 lb 2.1 oz)  Body mass index is 24.98 kg/m².    Review of Symptoms  Symptom Assessment (ESAS 0-10 scale)   ESAS 0 1 2 3 4 5 6 7 8 9 10   Pain      x        Dyspnea x             Anxiety x             Nausea x      "        Depression  x             Anorexia x             Fatigue    x          Insomnia x             Restlessness  x             Agitation x             CAM / Delirium __ --  ___+   Constipation     __ --  ___+   Diarrhea           __ --  ___+  Bowel Management Plan (BMP): yes    Comments:   Pain Assessment:complains of moderate to severe 5-8/10 intermittent pain to legs and lower back.  Associated with "disc problems" and newly discovered mass.  Pain is impacting mobility.  Relieved with pain medications       OME in 24 hours: 33    Performance Status: 50    ECOG Performance Status Grade: 1 - Ambulates, capable of light work    Physical Exam   Constitutional: He is oriented to person, place, and time. He appears well-developed and well-nourished. No distress.   HENT:   Head: Normocephalic and atraumatic.   Cardiovascular: Normal rate, regular rhythm and normal heart sounds.   Pulmonary/Chest: Effort normal and breath sounds normal. No respiratory distress.   Abdominal: Soft. Bowel sounds are normal.   Musculoskeletal:   Back pain, uses walker    Neurological: He is alert and oriented to person, place, and time.   Skin: Skin is warm and dry.   Psychiatric: He has a normal mood and affect. His behavior is normal. Judgment and thought content normal.   Nursing note and vitals reviewed.      Significant Labs: All pertinent labs within the past 24 hours have been reviewed.  CBC:   Recent Labs   Lab 01/13/20  0506   WBC 7.23   HGB 11.4*   HCT 36.4*   MCV 86        BMP:  Recent Labs   Lab 01/13/20  0506   *      K 4.0      CO2 21*   BUN 16   CREATININE 1.2   CALCIUM 10.3     LFT:  Lab Results   Component Value Date    AST 17 01/07/2020    ALKPHOS 49 (L) 01/07/2020    BILITOT 0.6 01/07/2020     Albumin:   Albumin   Date Value Ref Range Status   01/07/2020 3.4 (L) 3.5 - 5.2 g/dL Final   10/03/2012 4.1 3.6 - 5.1 g/dL Final     Comment:     Albumin:  THIS TEST WAS PERFORMED AT:  [facility] " "[facility]  [address] [place] [address]  NIKKY Strickland MD, PhD     Protein:   Total Protein   Date Value Ref Range Status   01/07/2020 7.7 6.0 - 8.4 g/dL Final     Lactic acid:   No results found for: LACTATE    Significant Imaging: I have reviewed all pertinent imaging results/findings within the past 24 hours.    Advance Care Planning   Advanced Directives::  Living Will: No  LaPOST: No  Do Not Resuscitate Status: No  Medical Power of : No    Decision-Making Capacity: Patient answered questions       Living Arrangements: Lives with family    Psychosocial/Cultural: , 4 adult children,  Worked as a , enjoys going to Religion and cooking   Patient's most important priorities:  Is to have pain controlled.  Goals do not include taking any systemic chemotherapy or immunotherapy.  States previous experience made him very sick.      Patient's biggest concerns/fears: States it is not important to know what this is - new cancer, if it will mean procedures and drugs that will take away his quality of life.  If it is a cancer he has already made decision not to treat.  If this would be  Cause of  his death, he believes this is God's will.  He does not intend to be sick from drugs if it will not keep him from dying.     Patient's goals/hopes:  - to be comfortable and has a much quality of life as possible.  Spend time with his family     piritual:     F- Francine and Belief: Yarsanism     I - Importance: "Deep belief in God's will and uses francine to guide decisions"  .  C - Community: attends Religion services routinely, participates in Religion activities    A - Address in Care: amenable to  visits and his preacher has been in contact with him.  "

## 2020-01-13 NOTE — ANESTHESIA POST-OP PAIN MANAGEMENT
[facility]  Anesthesiology-Acute Pain Service  Consult Note    HPI:  Raymundo Richard is a 69 y.o. male with stage IV RCC (s/p right nephrectomy in 2013) with metastasis to lung and bone who presented as a transfer from [facility] for NSGY evaluation for worsening back pain and lower extremity weakness.  MRI revealed homogenously enhancing lesion of lumbar spine.    Pt reports intermittent sharp shooting pain down bilateral lower extremities, as well as muscle spasms.  He states muscle relaxants have provided little relief.  NSGY started pt on oxycodone IR 10mg q4h PRN moderate pain and Dilaudid 1mg IV q2h PRN severe pain.    Palliative Medicine was consulted and saw the pt on 1/10, however only discussed goals of care, not providing symptom management.    During initial interview with the pt, he is somnolent and appears comfortable.    Surgery:  L2-3 laminectomy for resection of intradural mass and L4-5 laminectomy (scheduled for 1/14/2020)    Problem List:    Active Hospital Problems    Diagnosis  POA    *Preoperative clearance [Z01.818]  Not Applicable    Hypertension [I10]  Unknown    Advanced care planning/counseling discussion [Z71.89]  Not Applicable    Goals of care, counseling/discussion [Z71.89]  Not Applicable    Palliative care encounter [Z51.5]  Not Applicable    Pre-op testing [Z01.818]  Not Applicable    Intradural mass [G95.89]  Yes    Lumbar foraminal stenosis [M48.061]  Yes    Hypercalcemia of malignancy [E83.52]  Unknown    Back pain [M54.9]  Yes    Metastatic renal cell carcinoma [C64.9]  Yes      Resolved Hospital Problems   No resolved problems to display.       Subjective:     General appearance of alert, oriented, no complaints   Pain with rest: 7    Numbers   Pain with movement: 10    Numbers   Side Effects    1. Pruritis No    2. Nausea No    3. Motor Blockade No, 0=Ability to raise lower extremities off bed    4. Sedation Yes, 2=slightly drowsy, easily  aroused    Objective:     Vitals   Vitals:    01/12/20 1730   BP: (!) 147/74   Pulse: 93   Resp: 18   Temp: 36.7 °C (98.1 °F)     Physical Exam   Constitutional: He is oriented to person, place, and time. He appears well-developed and well-nourished. He appears lethargic. No distress.   HENT:   Head: Normocephalic and atraumatic.   Eyes: Pupils are equal, round, and reactive to light. No scleral icterus.   Neck: Normal range of motion.   Cardiovascular: Normal rate.   Pulmonary/Chest: Effort normal. No respiratory distress.   Neurological: He is oriented to person, place, and time. He appears lethargic.   Skin: Skin is warm and dry. He is not diaphoretic.   Psychiatric: He has a normal mood and affect.   Vitals reviewed.       Labs    Admission on 01/08/2020   Component Date Value Ref Range Status    Group & Rh 01/08/2020 B POS   Final    Indirect Zaki 01/08/2020 NEG   Final    WBC 01/09/2020 8.96  3.90 - 12.70 K/uL Final    RBC 01/09/2020 4.33* 4.60 - 6.20 M/uL Final    Hemoglobin 01/09/2020 11.6* 14.0 - 18.0 g/dL Final    Hematocrit 01/09/2020 37.8* 40.0 - 54.0 % Final    Mean Corpuscular Volume 01/09/2020 87  82 - 98 fL Final    Mean Corpuscular Hemoglobin 01/09/2020 26.8* 27.0 - 31.0 pg Final    Mean Corpuscular Hemoglobin Conc 01/09/2020 30.7* 32.0 - 36.0 g/dL Final    RDW 01/09/2020 13.3  11.5 - 14.5 % Final    Platelets 01/09/2020 262  150 - 350 K/uL Final    MPV 01/09/2020 8.9* 9.2 - 12.9 fL Final    Immature Granulocytes 01/09/2020 0.3  0.0 - 0.5 % Final    Gran # (ANC) 01/09/2020 5.8  1.8 - 7.7 K/uL Final    Immature Grans (Abs) 01/09/2020 0.03  0.00 - 0.04 K/uL Final    Lymph # 01/09/2020 1.9  1.0 - 4.8 K/uL Final    Mono # 01/09/2020 1.1* 0.3 - 1.0 K/uL Final    Eos # 01/09/2020 0.1  0.0 - 0.5 K/uL Final    Baso # 01/09/2020 0.04  0.00 - 0.20 K/uL Final    nRBC 01/09/2020 0  0 /100 WBC Final    Gran% 01/09/2020 64.7  38.0 - 73.0 % Final    Lymph% 01/09/2020 20.8  18.0 - 48.0 %  Final    Mono% 01/09/2020 12.2  4.0 - 15.0 % Final    Eosinophil% 01/09/2020 1.6  0.0 - 8.0 % Final    Basophil% 01/09/2020 0.4  0.0 - 1.9 % Final    Differential Method 01/09/2020 Automated   Final    Sodium 01/09/2020 138  136 - 145 mmol/L Final    Potassium 01/09/2020 4.0  3.5 - 5.1 mmol/L Final    Chloride 01/09/2020 106  95 - 110 mmol/L Final    CO2 01/09/2020 24  23 - 29 mmol/L Final    Glucose 01/09/2020 113* 70 - 110 mg/dL Final    BUN, Bld 01/09/2020 28* 8 - 23 mg/dL Final    Creatinine 01/09/2020 1.4  0.5 - 1.4 mg/dL Final    Calcium 01/09/2020 12.0* 8.7 - 10.5 mg/dL Final    Anion Gap 01/09/2020 8  8 - 16 mmol/L Final    eGFR if African American 01/09/2020 58.8* >60 mL/min/1.73 m^2 Final    eGFR if non African American 01/09/2020 50.9* >60 mL/min/1.73 m^2 Final    WBC 01/10/2020 5.95  3.90 - 12.70 K/uL Final    RBC 01/10/2020 4.28* 4.60 - 6.20 M/uL Final    Hemoglobin 01/10/2020 11.3* 14.0 - 18.0 g/dL Final    Hematocrit 01/10/2020 38.5* 40.0 - 54.0 % Final    Mean Corpuscular Volume 01/10/2020 90  82 - 98 fL Final    Mean Corpuscular Hemoglobin 01/10/2020 26.4* 27.0 - 31.0 pg Final    Mean Corpuscular Hemoglobin Conc 01/10/2020 29.4* 32.0 - 36.0 g/dL Final    RDW 01/10/2020 13.6  11.5 - 14.5 % Final    Platelets 01/10/2020 270  150 - 350 K/uL Final    MPV 01/10/2020 8.9* 9.2 - 12.9 fL Final    Immature Granulocytes 01/10/2020 0.3  0.0 - 0.5 % Final    Gran # (ANC) 01/10/2020 3.4  1.8 - 7.7 K/uL Final    Immature Grans (Abs) 01/10/2020 0.02  0.00 - 0.04 K/uL Final    Lymph # 01/10/2020 1.4  1.0 - 4.8 K/uL Final    Mono # 01/10/2020 0.8  0.3 - 1.0 K/uL Final    Eos # 01/10/2020 0.3  0.0 - 0.5 K/uL Final    Baso # 01/10/2020 0.08  0.00 - 0.20 K/uL Final    nRBC 01/10/2020 0  0 /100 WBC Final    Gran% 01/10/2020 57.1  38.0 - 73.0 % Final    Lymph% 01/10/2020 24.0  18.0 - 48.0 % Final    Mono% 01/10/2020 12.6  4.0 - 15.0 % Final    Eosinophil% 01/10/2020 4.7  0.0 - 8.0 %  Final    Basophil% 01/10/2020 1.3  0.0 - 1.9 % Final    Differential Method 01/10/2020 Automated   Final    Sodium 01/10/2020 141  136 - 145 mmol/L Final    Potassium 01/10/2020 4.4  3.5 - 5.1 mmol/L Final    Chloride 01/10/2020 111* 95 - 110 mmol/L Final    CO2 01/10/2020 23  23 - 29 mmol/L Final    Glucose 01/10/2020 120* 70 - 110 mg/dL Final    BUN, Bld 01/10/2020 25* 8 - 23 mg/dL Final    Creatinine 01/10/2020 1.2  0.5 - 1.4 mg/dL Final    Calcium 01/10/2020 12.0* 8.7 - 10.5 mg/dL Final    Anion Gap 01/10/2020 7* 8 - 16 mmol/L Final    eGFR if African American 01/10/2020 >60.0  >60 mL/min/1.73 m^2 Final    eGFR if non African American 01/10/2020 >60.0  >60 mL/min/1.73 m^2 Final    WBC 01/11/2020 4.80  3.90 - 12.70 K/uL Final    RBC 01/11/2020 4.11* 4.60 - 6.20 M/uL Final    Hemoglobin 01/11/2020 11.0* 14.0 - 18.0 g/dL Final    Hematocrit 01/11/2020 36.8* 40.0 - 54.0 % Final    Mean Corpuscular Volume 01/11/2020 90  82 - 98 fL Final    Mean Corpuscular Hemoglobin 01/11/2020 26.8* 27.0 - 31.0 pg Final    Mean Corpuscular Hemoglobin Conc 01/11/2020 29.9* 32.0 - 36.0 g/dL Final    RDW 01/11/2020 13.6  11.5 - 14.5 % Final    Platelets 01/11/2020 240  150 - 350 K/uL Final    MPV 01/11/2020 8.8* 9.2 - 12.9 fL Final    Immature Granulocytes 01/11/2020 0.2  0.0 - 0.5 % Final    Gran # (ANC) 01/11/2020 2.9  1.8 - 7.7 K/uL Final    Immature Grans (Abs) 01/11/2020 0.01  0.00 - 0.04 K/uL Final    Lymph # 01/11/2020 0.8* 1.0 - 4.8 K/uL Final    Mono # 01/11/2020 0.6  0.3 - 1.0 K/uL Final    Eos # 01/11/2020 0.3  0.0 - 0.5 K/uL Final    Baso # 01/11/2020 0.07  0.00 - 0.20 K/uL Final    nRBC 01/11/2020 0  0 /100 WBC Final    Gran% 01/11/2020 60.6  38.0 - 73.0 % Final    Lymph% 01/11/2020 17.5* 18.0 - 48.0 % Final    Mono% 01/11/2020 13.3  4.0 - 15.0 % Final    Eosinophil% 01/11/2020 6.9  0.0 - 8.0 % Final    Basophil% 01/11/2020 1.5  0.0 - 1.9 % Final    Differential Method 01/11/2020  Automated   Final    Sodium 01/11/2020 142  136 - 145 mmol/L Final    Potassium 01/11/2020 4.2  3.5 - 5.1 mmol/L Final    Chloride 01/11/2020 111* 95 - 110 mmol/L Final    CO2 01/11/2020 25  23 - 29 mmol/L Final    Glucose 01/11/2020 101  70 - 110 mg/dL Final    BUN, Bld 01/11/2020 19  8 - 23 mg/dL Final    Creatinine 01/11/2020 1.2  0.5 - 1.4 mg/dL Final    Calcium 01/11/2020 10.4  8.7 - 10.5 mg/dL Final    Anion Gap 01/11/2020 6* 8 - 16 mmol/L Final    eGFR if African American 01/11/2020 >60.0  >60 mL/min/1.73 m^2 Final    eGFR if non African American 01/11/2020 >60.0  >60 mL/min/1.73 m^2 Final    WBC 01/12/2020 6.06  3.90 - 12.70 K/uL Final    RBC 01/12/2020 3.79* 4.60 - 6.20 M/uL Final    Hemoglobin 01/12/2020 10.2* 14.0 - 18.0 g/dL Final    Hematocrit 01/12/2020 33.8* 40.0 - 54.0 % Final    Mean Corpuscular Volume 01/12/2020 89  82 - 98 fL Final    Mean Corpuscular Hemoglobin 01/12/2020 26.9* 27.0 - 31.0 pg Final    Mean Corpuscular Hemoglobin Conc 01/12/2020 30.2* 32.0 - 36.0 g/dL Final    RDW 01/12/2020 13.3  11.5 - 14.5 % Final    Platelets 01/12/2020 224  150 - 350 K/uL Final    MPV 01/12/2020 8.6* 9.2 - 12.9 fL Final    Immature Granulocytes 01/12/2020 0.3  0.0 - 0.5 % Final    Gran # (ANC) 01/12/2020 3.3  1.8 - 7.7 K/uL Final    Immature Grans (Abs) 01/12/2020 0.02  0.00 - 0.04 K/uL Final    Lymph # 01/12/2020 1.3  1.0 - 4.8 K/uL Final    Mono # 01/12/2020 0.9  0.3 - 1.0 K/uL Final    Eos # 01/12/2020 0.4  0.0 - 0.5 K/uL Final    Baso # 01/12/2020 0.08  0.00 - 0.20 K/uL Final    nRBC 01/12/2020 0  0 /100 WBC Final    Gran% 01/12/2020 55.1  38.0 - 73.0 % Final    Lymph% 01/12/2020 22.1  18.0 - 48.0 % Final    Mono% 01/12/2020 14.9  4.0 - 15.0 % Final    Eosinophil% 01/12/2020 6.3  0.0 - 8.0 % Final    Basophil% 01/12/2020 1.3  0.0 - 1.9 % Final    Differential Method 01/12/2020 Automated   Final    Sodium 01/12/2020 139  136 - 145 mmol/L Final    Potassium 01/12/2020  4.2  3.5 - 5.1 mmol/L Final    Chloride 01/12/2020 109  95 - 110 mmol/L Final    CO2 01/12/2020 25  23 - 29 mmol/L Final    Glucose 01/12/2020 107  70 - 110 mg/dL Final    BUN, Bld 01/12/2020 15  8 - 23 mg/dL Final    Creatinine 01/12/2020 1.2  0.5 - 1.4 mg/dL Final    Calcium 01/12/2020 10.2  8.7 - 10.5 mg/dL Final    Anion Gap 01/12/2020 5* 8 - 16 mmol/L Final    eGFR if African American 01/12/2020 >60.0  >60 mL/min/1.73 m^2 Final    eGFR if non African American 01/12/2020 >60.0  >60 mL/min/1.73 m^2 Final        Meds   Current Facility-Administered Medications   Medication Dose Route Frequency Provider Last Rate Last Dose    0.9%  NaCl infusion   Intravenous Continuous Emmanuel Moreno MD 75 mL/hr at 01/12/20 0812      acetaminophen tablet 1,000 mg  1,000 mg Oral Q6H Riley Monk MD   1,000 mg at 01/12/20 1727    amLODIPine tablet 5 mg  5 mg Oral Daily Emmanuel Moreno MD   5 mg at 01/12/20 0903    diazePAM tablet 5 mg  5 mg Oral Once Kennedi Bose MD        finasteride tablet 5 mg  5 mg Oral Daily Riley Hatfield MD   5 mg at 01/12/20 0903    gabapentin capsule 300 mg  300 mg Oral TID Kennedi Bose MD   300 mg at 01/12/20 1456    HYDROmorphone injection 1 mg  1 mg Intravenous Q2H PRN Riley Monk MD   1 mg at 01/12/20 1726    HYDROmorphone tablet 2 mg  2 mg Oral Q3H PRN Riley Monk MD        HYDROmorphone tablet 4 mg  4 mg Oral Q3H PRN Riley Monk MD        lidocaine 5 % patch 2 patch  2 patch Transdermal Q24H Riley Monk MD   2 patch at 01/12/20 0915    pantoprazole EC tablet 40 mg  40 mg Oral Daily Riley Hatfield MD   40 mg at 01/12/20 0902    polyethylene glycol packet 17 g  17 g Oral Daily Emmanuel Moreno MD   17 g at 01/12/20 0902    senna-docusate 8.6-50 mg per tablet 2 tablet  2 tablet Oral Nightly PRN Riley Hatfield MD        tamsulosin 24 hr capsule 0.4 mg  0.4 mg Oral Daily Riley Hatfield MD   0.4 mg at 01/12/20 0902     tiZANidine tablet 2 mg  2 mg Oral Q8H Riley Monk MD   2 mg at 01/12/20 9883       Assessment:    Pt is a 68 yo male with metastatic RCC with mets to lung and bone, resulting in low back pain and peripheral neuropathy. Pt scheduled to go to the OR for laminectomy on Tuesday.  Pain control inadequate.     Plan:    - Modify present therapy to improve control of pain.  - Continue scheduled Tylenol 1000mg q6h.  - Increase gabapentin to 300mg TID.  Titrate as tolerated / as renal function tolerates.  - Start tizanidine 2mg TID.  Titrate as tolerated / as renal function tolerates.  - Start lidocaine patches x2 to back.  - Discontinue oxycodone.  - Start Dilaudid 2mg po q3h PRN moderate pain  - Start Dilaudid 4mg po q3h PRN severe pain  - Start Dilaudid 0.5mg IV q2h PRN breakthrough pain  - Continue bowel regimen  - Agree with continued Palliative Medicine input  - We will follow up with the patient.    Riley Monk MD  PGY-2 / CA-1

## 2020-01-13 NOTE — PROGRESS NOTES
[facility]  Neurosurgery  Progress Note    Subjective:     History of Present Illness: Pt is a 69yom with pmh RCC s/p nephrectomy 2013 now with known pulmonary metastasis who presented to outside hospital with acutely worsening back pain with spells of tussive lower extremity pain and weakness. MRI revealed a homogenously enhancing lesion in the lumbar spine. Pt transferred for neurosurgical evaluation.    Post-Op Info:  Procedure(s) (LRB):  L2-3 laminectomy for resection of intradural mass and L4-5 laminectomy (N/A)         Interval History: NAEON Pending pain management recs. OR Tuesday for resection.     Medications:  Continuous Infusions:   [START ON 1/14/2020] sodium chloride 0.9%       Scheduled Meds:   acetaminophen  1,000 mg Oral Q6H    amLODIPine  5 mg Oral Daily    finasteride  5 mg Oral Daily    gabapentin  300 mg Oral TID    lidocaine  2 patch Transdermal Q24H    pantoprazole  40 mg Oral Daily    polyethylene glycol  17 g Oral Daily    tamsulosin  0.4 mg Oral Daily    tiZANidine  2 mg Oral Q8H     PRN Meds:HYDROmorphone, HYDROmorphone, HYDROmorphone, senna-docusate 8.6-50 mg     Review of Systems  Objective:     Weight: 68.1 kg (150 lb 2.1 oz)  Body mass index is 24.98 kg/m².  Vital Signs (Most Recent):  Temp: 98.4 °F (36.9 °C) (01/13/20 1247)  Pulse: 81 (01/13/20 1247)  Resp: 18 (01/13/20 1247)  BP: (!) 153/72 (01/13/20 1247)  SpO2: 97 % (01/13/20 1247) Vital Signs (24h Range):  Temp:  [97.9 °F (36.6 °C)-98.7 °F (37.1 °C)] 98.4 °F (36.9 °C)  Pulse:  [76-93] 81  Resp:  [16-18] 18  SpO2:  [95 %-98 %] 97 %  BP: (122-173)/(61-85) 153/72                          Neurosurgery Physical Exam   General: well developed, well nourished, no distress.   Head: normocephalic, atraumatic  Neck: No tracheal deviation. No palpable masses.   Neurologic: Alert and oriented. Thought content appropriate.  GCS: Motor: 6/Verbal: 5/Eyes: 4 GCS Total: 15  Mental Status: Awake, Alert, Oriented x  4  Language: No aphasia  Speech: No dysarthria  Cranial nerves: face symmetric, tongue midline, CN II-XII grossly intact.   Eyes: pupils equal, round, reactive to light with accomodation, EOMI.   Ears: No drainage.   Pulmonary: normal respirations, no signs of respiratory distress  Abdomen: soft, non-distended, not tender to palpation  Sensory: intact to light touch throughout  Motor Strength: Moves all extremities spontaneously with good tone.  Pain limited weakness in BLE. No abnormal movements seen.      Strength   Deltoids Triceps Biceps Wrist Extension Wrist Flexion Hand    Upper: R 5/5 5/5 5/5 5/5 5/5 5/5     L 5/5 5/5 5/5 5/5 5/5 5/5       Iliopsoas Quadriceps Knee  Flexion Tibialis  anterior Gastro- cnemius EHL   Lower: R 4/5 4/5 4/5 4/5 4/5 4/5     L 4/5 4/5 4/5 4/5 4/5 4/5      Hough: absent  Clonus: absent  Vascular: Pulses 2+ and symmetric radial and dorsalis pedis. No LE edema.   Skin: Skin is warm, dry and intact.      Significant Labs:  Recent Labs   Lab 01/12/20  0356 01/13/20  0506    113*    139   K 4.2 4.0    108   CO2 25 21*   BUN 15 16   CREATININE 1.2 1.2   CALCIUM 10.2 10.3     Recent Labs   Lab 01/12/20  0356 01/13/20  0506   WBC 6.06 7.23   HGB 10.2* 11.4*   HCT 33.8* 36.4*    244     Recent Labs   Lab 01/13/20  0845   INR 1.0   APTT 28.6     Microbiology Results (last 7 days)     ** No results found for the last 168 hours. **            Assessment/Plan:     Back pain  Pt is a 69yom with pmh RCC s/p nephrectomy 2013 now with known pulmonary metastasis who presented to outside hospital with acutely worsening back pain with spells of tussive lower extremity pain and weakness. MRI revealed a homogenously enhancing lesion in the lumbar spine. Pt transferred for neurosurgical evaluation.    - Neurologically stable on exam  - All labs and diagnostics personally reviewed  - Neuro checks q4h  - MRI lumbar spine w contrast reveals homogenously enhancing intradural mass  "at L2. L4-5 disc bulge and severe facet arthropathy at this level result in severe canal stenosis and severe right neural foraminal narrowing and moderate left neural foraminal narrowing.   - MRI C/T spine w contrast reviewed. No enhancing intradural or bony metastasis noted within the cervical or thoracic spine, no significant canal compromise or cord signal changes. Intramuscular lesion noted in right paraspinal muscles of cervical region.   - Plan for OR Tuesday 1/14 for L2 laminectomy for intradural tumor resection and laminectomy at L4-5 for decompression. Discussed with patient and family at bedside, all in agreement to plan. All questions answered.   - NPO at midnight.   - Metastatic RCC: Pt has a hx of metastatic RCC (diffuse bony mets, pulmonary mets) and new pancreatic mass that has not responded to chemotherapy.    - Last visit w/ oncologist Dr. Anna 3/8/2019, pt elected not to have a biopsy done of the pancreatic mass to determine if it is pancreatic cancer. He elected to stop chemotherapy and end of life counseling/hospice was discussed. Per Dr. Anna's note, "The patient communicated that if he were comatose and had little chance of a meaningful recovery, he would not want machines/life-sustaining treatments used."    - Medical oncology saw patient 1/9, appreciate assistance. Pt does not want biopsy at this time for pancreatic mass and would like to focus on treatment for back pain. Will follow-up with his oncologist at discharge.   - Preoperative Clearance: Documented risk assessment by the [employer] found in HM Consult note dated 1/9/2019.  - Hypercalcemia: 2/2 malignancy. Appreciate  management. s/p Zometa 4 mg IV once (1/9/20). Ca 10.3 today, WNL, off IVF.  - HTN: Not on any home meds for BP, has been elevated since admission. Pain likely contributory. HM following, appreciate recs. Continue amlodipine 5mg daily.  - Palliative Care Encounter: Palliative care team consulted, " appreciate assistance:  - Continue current pain management plan. Will reassess post operatively. Appears amenable to DNR order - primary team to assess and write orders as appropriate. Would benefit from continued information and education regarding clinical condtition. Amenable to home palliative care - pal med will facilitate referral   - Palliative medicine will continue to follow for goals of care and advanced care planning.    -DVT prophylaxis: KENTON's, SCD's  -Bowel regimen: senna and miralax    Booked for OR on Tues. Will obtain consent.                   Kennedi Weaver MD  Neurosurgery  [facility]

## 2020-01-13 NOTE — ASSESSMENT & PLAN NOTE
"Pt is a 69yom with pmh RCC s/p nephrectomy 2013 now with known pulmonary metastasis who presented to outside hospital with acutely worsening back pain with spells of tussive lower extremity pain and weakness. MRI revealed a homogenously enhancing lesion in the lumbar spine. Pt transferred for neurosurgical evaluation.    - Neurologically stable on exam  - All labs and diagnostics personally reviewed  - Neuro checks q4h  - MRI lumbar spine w contrast reveals homogenously enhancing intradural mass at L2. L4-5 disc bulge and severe facet arthropathy at this level result in severe canal stenosis and severe right neural foraminal narrowing and moderate left neural foraminal narrowing.   - MRI C/T spine w contrast reviewed. No enhancing intradural or bony metastasis noted within the cervical or thoracic spine, no significant canal compromise or cord signal changes. Intramuscular lesion noted in right paraspinal muscles of cervical region.   - Plan for OR Tuesday 1/14 for L2 laminectomy for intradural tumor resection and laminectomy at L4-5 for decompression. Discussed with patient and family at bedside, all in agreement to plan. All questions answered.   - NPO at midnight.   - Metastatic RCC: Pt has a hx of metastatic RCC (diffuse bony mets, pulmonary mets) and new pancreatic mass that has not responded to chemotherapy.    - Last visit w/ oncologist Dr. Anna 3/8/2019, pt elected not to have a biopsy done of the pancreatic mass to determine if it is pancreatic cancer. He elected to stop chemotherapy and end of life counseling/hospice was discussed. Per Dr. Anna's note, "The patient communicated that if he were comatose and had little chance of a meaningful recovery, he would not want machines/life-sustaining treatments used."    - Medical oncology saw patient 1/9, appreciate assistance. Pt does not want biopsy at this time for pancreatic mass and would like to focus on treatment for back pain. Will follow-up with his " oncologist at discharge.   - Preoperative Clearance: Documented risk assessment by the [employer] found in HM Consult note dated 1/9/2019.  - Hypercalcemia: 2/2 malignancy. Appreciate HM management. s/p Zometa 4 mg IV once (1/9/20). Ca 10.3 today, WNL, off IVF.  - HTN: Not on any home meds for BP, has been elevated since admission. Pain likely contributory. HM following, appreciate recs. Continue amlodipine 5mg daily.  - Palliative Care Encounter: Palliative care team consulted, appreciate assistance:  - Continue current pain management plan. Will reassess post operatively. Appears amenable to DNR order - primary team to assess and write orders as appropriate. Would benefit from continued information and education regarding clinical condtition. Amenable to home palliative care - pal med will facilitate referral   - Palliative medicine will continue to follow for goals of care and advanced care planning.    -DVT prophylaxis: KENTON's, SCD's  -Bowel regimen: senna and miralax    Booked for OR on Tues. Will obtain consent.

## 2020-01-14 NOTE — PROGRESS NOTES
[facility]  Neurosurgery  Progress Note    Subjective:     History of Present Illness: Pt is a 69yom with pmh RCC s/p nephrectomy 2013 now with known pulmonary metastasis who presented to outside hospital with acutely worsening back pain with spells of tussive lower extremity pain and weakness. MRI revealed a homogenously enhancing lesion in the lumbar spine. Pt transferred for neurosurgical evaluation.    Post-Op Info:  Procedure(s) (LRB):  L2-3 laminectomy for resection of intradural mass and L4-5 laminectomy; Microscope and micro instruments and neuromonitoring.  No instrumentation hardware (N/A)   Day of Surgery     Interval History: NAEON. Vitals stable. NPO for surgery today, coags within normal range. Calcium trending down. Pain regimen managed by anesthesia currently controlling pain. Denies weakness, paresthesias, b/b dysfunction.     Medications:  Continuous Infusions:   sodium chloride 0.9% 100 mL/hr at 01/14/20 0013     Scheduled Meds:   acetaminophen  1,000 mg Oral Q6H    amLODIPine  5 mg Oral Daily    finasteride  5 mg Oral Daily    gabapentin  300 mg Oral TID    lidocaine  2 patch Transdermal Q24H    pantoprazole  40 mg Oral Daily    polyethylene glycol  17 g Oral Daily    tamsulosin  0.4 mg Oral Daily    tiZANidine  2 mg Oral Q8H     PRN Meds:HYDROmorphone, HYDROmorphone, HYDROmorphone, senna-docusate 8.6-50 mg       Objective:     Weight: 68.1 kg (150 lb 2.1 oz)  Body mass index is 24.98 kg/m².  Vital Signs (Most Recent):  Temp: 98 °F (36.7 °C) (01/14/20 1127)  Pulse: 81 (01/14/20 1127)  Resp: 18 (01/14/20 1127)  BP: (!) 174/76 (01/14/20 1127)  SpO2: 98 % (01/14/20 1127) Vital Signs (24h Range):  Temp:  [98 °F (36.7 °C)-98.7 °F (37.1 °C)] 98 °F (36.7 °C)  Pulse:  [77-97] 81  Resp:  [18-20] 18  SpO2:  [93 %-98 %] 98 %  BP: (118-174)/(61-79) 174/76     Date 01/14/20 0700 - 01/15/20 0659   Shift 7034-9236 7855-8663 7528-2954 24 Hour Total   INTAKE   Shift Total(mL/kg)        OUTPUT   Urine(mL/kg/hr) 350   350   Shift Total(mL/kg) 350(5.1)   350(5.1)   Weight (kg) 68.1 68.1 68.1 68.1                 Neurosurgery Physical Exam    General: well developed, well nourished, no distress.   Head: normocephalic, atraumatic  Neck: No tracheal deviation. No palpable masses.   Neurologic: Alert and oriented. Thought content appropriate.  GCS: Motor: 6/Verbal: 5/Eyes: 4 GCS Total: 15  Mental Status: Awake, Alert, Oriented x 4  Language: No aphasia  Speech: No dysarthria  Cranial nerves: face symmetric, tongue midline, CN II-XII grossly intact.   Eyes: pupils equal, round, reactive to light with accomodation, EOMI.    Ears: No drainage.   Pulmonary: normal respirations, no signs of respiratory distress  Abdomen: soft, non-distended, not tender to palpation  Sensory: intact to light touch throughout  Motor Strength: Moves all extremities spontaneously with good tone.  Pain limited weakness to BLE, left worse than right, during strength assessment. Otherwise full strength upper and lower extremities. No abnormal movements seen.     Strength  Deltoids Triceps Biceps Wrist Extension Wrist Flexion Hand    Upper: R 5/5 5/5 5/5 5/5 5/5 5/5    L 5/5 5/5 5/5 5/5 5/5 5/5     Iliopsoas Quadriceps Knee  Flexion Tibialis  anterior Gastro- cnemius EHL   Lower: R 4+/5 4+/5 4+/5 5/5 5/5 5/5    L 4-/5 4-/5 4-/5 5/5 5/5 5/5     Hough: absent  Clonus: absent  Vascular: Pulses 2+ and symmetric radial and dorsalis pedis. No LE edema.   Skin: Skin is warm, dry and intact.      Significant Labs:  Recent Labs   Lab 01/13/20  0506 01/14/20  0539   * 98    139   K 4.0 4.2    109   CO2 21* 22*   BUN 16 19   CREATININE 1.2 1.4   CALCIUM 10.3 9.9     Recent Labs   Lab 01/13/20  0506 01/14/20  0539   WBC 7.23 7.54   HGB 11.4* 11.2*   HCT 36.4* 36.5*    223     Recent Labs   Lab 01/13/20  0845   INR 1.0   APTT 28.6     Microbiology Results (last 7 days)     ** No results found for the last 168  hours. **        Recent Lab Results       01/14/20  0539        Anion Gap 8     Baso # 0.07     Basophil% 0.9     BUN, Bld 19     Calcium 9.9     Chloride 109     CO2 22     Creatinine 1.4     Differential Method Automated     eGFR if African American 58.8     eGFR if non African American 50.9  Comment:  Calculation used to obtain the estimated glomerular filtration  rate (eGFR) is the CKD-EPI equation.        Eos # 0.3     Eosinophil% 4.0     Glucose 98     Gran # (ANC) 4.8     Gran% 63.7     Hematocrit 36.5     Hemoglobin 11.2     Immature Grans (Abs) 0.04  Comment:  Mild elevation in immature granulocytes is non specific and   can be seen in a variety of conditions including stress response,   acute inflammation, trauma and pregnancy. Correlation with other   laboratory and clinical findings is essential.       Immature Granulocytes 0.5     Lymph # 1.3     Lymph% 17.2     MCH 26.7     MCHC 30.7     MCV 87     Mono # 1.0     Mono% 13.7     MPV 8.7     nRBC 0     Platelets 223     Potassium 4.2     RBC 4.19     RDW 13.5     Sodium 139     WBC 7.54         All pertinent labs from the last 24 hours have been reviewed.    Significant Diagnostics:  I have reviewed all pertinent imaging results/findings within the past 24 hours.    Assessment/Plan:     Back pain  Pt is a 69yom with pmh RCC s/p nephrectomy 2013 now with known pulmonary metastasis who presented to outside hospital with acutely worsening back pain with spells of tussive lower extremity pain and weakness. MRI revealed a homogenously enhancing lesion in the lumbar spine. Pt transferred for neurosurgical evaluation.    - Neurologically stable on exam  - All labs and diagnostics personally reviewed  - Neuro checks q4h  - MRI lumbar spine w contrast reveals homogenously enhancing intradural mass at L2. L4-5 disc bulge and severe facet arthropathy at this level result in severe canal stenosis and severe right neural foraminal narrowing and moderate left neural  "foraminal narrowing.   - MRI C/T spine w contrast reviewed. No enhancing intradural or bony metastasis noted within the cervical or thoracic spine, no significant canal compromise or cord signal changes. Intramuscular lesion noted in right paraspinal muscles of cervical region.   - Plan for OR today, 1/14, for L2 laminectomy for intradural tumor resection and laminectomy at L4-5 for decompression. NPO. Coags within normal range. Discussed with patient and family at bedside 1/10, all in agreement to plan. All questions answered.   - Metastatic RCC: Pt has a hx of metastatic RCC (diffuse bony mets, pulmonary mets) and new pancreatic mass that has not responded to chemotherapy.    - Last visit w/ oncologist Dr. Anna 3/8/2019, pt elected not to have a biopsy done of the pancreatic mass to determine if it is pancreatic cancer. He elected to stop chemotherapy and end of life counseling/hospice was discussed. Per Dr. Anna's note, "The patient communicated that if he were comatose and had little chance of a meaningful recovery, he would not want machines/life-sustaining treatments used."    - Medical oncology saw patient 1/9, appreciate assistance. Pt does not want biopsy at this time for pancreatic mass and would like to focus on treatment for back pain. Will follow-up with his oncologist at discharge.   - Preoperative Clearance: Documented risk assessment by the [employer] found in HM Consult note dated 1/9/2019.  - Hypercalcemia: 2/2 malignancy. Appreciate HM management. s/p Zometa 4 mg IV once (1/9/20). Ca 9.9 today, WNL, off IVF.  - HTN: Not on any home meds for BP, has been elevated since admission. Pain likely contributory. HM following, appreciate recs. Continue amlodipine 5mg daily.  - Palliative Care Encounter: Palliative care team consulted, appreciate assistance:  - Continue current pain management plan. Will reassess post operatively. Appears amenable to DNR order - primary team to assess and " write orders as appropriate. Would benefit from continued information and education regarding clinical condtition. Amenable to home palliative care - pal med will facilitate referral   - Palliative medicine will continue to follow for goals of care and advanced care planning.    -DVT prophylaxis: KENTON's, SCD's  -Bowel regimen: senna and miralax                    Tonie Chopra PA-C  Neurosurgery  [facility]

## 2020-01-14 NOTE — ASSESSMENT & PLAN NOTE
"Pt is a 69yom with [facility] RCC s/p nephrectomy 2013 now with known pulmonary metastasis who presented to outside hospital with acutely worsening back pain with spells of tussive lower extremity pain and weakness. MRI revealed a homogenously enhancing lesion in the lumbar spine. Pt transferred for neurosurgical evaluation.    - Neurologically stable on exam  - All labs and diagnostics personally reviewed  - Neuro checks q4h  - MRI lumbar spine w contrast reveals homogenously enhancing intradural mass at L2. L4-5 disc bulge and severe facet arthropathy at this level result in severe canal stenosis and severe right neural foraminal narrowing and moderate left neural foraminal narrowing.   - MRI C/T spine w contrast reviewed. No enhancing intradural or bony metastasis noted within the cervical or thoracic spine, no significant canal compromise or cord signal changes. Intramuscular lesion noted in right paraspinal muscles of cervical region.   - Plan for OR today, 1/14, for L2 laminectomy for intradural tumor resection and laminectomy at L4-5 for decompression. NPO. Coags within normal range. Discussed with patient and family at bedside 1/10, all in agreement to plan. All questions answered.   - Metastatic RCC: Pt has a hx of metastatic RCC (diffuse bony mets, pulmonary mets) and new pancreatic mass that has not responded to chemotherapy.    - Last visit w/ oncologist Dr. Anna 3/8/2019, pt elected not to have a biopsy done of the pancreatic mass to determine if it is pancreatic cancer. He elected to stop chemotherapy and end of life counseling/hospice was discussed. Per Dr. Anna's note, "The patient communicated that if he were comatose and had little chance of a meaningful recovery, he would not want machines/life-sustaining treatments used."    - Medical oncology saw patient 1/9, appreciate assistance. Pt does not want biopsy at this time for pancreatic mass and would like to focus on treatment for back pain. Will " follow-up with his oncologist at discharge.   - Preoperative Clearance: Documented risk assessment by the [employer] found in HM Consult note dated 1/9/2019.  - Hypercalcemia: 2/2 malignancy. Appreciate HM management. s/p Zometa 4 mg IV once (1/9/20). Ca 9.9 today, WNL, off IVF.  - HTN: Not on any home meds for BP, has been elevated since admission. Pain likely contributory. HM following, appreciate recs. Continue amlodipine 5mg daily.  - Palliative Care Encounter: Palliative care team consulted, appreciate assistance:  - Continue current pain management plan. Will reassess post operatively. Appears amenable to DNR order - primary team to assess and write orders as appropriate. Would benefit from continued information and education regarding clinical condtition. Amenable to home palliative care - pal med will facilitate referral   - Palliative medicine will continue to follow for goals of care and advanced care planning.    -DVT prophylaxis: KENTON's, SCD's  -Bowel regimen: senna and miralax

## 2020-01-14 NOTE — SUBJECTIVE & OBJECTIVE
Interval History: NAEON. Vitals stable. NPO for surgery today, coags within normal range. Calcium trending down. Pain regimen managed by anesthesia currently controlling pain. Denies weakness, paresthesias, b/b dysfunction.     Medications:  Continuous Infusions:   sodium chloride 0.9% 100 mL/hr at 01/14/20 0013     Scheduled Meds:   acetaminophen  1,000 mg Oral Q6H    amLODIPine  5 mg Oral Daily    finasteride  5 mg Oral Daily    gabapentin  300 mg Oral TID    lidocaine  2 patch Transdermal Q24H    pantoprazole  40 mg Oral Daily    polyethylene glycol  17 g Oral Daily    tamsulosin  0.4 mg Oral Daily    tiZANidine  2 mg Oral Q8H     PRN Meds:HYDROmorphone, HYDROmorphone, HYDROmorphone, senna-docusate 8.6-50 mg       Objective:     Weight: 68.1 kg (150 lb 2.1 oz)  Body mass index is 24.98 kg/m².  Vital Signs (Most Recent):  Temp: 98 °F (36.7 °C) (01/14/20 1127)  Pulse: 81 (01/14/20 1127)  Resp: 18 (01/14/20 1127)  BP: (!) 174/76 (01/14/20 1127)  SpO2: 98 % (01/14/20 1127) Vital Signs (24h Range):  Temp:  [98 °F (36.7 °C)-98.7 °F (37.1 °C)] 98 °F (36.7 °C)  Pulse:  [77-97] 81  Resp:  [18-20] 18  SpO2:  [93 %-98 %] 98 %  BP: (118-174)/(61-79) 174/76     Date 01/14/20 0700 - 01/15/20 0659   Shift 7002-0169 9691-7822 0375-8510 24 Hour Total   INTAKE   Shift Total(mL/kg)       OUTPUT   Urine(mL/kg/hr) 350   350   Shift Total(mL/kg) 350(5.1)   350(5.1)   Weight (kg) 68.1 68.1 68.1 68.1                 Neurosurgery Physical Exam    General: well developed, well nourished, no distress.   Head: normocephalic, atraumatic  Neck: No tracheal deviation. No palpable masses.   Neurologic: Alert and oriented. Thought content appropriate.  GCS: Motor: 6/Verbal: 5/Eyes: 4 GCS Total: 15  Mental Status: Awake, Alert, Oriented x 4  Language: No aphasia  Speech: No dysarthria  Cranial nerves: face symmetric, tongue midline, CN II-XII grossly intact.   Eyes: pupils equal, round, reactive to light with accomodation, EOMI.     Ears: No drainage.   Pulmonary: normal respirations, no signs of respiratory distress  Abdomen: soft, non-distended, not tender to palpation  Sensory: intact to light touch throughout  Motor Strength: Moves all extremities spontaneously with good tone.  Pain limited weakness to BLE, left worse than right, during strength assessment. Otherwise full strength upper and lower extremities. No abnormal movements seen.     Strength  Deltoids Triceps Biceps Wrist Extension Wrist Flexion Hand    Upper: R 5/5 5/5 5/5 5/5 5/5 5/5    L 5/5 5/5 5/5 5/5 5/5 5/5     Iliopsoas Quadriceps Knee  Flexion Tibialis  anterior Gastro- cnemius EHL   Lower: R 4+/5 4+/5 4+/5 5/5 5/5 5/5    L 4-/5 4-/5 4-/5 5/5 5/5 5/5     Hough: absent  Clonus: absent  Vascular: Pulses 2+ and symmetric radial and dorsalis pedis. No LE edema.   Skin: Skin is warm, dry and intact.      Significant Labs:  Recent Labs   Lab 01/13/20  0506 01/14/20  0539   * 98    139   K 4.0 4.2    109   CO2 21* 22*   BUN 16 19   CREATININE 1.2 1.4   CALCIUM 10.3 9.9     Recent Labs   Lab 01/13/20  0506 01/14/20  0539   WBC 7.23 7.54   HGB 11.4* 11.2*   HCT 36.4* 36.5*    223     Recent Labs   Lab 01/13/20  0845   INR 1.0   APTT 28.6     Microbiology Results (last 7 days)     ** No results found for the last 168 hours. **        Recent Lab Results       01/14/20  0539        Anion Gap 8     Baso # 0.07     Basophil% 0.9     BUN, Bld 19     Calcium 9.9     Chloride 109     CO2 22     Creatinine 1.4     Differential Method Automated     eGFR if African American 58.8     eGFR if non African American 50.9  Comment:  Calculation used to obtain the estimated glomerular filtration  rate (eGFR) is the CKD-EPI equation.        Eos # 0.3     Eosinophil% 4.0     Glucose 98     Gran # (ANC) 4.8     Gran% 63.7     Hematocrit 36.5     Hemoglobin 11.2     Immature Grans (Abs) 0.04  Comment:  Mild elevation in immature granulocytes is non specific and   can be  seen in a variety of conditions including stress response,   acute inflammation, trauma and pregnancy. Correlation with other   laboratory and clinical findings is essential.       Immature Granulocytes 0.5     Lymph # 1.3     Lymph% 17.2     MCH 26.7     MCHC 30.7     MCV 87     Mono # 1.0     Mono% 13.7     MPV 8.7     nRBC 0     Platelets 223     Potassium 4.2     RBC 4.19     RDW 13.5     Sodium 139     WBC 7.54         All pertinent labs from the last 24 hours have been reviewed.    Significant Diagnostics:  I have reviewed all pertinent imaging results/findings within the past 24 hours.

## 2020-01-14 NOTE — PLAN OF CARE
POC reviewed with patient this shift.  A/O x4.  Respirations unlabored.  Skin w/d.  Continent of b/b.  Ambulates with standby assist to bathroom.  Pt NPO since MN.  Pt continues to have chronic back pain and reports is only minimally effective with current pain regimen.  Excited about pending Sx this AM.  VSS.  Able to verbalize wants/needs.  No s/s of distress noted.

## 2020-01-14 NOTE — ANESTHESIA POST-OP PAIN MANAGEMENT
Acute Pain Service Progress Note    Raymundo Richard is a 69 y.o. male with stage IV RCC (s/p right nephrectomy in 2013) with metastasis to lung and bone admitted to the hospital due to lumbar spinal mass.   - Pt is undergoing L2-3 laminectomy for resection of intradural mass and L4-5 laminectomy   - Undergoing procedure today      Problem List:    Active Hospital Problems    Diagnosis  POA    *Preoperative clearance [Z01.818]  Not Applicable    Hypertension [I10]  Unknown    Advanced care planning/counseling discussion [Z71.89]  Not Applicable    Goals of care, counseling/discussion [Z71.89]  Not Applicable    Palliative care encounter [Z51.5]  Not Applicable    Pre-op testing [Z01.818]  Not Applicable    Intradural mass [G95.89]  Yes    Lumbar foraminal stenosis [M48.061]  Yes    Hypercalcemia of malignancy [E83.52]  Unknown    Back pain [M54.9]  Yes    Metastatic renal cell carcinoma [C64.9]  Yes      Resolved Hospital Problems   No resolved problems to display.       Subjective:     General appearance of alert, oriented, no complaints   Pain with rest: 3    Numbers   Pain with movement: 5    Numbers   Side Effects    1. Pruritis No    2. Nausea No    4. Sedation No, 1=awake and alert    Objective:      Vitals   Vitals:    01/14/20 1127   BP: (!) 174/76   Pulse: 81   Resp: 18   Temp: 36.7 °C (98 °F)        Labs    No results displayed because visit has over 200 results.           Meds   Current Facility-Administered Medications   Medication Dose Route Frequency Provider Last Rate Last Dose    0.9%  NaCl infusion   Intravenous Continuous Rissa Carias PA-C 100 mL/hr at 01/14/20 0013      acetaminophen tablet 1,000 mg  1,000 mg Oral Q6H Riley Monk MD   1,000 mg at 01/14/20 0606    amLODIPine tablet 5 mg  5 mg Oral Daily Emmanuel Moreno MD   5 mg at 01/14/20 0857    finasteride tablet 5 mg  5 mg Oral Daily Riley Hatfield MD   5 mg at 01/14/20 0855    gabapentin capsule 300 mg   300 mg Oral TID Kennedi Bose MD   300 mg at 01/14/20 0855    HYDROmorphone injection 0.5 mg  0.5 mg Intravenous Q2H PRN Riley Monk MD   0.5 mg at 01/14/20 1037    HYDROmorphone tablet 2 mg  2 mg Oral Q3H PRN Riley Monk MD   2 mg at 01/14/20 0231    HYDROmorphone tablet 4 mg  4 mg Oral Q3H PRN Riley Monk MD   4 mg at 01/14/20 0853    lidocaine 5 % patch 2 patch  2 patch Transdermal Q24H Riley Monk MD   2 patch at 01/13/20 0903    pantoprazole EC tablet 40 mg  40 mg Oral Daily Riley Hatfield MD   40 mg at 01/14/20 0855    polyethylene glycol packet 17 g  17 g Oral Daily Emmanuel Moreno MD   17 g at 01/13/20 0902    senna-docusate 8.6-50 mg per tablet 2 tablet  2 tablet Oral Nightly PRN Riley Hatfield MD        tamsulosin 24 hr capsule 0.4 mg  0.4 mg Oral Daily Riley Hatfield MD   0.4 mg at 01/14/20 0855    tiZANidine tablet 2 mg  2 mg Oral Q8H Riley Monk MD   2 mg at 01/14/20 0605       Assessment:     Pain control adequate    Plan:  - Continue scheduled Tylenol 1000mg q6h.  - Continue gabapentin to 300mg TID.  Titrate as tolerated / as renal function tolerates.  - Start tizanidine 2mg TID.  Titrate as tolerated / as renal function tolerates.  - Continue lidocaine patches x2 to back.  - Continue Dilaudid 2mg po q3h PRN moderate pain  - Continue Dilaudid 4mg po q3h PRN severe pain  - Continue Dilaudid 0.5mg IV q2h PRN breakthrough pain  - Continue tizanidine 2mg q8h PO  - We will follow up with the patient.

## 2020-01-15 NOTE — ASSESSMENT & PLAN NOTE
Patient does have hypercalcemia most likely from malignancy in the setting of metastatic RCC that has been present since 11/2019 per chart review. Ca on admissino 12 (corrected for albumin at 12.5). No symptoms    Plan:  - s/p Zometa 4 mg IV once. (1/9/20).  - Calcium 8.2  - Resolved

## 2020-01-15 NOTE — BRIEF OP NOTE
[facility]  Brief Operative Note    SUMMARY     Surgery Date: 1/14/2020     Surgeon(s) and Role:     * Zackery Olivares MD - Primary    Assisting Surgeon: Parrish Gotti MD    Pre-op Diagnosis:  Back pain [M54.9]  Intradural mass [G95.89]  Lumbar foraminal stenosis [M48.061]    Post-op Diagnosis:  Post-Op Diagnosis Codes:     * Back pain [M54.9]     * Intradural mass [G95.89]     * Lumbar foraminal stenosis [M48.061]    Procedure(s) (LRB):  L2  laminectomy for resection of intradural mass     Anesthesia: General      Estimated Blood Loss: 300 mL         Specimens:   Specimen (12h ago, onward)    None

## 2020-01-15 NOTE — ANESTHESIA POST-OP PAIN MANAGEMENT
Acute Pain Service Progress Note    Raymundo Richard is a 69 y.o., male with a hx of stage IV RCC (s/p right nephrectomy in 2013) with metastasis to lung and bone admitted to the hospital due to severe back pain 2/2 lumbar spinal mass.    Surgery: L2  laminectomy for resection of intradural mass    Post Op Day #: 1    Problem List:    Active Hospital Problems    Diagnosis  POA    *Preoperative clearance [Z01.818]  Not Applicable    Hypertension [I10]  Unknown    Advanced care planning/counseling discussion [Z71.89]  Not Applicable    Goals of care, counseling/discussion [Z71.89]  Not Applicable    Palliative care encounter [Z51.5]  Not Applicable    Pre-op testing [Z01.818]  Not Applicable    Intradural mass [G95.89]  Yes    Lumbar foraminal stenosis [M48.061]  Yes    Hypercalcemia of malignancy [E83.52]  Unknown    Back pain [M54.9]  Yes    Metastatic renal cell carcinoma [C64.9]  Yes      Resolved Hospital Problems   No resolved problems to display.       Subjective:     General appearance of alert, oriented, no complaints   Pain with rest: 7/10   Side Effects    1. Pruritis No    2. Nausea No    3. Sedation No, 1=awake and alert       Vitals   Vitals:    01/15/20 0746   BP:    Pulse: 68   Resp:    Temp:         Meds   Current Facility-Administered Medications   Medication Dose Route Frequency Provider Last Rate Last Dose    0.9%  NaCl infusion   Intravenous Continuous Parrish Gotti  mL/hr at 01/14/20 2125      acetaminophen tablet 1,000 mg  1,000 mg Oral Q6H Riley Monk MD   1,000 mg at 01/15/20 0535    acetaminophen tablet 650 mg  650 mg Oral Q4H PRN Parrish Gotti MD        acetaminophen tablet 650 mg  650 mg Oral Q6H PRN Parrish Gotti MD        albuterol sulfate nebulizer solution 2.5 mg  2.5 mg Nebulization Q4H PRN Randi Watts MD   2.5 mg at 01/14/20 2058    aluminum-magnesium hydroxide-simethicone 200-200-20 mg/5 mL suspension 30 mL  30 mL Oral Q4H PRN Parrish Gotti MD         amLODIPine tablet 5 mg  5 mg Oral Daily Emmanuel Moreno MD   5 mg at 01/15/20 0811    bisacodyl suppository 10 mg  10 mg Rectal Daily Parrish Gotti MD        ceFAZolin injection 2 g  2 g Intravenous Q8H Parrish Gotti MD   2 g at 01/15/20 0812    dexamethasone injection 4 mg  4 mg Intravenous Q6H Parrish Gotti MD   4 mg at 01/15/20 0535    diazePAM tablet 5 mg  5 mg Oral Q6H PRN Parrish Gotti MD        finasteride tablet 5 mg  5 mg Oral Daily Riley Hatfield MD   5 mg at 01/15/20 0811    gabapentin capsule 300 mg  300 mg Oral TID Kennedi Bose MD   300 mg at 01/15/20 0811    heparin (porcine) injection 5,000 Units  5,000 Units Subcutaneous Q8H Parrish Gotti MD   5,000 Units at 01/15/20 0535    HYDROmorphone tablet 2 mg  2 mg Oral Q3H PRN Riley Monk MD   2 mg at 01/14/20 0231    HYDROmorphone tablet 4 mg  4 mg Oral Q3H PRN Riley Monk MD   4 mg at 01/14/20 0853    lidocaine 5 % patch 2 patch  2 patch Transdermal Q24H Riley Monk MD   2 patch at 01/15/20 0812    morphine injection 1 mg  1 mg Intravenous Q1H PRN Vaishnavi Gonzalez MD   1 mg at 01/15/20 0807    mupirocin 2 % ointment   Nasal BID Parrish Gotti MD        ondansetron disintegrating tablet 8 mg  8 mg Oral Q6H PRN Parrish Gotti MD        pantoprazole EC tablet 40 mg  40 mg Oral Daily Riley Hatfield MD   40 mg at 01/15/20 0810    polyethylene glycol packet 17 g  17 g Oral Daily Emmanuel Moreno MD   17 g at 01/15/20 0812    promethazine (PHENERGAN) 6.25 mg in dextrose 5 % 50 mL IVPB  6.25 mg Intravenous Q6H PRN Parrish Gotti MD        senna-docusate 8.6-50 mg per tablet 2 tablet  2 tablet Oral Nightly PRN Riley Hatfield MD        sodium chloride 0.9% flush 10 mL  10 mL Intravenous PRN Kimberlee Benitez MD        tamsulosin 24 hr capsule 0.4 mg  0.4 mg Oral Daily Riley Hatfield MD   0.4 mg at 01/15/20 0810    tiZANidine tablet 2 mg  2 mg Oral Q8H Riley Monk MD   2 mg at 01/15/20 0503      Assessment:  Mr. Richard is POD #1 and is controlled on his current pain regimen at this time.     Plan:  - Gabapentin 300mg tid  - Tylenol 1g q6h  - Dilaudid 4mg q3h prn for severe pain  - Dilaudid 2mg q3h prn for moderate pain  - IV Morphine 1mg q1h prn IF he is unable to tolerate oral medications.

## 2020-01-15 NOTE — SUBJECTIVE & OBJECTIVE
Interval History: Tolerated procedure well yesterday. Pain well controlled. Ca++ 8.2    Review of Systems  Objective:     Vital Signs (Most Recent):  Temp: 97.9 °F (36.6 °C) (01/15/20 1056)  Pulse: 92 (01/15/20 1142)  Resp: 17 (01/15/20 1056)  BP: (!) 118/56 (01/15/20 1056)  SpO2: (!) 94 % (01/15/20 1056) Vital Signs (24h Range):  Temp:  [97.9 °F (36.6 °C)-100 °F (37.8 °C)] 97.9 °F (36.6 °C)  Pulse:  [] 92  Resp:  [16-59] 17  SpO2:  [91 %-100 %] 94 %  BP: (115-181)/() 118/56     Weight: 68.1 kg (150 lb 2.1 oz)  Body mass index is 24.98 kg/m².    Intake/Output Summary (Last 24 hours) at 1/15/2020 1155  Last data filed at 1/15/2020 0800  Gross per 24 hour   Intake 5155 ml   Output 4310 ml   Net 845 ml      Physical Exam   Constitutional: He is oriented to person, place, and time. He appears well-developed and well-nourished. No distress.   HENT:   Head: Normocephalic and atraumatic.   Mouth/Throat: Oropharynx is clear and moist.   Eyes: Pupils are equal, round, and reactive to light.   Neck: Normal range of motion. Neck supple. No JVD present.   Cardiovascular: Normal rate, regular rhythm and normal heart sounds.   No murmur heard.  Pulmonary/Chest: Effort normal and breath sounds normal. No respiratory distress. He has no wheezes. He has no rales.   Abdominal: Soft. Bowel sounds are normal. He exhibits no distension. There is no tenderness.   YOHANNES drain in place    Musculoskeletal: Normal range of motion. He exhibits no edema or deformity.   Neurological: He is alert and oriented to person, place, and time. No sensory deficit. He exhibits normal muscle tone.   strenght 4/5 in LEs and 5/5 in upper extremities   Skin: Skin is warm.   Vitals reviewed.      Significant Labs:   CBC:   Recent Labs   Lab 01/14/20  0539 01/14/20  2042 01/15/20  0537   WBC 7.54 7.05 8.27   HGB 11.2* 10.0* 9.7*   HCT 36.5* 32.7* 30.8*    169 209     CMP:   Recent Labs   Lab 01/14/20  0539 01/14/20  2042 01/15/20  0537     139 139   K 4.2 4.1 3.7    111* 110   CO2 22* 16* 20*   GLU 98 147* 155*   BUN 19 18 18   CREATININE 1.4 1.3 1.2   CALCIUM 9.9 8.5* 8.2*   ANIONGAP 8 12 9   EGFRNONAA 50.9* 55.7* >60.0       Significant Imaging: I have reviewed all pertinent imaging results/findings within the past 24 hours.

## 2020-01-15 NOTE — ASSESSMENT & PLAN NOTE
Pt not on any BP at home. BP here has been in the 140-175 systolics. He is in pain which can be contributing, however given persistently high BP despite pain meds will start him on a regimen.    Plan   - Amlodipine 5 mg daily  - Optimal BP

## 2020-01-15 NOTE — ASSESSMENT & PLAN NOTE
Pt is a 69yom with pmh RCC s/p nephrectomy 2013 now with known pulmonary metastasis who presented to outside hospital with acutely worsening back pain with spells of tussive lower extremity pain and weakness. MRI revealed a homogenously enhancing lesion in the lumbar spine. Pt transferred for neurosurgical evaluation. 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    - POD#1, doing well postoperatively. Neurologically stable on exam  - All labs and diagnostics personally reviewed  - Continue neuro checks q4h  - MRI lumbar spine w contrast 1/7 revealed homogenously enhancing intradural mass at L2. L4-5 disc bulge and severe facet arthropathy at this level result in severe canal stenosis and severe right neural foraminal narrowing and moderate left neural foraminal narrowing.   - MRI C/T spine w contrast 1/9 with no other enhancing intradural or bony metastasis noted, no significant canal compromise or cord signal changes. Intramuscular lesion noted in right paraspinal muscles of cervical region.   - HOB flat x 24hrs postop after intradural procedure. No signs/symptoms of CSF leak. Okay to DC HOB restrictions at 5pm today. Continue to monitor for CSF leak (positional headaches, lethargy). Avoid straining and valsalva maneuvers.  - Continue HV drain to gravity, monitor for CSF leakage. Empty and record output q4h. Ancef for ppx while drain in place.  - Dex IV 4mg q6h x 2 days postop. Continue PPI while on steroids.  - No brace needed  - Pain Control: Currently controlled. APS following for pain management, appreciate assistance:   - Gabapentin 300mg tid   - Tylenol 1g q6h   - Dilaudid 4mg q3h prn for severe pain   - Dilaudid 2mg q3h prn for moderate pain   - IV Morphine 1mg q1h prn IF he is unable to tolerate oral medications.   - Metastatic RCC: Pt has a hx of metastatic RCC (diffuse bony mets, pulmonary mets) and new pancreatic mass that has not responded to  "chemotherapy.    - Last visit w/ oncologist Dr. Anna 3/8/2019, pt elected not to have a biopsy done of the pancreatic mass to determine if it is pancreatic cancer. He elected to stop chemotherapy and end of life counseling/hospice was discussed. Per Dr. Anna's note, "The patient communicated that if he were comatose and had little chance of a meaningful recovery, he would not want machines/life-sustaining treatments used."    - Medical oncology saw patient 1/9, appreciate assistance. Pt does not want biopsy at this time for pancreatic mass and would like to focus on treatment for back pain. Will follow-up with his oncologist at discharge.   - Preoperative Clearance: Documented risk assessment by the [employer] found in HM Consult note dated 1/9/2019.  - Hypercalcemia: 2/2 malignancy, now resolved. Appreciate HM assistance. s/p Zometa 4 mg IV once (1/9/20). Ca 8.2 today. CTM.  - HTN: Not on any home meds for BP, elevated on admission. Pain likely contributory. HM following, appreciate recs. Currently controlled. Continue amlodipine 5mg daily.  - Palliative Care Encounter: Palliative care team consulted, appreciate assistance:  - Continue current pain management plan. Will reassess post operatively. Appears amenable to DNR order - primary team to assess and write orders as appropriate. Would benefit from continued information and education regarding clinical condtition. Amenable to home palliative care - pal med will facilitate referral   - Palliative medicine will continue to follow for goals of care and advanced care planning.    -DVT prophylaxis: KENTON's, SCD's, SQH  -Bowel regimen: senna and miralax, suppository PRN  -Atelectasis prophylaxis: IS q1h while awake, PT/OT/OOB when HOB restrictions DC'd.    Dispo: Pending mobilization after HOB restrictions removed, drain removal, pain control, PT/OT eval and recs.         "

## 2020-01-15 NOTE — NURSING
Pt arrived to unit via stretcher with x1 transporter in attendance.  A/O x4.  Respirations unlabored.  Skin w/d.  VSS.  Able to verbalize wants/needs.  C/o back pain 6/10 but reports is tolerable at present.  HOB remains flat as ordered.  Daughter at bedside.  Bed locked/low.  SR up x2.  Call light in reach.

## 2020-01-15 NOTE — PROGRESS NOTES
[facility] Hospital Medicine  Progress Note    Patient Name: Raymundo Richard  MRN: [id number]  Patient Class: IP- Inpatient   Admission Date: 1/8/2020  Length of Stay: 7 days  Attending Physician: Zackery Olivares MD  Primary Care Provider: Sekou Lara III, MD    Subjective:     Principal Problem:Metastatic renal cell carcinoma     HPI:  70 yo male patient with history of stage IV RCC s/p right Nephrectomy in 2013, with metastasis to lung found on lung biopsy in 08/2018, and further extension to to pancreas and bones noticed on CT from March 4 2019, coming as a transfer from [place] for NSGY evaluation.   He went to the ER in [place] on 1/7 due to a 1 month history of worsening back pain associated with LE weakness, MRI of the spine revealed a hyperintense lesion in T2 concerning for neurofibroma, however, given his metastatic disease he was referred here for further evaluation by NSGY. Hospital Medicine was consulted for surgical clearance.     Patient denies any history of DM, HTN, strokes, CAD, PAD, COPD or any comorbidity other than BPH treated with flomax. He lives by himself and is independent for ADLS. Prior to his back pain and weakness 1 month ago,  he used to do some exercise without limitations. Denies any exertional SOB, chest pain, LE edema. He does mention orthopnea for many years, needing to sleep with some degree of head elevation but states he was told this was from LEONARD. He was prescribed a CPAP many years ago but has not used it in the last 2 years because he has lost weight and feels he does not need it anymore.     Overview/Hospital Course:  No notes on file    Interval History: Tolerated procedure well yesterday. Pain well controlled. Ca++ 8.2    Review of Systems  Objective:     Vital Signs (Most Recent):  Temp: 97.9 °F (36.6 °C) (01/15/20 1056)  Pulse: 92 (01/15/20 1142)  Resp: 17 (01/15/20 1056)  BP: (!) 118/56 (01/15/20 1056)  SpO2: (!) 94 % (01/15/20 1056) Vital Signs  (24h Range):  Temp:  [97.9 °F (36.6 °C)-100 °F (37.8 °C)] 97.9 °F (36.6 °C)  Pulse:  [] 92  Resp:  [16-59] 17  SpO2:  [91 %-100 %] 94 %  BP: (115-181)/() 118/56     Weight: 68.1 kg (150 lb 2.1 oz)  Body mass index is 24.98 kg/m².    Intake/Output Summary (Last 24 hours) at 1/15/2020 1155  Last data filed at 1/15/2020 0800  Gross per 24 hour   Intake 5155 ml   Output 4310 ml   Net 845 ml      Physical Exam   Constitutional: He is oriented to person, place, and time. He appears well-developed and well-nourished. No distress.   HENT:   Head: Normocephalic and atraumatic.   Mouth/Throat: Oropharynx is clear and moist.   Eyes: Pupils are equal, round, and reactive to light.   Neck: Normal range of motion. Neck supple. No JVD present.   Cardiovascular: Normal rate, regular rhythm and normal heart sounds.   No murmur heard.  Pulmonary/Chest: Effort normal and breath sounds normal. No respiratory distress. He has no wheezes. He has no rales.   Abdominal: Soft. Bowel sounds are normal. He exhibits no distension. There is no tenderness.   YOHANNES drain in place    Musculoskeletal: Normal range of motion. He exhibits no edema or deformity.   Neurological: He is alert and oriented to person, place, and time. No sensory deficit. He exhibits normal muscle tone.   strenght 4/5 in LEs and 5/5 in upper extremities   Skin: Skin is warm.   Vitals reviewed.      Significant Labs:   CBC:   Recent Labs   Lab 01/14/20  0539 01/14/20  2042 01/15/20  0537   WBC 7.54 7.05 8.27   HGB 11.2* 10.0* 9.7*   HCT 36.5* 32.7* 30.8*    169 209     CMP:   Recent Labs   Lab 01/14/20  0539 01/14/20  2042 01/15/20  0537    139 139   K 4.2 4.1 3.7    111* 110   CO2 22* 16* 20*   GLU 98 147* 155*   BUN 19 18 18   CREATININE 1.4 1.3 1.2   CALCIUM 9.9 8.5* 8.2*   ANIONGAP 8 12 9   EGFRNONAA 50.9* 55.7* >60.0       Significant Imaging: I have reviewed all pertinent imaging results/findings within the past 24 hours.    Assessment/Plan:  "     * Metastatic renal cell carcinoma  Patient with stage IV RCC (mets to lungs, bone and pancreas) despite therapy with sorafenib. Followed by hem/onc, last seen 03/2019 by Dr Anna per chart review. Per Dr Anna's note at that time " We discussed different extreme health states that he could experience, and reviewed what kind of medical care he would want in those situations.  The patient communicated that if he were comatose and had little chance of a meaningful recovery, he would not want machines/life-sustaining treatments used.   The patient has  completed a living will to reflect these preferences"    Plan  - F/u Palliative care recs  - Onc f/u as outpatient    Hypertension  Pt not on any BP at home. BP here has been in the 140-175 systolics. He is in pain which can be contributing, however given persistently high BP despite pain meds will start him on a regimen.    Plan   - Amlodipine 5 mg daily  - Optimal BP     Hypercalcemia of malignancy  Patient does have hypercalcemia most likely from malignancy in the setting of metastatic RCC that has been present since 11/2019 per chart review. Ca on admissino 12 (corrected for albumin at 12.5). No symptoms    Plan:  - s/p Zometa 4 mg IV once. (1/9/20).  - Calcium 8.2  - Resolved     Preoperative clearance  70 yo male patient with history of stage IV RCC s/p right Nephrectomy in 2013, with metastasis to lung found on lung biopsy in 08/2018, and further extension to to pancreas and bones noticed on CT from March 4 2019, coming as a transfer from [place] for NSGY evaluation due to L2 spinal lesion found on MRI, suspicious for metastasis vs neurofibroma. Hospital Medicine was consulted for surgical clearance. Patient denies any history of DM, HTN, strokes, CAD, PAD, COPD or any comorbidity other than BPH treated with flomax. He lives by himself and is independent for ADLS. No sx/sx of heart failure. No EKG or prior echos in chart.    Plan:   - Surgical risk assessed. See " image on note (1/9/20).    Back pain    Pt came with a 1 month history of back pain, MRI of spine shows a hyperintense lesion in L2, possible neurofibroma vs metastatic lesion from his RCC.    Plan   - Pt is at risk of medication accumulation given decreased kidney function. Pt found very somnolent this morning.  - Would change methocarbamol to PRN and decrease Gabapentin to 300 mg BID.   - Avoid NSAIDs given CKD.    VTE Risk Mitigation (From admission, onward)         Ordered     heparin (porcine) injection 5,000 Units  Every 8 hours      01/14/20 1936     IP VTE HIGH RISK PATIENT  Once      01/14/20 1936     Place KENTON hose  Until discontinued      01/08/20 2135     Place sequential compression device  Until discontinued      01/08/20 2135              Hospital medicine will sign off. Please call for any questions. Thank you!    Jones Song MD  Department of Hospital Medicine   [facility]

## 2020-01-15 NOTE — OP NOTE
DATE OF SURGERY: 1/14/20     PREOPERATIVE DIAGNOSIS:  1. 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 Axial low back pain and bilateral radicular leg pain  3. History of disseminated renal cell CA     POSTOPERATIVE DIAGNOSIS:  Same.     PROCEDURE PERFORMED:  1. L2 laminectomy for resection of intradural extramedullary spinal cord tumor, L2  2. Use of operative microscope for microdissection  3. Use of intraoperative neuromonitoring with MEPs  4. Use of intraoperative flouroscopy     PRIMARY SURGEON: Zackery Olivares M.D.    ASSISTANT: Parrish Gotti M.D.     ANESTHESIA: GETA     ESTIMATED BLOOD LOSS: 300mL     COMPLICATIONS: None     DRAINS: One deep     SPECIMENS SENT: Spinal tumor for permanent pathology     FINDINGS: Vascular tumor with infiltrating cauda equina nerve roots     INDICATIONS:     This is a 69-year-old male who presents with axial low back pain and bilateral radicular leg pain which is preventing ambulation.  He has known metastatic renal cell carcinoma with wide dissemination.  Spinal imaging revealed an intradural extramedullary mass lesion behind the body of L2.  Differential included nerve sheath tumors as well as metastatic tumor.  Additionally he had degenerative stenosis at L4-5.  My recommendation was to debulk the tumor at L2 for palliation.    I explained the risks, benefits, alternatives, indications and methods of the procedure in detail. The patient voiced understanding, and all questions were answered. No guarantees were made. The patient agreed to proceed as planned.    PROCEDURE:     The patient was brought into the operating room, where he was intubated and placed under general anesthesia without difficulty.  All lines were placed.  He was repositioned prone onto the operating room table.  AP x-rays were used to localize from L2 to L3.  The skin was marked prepped and draped in the usual sterile fashion.  A timeout was performed prior to the procedure.  10 mL  of lidocaine with epinephrine was injected into the skin.     A midline linear incision was made with a 10 blade from approximately L2 to L3.  Supra and subfascial dissection was carried out in the midline with Bovie electrocautery.  Subperiosteal dissection was carried out with Bovie electrocautery and [place] elevators to expose the posterior elements from L2 to L3.  Levels were confirmed on lateral x-ray.  The laminectomy at L2 was then performed, using the high speed drill, rongeurs and Kerrison punches.   The lamina was removed en bloc.  The dura was then inspected and found to be grossly normal.  The microscope was then brought into the field for microdissection.     A linear dural opening was made with a 15 blade, and the dural leaflets were tacked up with 4-0 Nurolon sutures.  The arachnoid plane was opened with an 11 blade and tacked to the dura leaflets with hemoclips.   the tumor then came into view.  It was a soft pink, vascular tumor with adherent nerve roots of the cauda equina attached to the capsule, and nerve roots running through the tumor.  Using microsurgical technique the borders of the tumor were dissected free from the nerve roots that were adherent to its capsule.  Tumor was then internally debulked with bipolar forceps and pituitary rongeurs.  At the end of our debulking, approximately 50% of the tumor had been removed.  Adequate decompression of the thecal sac was achieved.  Hemostasis was achieved with bipolar electrocautery and FloSeal.  The arachnoid space was copiously irrigated with sterile normal saline.  The dura was closed in a watertight fashion with a running 6 0 Prolene.  The closure was buttressed with myofascial plugs and Evaseal glue. The microscope was taken out of the field.     The wound was copiously irrigated with normal saline. A watertight fascial closure was achieved with interrupted Vicryl sutures and a running Stratafix suture. A deep Hemovac drain was placed and  tunneled through a separate incision, where it was secured with a Vicryl suture. The soft tissues were closed in layers.  The skin was closed with staples.  A silver nitrate dressing was applied.     The patient appeared to tolerate the procedure well from a hemodynamic and neuromonitoring standpoint. MEPs were present and stable in all extremities throughout the case. He was repositioned supine onto the hospital bed. He was extubated and sent to the PACU in stable condition for recovery. I was present for all critical portions of the case, and at the end of the case all counts were correct.     JUSTIFICATION OF MODIFIER 22:  This was a complex intradural extramedullary tumor resection in a patient with metastatic CA. The inherent risks of the procedure were elevated due to his mets -- particularly with regard to wound healing and infection risk. Additionally, the characteristics of the tumor were more challenging than normal given that it was highly vascular and adherent to most of the nerve roots of the cauda equina. It required significantly increased preoperative planning and management, mental effort, technical skill, and time to perform every aspect of this procedure.

## 2020-01-15 NOTE — ANESTHESIA RELEASE NOTE
"Anesthesia Release from PACU Note    Patient: Raymundo Richard    Procedure(s) Performed: Procedure(s) (LRB):  L2-3 laminectomy for resection of intradural mass and L4-5 laminectomy; Microscope and micro instruments and neuromonitoring.  No instrumentation hardware (N/A)    Anesthesia type: general    Post pain: Adequate analgesia    Post assessment: no apparent anesthetic complications and tolerated procedure well    Last Vitals:   Visit Vitals  /60   Pulse (!) 121   Temp 37.2 °C (99 °F) (Temporal)   Resp (!) 26   Ht 5' 5" (1.651 m)   Wt 68.1 kg (150 lb 2.1 oz)   SpO2 (!) 94%   BMI 24.98 kg/m²       Post vital signs: stable    Level of consciousness: alert  and oriented    Nausea/Vomiting: no nausea/no vomiting    Complications: none    Airway Patency: patent    Respiratory: unassisted    Cardiovascular: stable, blood pressure at baseline and tachycardic    Hydration: hypovolemic  "

## 2020-01-15 NOTE — SUBJECTIVE & OBJECTIVE
CARDIOVASCULAR - ADULT    • Maintains optimal cardiac output and hemodynamic stability Progressing    • Absence of cardiac arrhythmias or at baseline Progressing        RESPIRATORY - ADULT    • Achieves optimal ventilation and oxygenation Progressing Interval History: EMYEON. POD#1 from lumbar laminectomy with tumor resection. HOB remains flat x 24hrs postop, no signs/symptoms of CSF leak. HV drain to gravity with minimal output. Will DC HOB restrictions today at 5pm. Pt resting in bed in no distress, daughter at bedside. Pt reports back pain 8/10, controlled with PRN pain meds and improved since before surgery. Otherwise no acute complaints. Denies headaches, vision changes, weakness, paresthesias, and b/b dysfunction. PT/OT ordered for tomorrow.    Medications:  Continuous Infusions:   sodium chloride 0.9% 100 mL/hr at 01/14/20 2125     Scheduled Meds:   acetaminophen  1,000 mg Oral Q6H    amLODIPine  5 mg Oral Daily    bisacodyl  10 mg Rectal Daily    ceFAZolin (ANCEF) IVPB  2 g Intravenous Q8H    dexamethasone  4 mg Intravenous Q6H    finasteride  5 mg Oral Daily    gabapentin  300 mg Oral TID    heparin (porcine)  5,000 Units Subcutaneous Q8H    lidocaine  2 patch Transdermal Q24H    mupirocin   Nasal BID    pantoprazole  40 mg Oral Daily    polyethylene glycol  17 g Oral Daily    tamsulosin  0.4 mg Oral Daily    tiZANidine  2 mg Oral Q8H     PRN Meds:acetaminophen, acetaminophen, albuterol sulfate, aluminum-magnesium hydroxide-simethicone, diazePAM, HYDROmorphone, HYDROmorphone, morphine, ondansetron, promethazine (PHENERGAN) IVPB, senna-docusate 8.6-50 mg, sodium chloride 0.9%     Review of Systems  Objective:     Weight: 68.1 kg (150 lb 2.1 oz)  Body mass index is 24.98 kg/m².  Vital Signs (Most Recent):  Temp: 97.9 °F (36.6 °C) (01/15/20 1056)  Pulse: 92 (01/15/20 1142)  Resp: 17 (01/15/20 1056)  BP: (!) 118/56 (01/15/20 1056)  SpO2: (!) 94 % (01/15/20 1056) Vital Signs (24h Range):  Temp:  [97.9 °F (36.6 °C)-100 °F (37.8 °C)] 97.9 °F (36.6 °C)  Pulse:  [] 92  Resp:  [16-59] 17  SpO2:  [91 %-100 %] 94 %  BP: (115-181)/() 118/56     Date 01/15/20 0700 - 01/16/20 0659   Shift 0825-9690 4873-5175 0072-8877 24 Hour Total   INTAKE  "  Shift Total(mL/kg)       OUTPUT   Drains 0   0   Shift Total(mL/kg) 0(0)   0(0)   Weight (kg) 68.1 68.1 68.1 68.1                        Closed/Suction Drain 01/14/20 1855 Posterior Back Accordion 10 Fr. (Active)   Site Description Unable to view 1/15/2020 11:28 AM   Dressing Type Other (Comment) 1/15/2020 12:00 AM   Dressing Status Clean;Dry;Intact 1/15/2020 11:28 AM   Status Open to gravity drainage 1/15/2020 11:28 AM   Output (mL) 0 mL 1/15/2020  8:00 AM            Urethral Catheter 01/14/20 1612 Non-latex 16 Fr. (Active)   Site Assessment Clean;Intact 1/15/2020 11:28 AM   Collection Container Urimeter 1/15/2020 11:28 AM   Securement Method secured to top of thigh w/ adhesive device 1/15/2020 11:28 AM   Catheter Care Performed yes 1/15/2020 11:28 AM   Reason for Continuing Urinary Catheterization Post operative 1/15/2020 11:28 AM   CAUTI Prevention Bundle StatLock in place w 1" slack 1/15/2020  8:00 AM   Output (mL) 900 mL 1/15/2020  5:00 AM       Neurosurgery Physical Exam    General: well developed, well nourished, no distress.   Head: normocephalic, atraumatic  Neck: No tracheal deviation. No palpable masses.   Neurologic: Alert and oriented. Thought content appropriate.  GCS: Motor: 6/Verbal: 5/Eyes: 4 GCS Total: 15  Mental Status: Awake, Alert, Oriented x 4  Language: No aphasia  Speech: No dysarthria  Cranial nerves: face symmetric, tongue midline, CN II-XII grossly intact.   Eyes: pupils equal, round, reactive to light with accomodation, EOMI.    Ears: No drainage.   Pulmonary: normal respirations, no signs of respiratory distress  Abdomen: soft, non-distended, not tender to palpation  Sensory: intact to light touch throughout  Motor Strength: Moves all extremities spontaneously with good tone.  Pain limited weakness to BLE proximally. Otherwise full strength upper and lower extremities. No abnormal movements seen.      Strength   Deltoids Triceps Biceps Wrist Extension Wrist Flexion Hand    Upper: R " 5/5 5/5 5/5 5/5 5/5 5/5     L 5/5 5/5 5/5 5/5 5/5 5/5       Iliopsoas Quadriceps Knee  Flexion Tibialis  anterior Gastro- cnemius EHL   Lower: R 4+/5 4+/5 4+/5 5/5 5/5 5/5     L 4+/5 4+/5 4+/5 5/5 5/5 5/5      Hough: absent  Clonus: absent  Vascular: Pulses 2+ and symmetric radial and dorsalis pedis. No LE edema.   Skin: Skin is warm, dry and intact.    Lumbar Incision: Clean, dry, intact, covered with Silver Nitrate dressing. No surrounding erythema or edema. No drainage or TTP.     HV drain x 1 intact to gravity with minimal serosanguinous output, no clear drainage.    Significant Labs:  Recent Labs   Lab 01/14/20  0539 01/14/20  2042 01/15/20  0537   GLU 98 147* 155*    139 139   K 4.2 4.1 3.7    111* 110   CO2 22* 16* 20*   BUN 19 18 18   CREATININE 1.4 1.3 1.2   CALCIUM 9.9 8.5* 8.2*   MG  --  1.6  --      Recent Labs   Lab 01/14/20  0539 01/14/20  2042 01/15/20  0537   WBC 7.54 7.05 8.27   HGB 11.2* 10.0* 9.7*   HCT 36.5* 32.7* 30.8*    169 209     No results for input(s): LABPT, INR, APTT in the last 48 hours.  Microbiology Results (last 7 days)     ** No results found for the last 168 hours. **        All pertinent labs from the last 24 hours have been reviewed.    Significant Diagnostics:  I have reviewed and interpreted all pertinent imaging results/findings within the past 24 hours.

## 2020-01-15 NOTE — ASSESSMENT & PLAN NOTE
68 yo male patient with history of stage IV RCC s/p right Nephrectomy in 2013, with metastasis to lung found on lung biopsy in 08/2018, and further extension to to pancreas and bones noticed on CT from March 4 2019, coming as a transfer from [place] for NSGY evaluation due to L2 spinal lesion found on MRI, suspicious for metastasis vs neurofibroma. Hospital Medicine was consulted for surgical clearance. Patient denies any history of DM, HTN, strokes, CAD, PAD, COPD or any comorbidity other than BPH treated with flomax. He lives by himself and is independent for ADLS. No sx/sx of heart failure. No EKG or prior echos in chart.    Plan:   - Surgical risk assessed. See image on note (1/9/20).     all other ROS negative except as per HPI

## 2020-01-15 NOTE — TRANSFER OF CARE
"Anesthesia Transfer of Care Note    Patient: Raymundo Richard    Procedure(s) Performed: Procedure(s) (LRB):  L2-3 laminectomy for resection of intradural mass and L4-5 laminectomy; Microscope and micro instruments and neuromonitoring.  No instrumentation hardware (N/A)    Patient location: PACU    Anesthesia Type: general    Transport from OR: Transported from OR on 6-10 L/min O2 by face mask with adequate spontaneous ventilation    Post pain: adequate analgesia    Post assessment: no apparent anesthetic complications and tolerated procedure well    Post vital signs: stable    Level of consciousness: awake and alert    Nausea/Vomiting: no nausea/vomiting    Complications: none    Transfer of care protocol was followed      Last vitals:   Visit Vitals  BP (!) 169/87 (BP Location: Right arm, Patient Position: Lying)   Pulse 101   Temp 37 °C (98.6 °F) (Temporal)   Resp (!) 35   Ht 5' 5" (1.651 m)   Wt 68.1 kg (150 lb 2.1 oz)   SpO2 100%   BMI 24.98 kg/m²     "

## 2020-01-15 NOTE — PROGRESS NOTES
[facility]  Neurosurgery  Progress Note    Subjective:     History of Present Illness: Pt is a 69yom with pmh RCC s/p nephrectomy 2013 now with known pulmonary metastasis who presented to outside hospital with acutely worsening back pain with spells of tussive lower extremity pain and weakness. MRI revealed a homogenously enhancing lesion in the lumbar spine. Pt transferred for neurosurgical evaluation.    Post-Op Info:  Procedure(s) (LRB):  L2-3 laminectomy for resection of intradural mass and L4-5 laminectomy; Microscope and micro instruments and neuromonitoring.  No instrumentation hardware (N/A)   1 Day Post-Op     Interval History: NAEON. POD#1 from lumbar laminectomy with tumor resection. HOB remains flat x 24hrs postop, no signs/symptoms of CSF leak. HV drain to gravity with minimal output. Will DC HOB restrictions today at 5pm. Pt resting in bed in no distress, daughter at bedside. Pt reports back pain 8/10, controlled with PRN pain meds and improved since before surgery. Otherwise no acute complaints. Denies headaches, vision changes, weakness, paresthesias, and b/b dysfunction. PT/OT ordered for tomorrow.    Medications:  Continuous Infusions:   sodium chloride 0.9% 100 mL/hr at 01/14/20 2125     Scheduled Meds:   acetaminophen  1,000 mg Oral Q6H    amLODIPine  5 mg Oral Daily    bisacodyl  10 mg Rectal Daily    ceFAZolin (ANCEF) IVPB  2 g Intravenous Q8H    dexamethasone  4 mg Intravenous Q6H    finasteride  5 mg Oral Daily    gabapentin  300 mg Oral TID    heparin (porcine)  5,000 Units Subcutaneous Q8H    lidocaine  2 patch Transdermal Q24H    mupirocin   Nasal BID    pantoprazole  40 mg Oral Daily    polyethylene glycol  17 g Oral Daily    tamsulosin  0.4 mg Oral Daily    tiZANidine  2 mg Oral Q8H     PRN Meds:acetaminophen, acetaminophen, albuterol sulfate, aluminum-magnesium hydroxide-simethicone, diazePAM, HYDROmorphone, HYDROmorphone, morphine, ondansetron,  "promethazine (PHENERGAN) IVPB, senna-docusate 8.6-50 mg, sodium chloride 0.9%     Review of Systems  Objective:     Weight: 68.1 kg (150 lb 2.1 oz)  Body mass index is 24.98 kg/m².  Vital Signs (Most Recent):  Temp: 97.9 °F (36.6 °C) (01/15/20 1056)  Pulse: 92 (01/15/20 1142)  Resp: 17 (01/15/20 1056)  BP: (!) 118/56 (01/15/20 1056)  SpO2: (!) 94 % (01/15/20 1056) Vital Signs (24h Range):  Temp:  [97.9 °F (36.6 °C)-100 °F (37.8 °C)] 97.9 °F (36.6 °C)  Pulse:  [] 92  Resp:  [16-59] 17  SpO2:  [91 %-100 %] 94 %  BP: (115-181)/() 118/56     Date 01/15/20 0700 - 01/16/20 0659   Shift 6200-9272 3706-6162 0996-1933 24 Hour Total   INTAKE   Shift Total(mL/kg)       OUTPUT   Drains 0   0   Shift Total(mL/kg) 0(0)   0(0)   Weight (kg) 68.1 68.1 68.1 68.1                        Closed/Suction Drain 01/14/20 1855 Posterior Back Accordion 10 Fr. (Active)   Site Description Unable to view 1/15/2020 11:28 AM   Dressing Type Other (Comment) 1/15/2020 12:00 AM   Dressing Status Clean;Dry;Intact 1/15/2020 11:28 AM   Status Open to gravity drainage 1/15/2020 11:28 AM   Output (mL) 0 mL 1/15/2020  8:00 AM            Urethral Catheter 01/14/20 1612 Non-latex 16 Fr. (Active)   Site Assessment Clean;Intact 1/15/2020 11:28 AM   Collection Container Urimeter 1/15/2020 11:28 AM   Securement Method secured to top of thigh w/ adhesive device 1/15/2020 11:28 AM   Catheter Care Performed yes 1/15/2020 11:28 AM   Reason for Continuing Urinary Catheterization Post operative 1/15/2020 11:28 AM   CAUTI Prevention Bundle StatLock in place w 1" slack 1/15/2020  8:00 AM   Output (mL) 900 mL 1/15/2020  5:00 AM       Neurosurgery Physical Exam    General: well developed, well nourished, no distress.   Head: normocephalic, atraumatic  Neck: No tracheal deviation. No palpable masses.   Neurologic: Alert and oriented. Thought content appropriate.  GCS: Motor: 6/Verbal: 5/Eyes: 4 GCS Total: 15  Mental Status: Awake, Alert, Oriented x " 4  Language: No aphasia  Speech: No dysarthria  Cranial nerves: face symmetric, tongue midline, CN II-XII grossly intact.   Eyes: pupils equal, round, reactive to light with accomodation, EOMI.    Ears: No drainage.   Pulmonary: normal respirations, no signs of respiratory distress  Abdomen: soft, non-distended, not tender to palpation  Sensory: intact to light touch throughout  Motor Strength: Moves all extremities spontaneously with good tone.  Pain limited weakness to BLE proximally. Otherwise full strength upper and lower extremities. No abnormal movements seen.      Strength   Deltoids Triceps Biceps Wrist Extension Wrist Flexion Hand    Upper: R 5/5 5/5 5/5 5/5 5/5 5/5     L 5/5 5/5 5/5 5/5 5/5 5/5       Iliopsoas Quadriceps Knee  Flexion Tibialis  anterior Gastro- cnemius EHL   Lower: R 4+/5 4+/5 4+/5 5/5 5/5 5/5     L 4+/5 4+/5 4+/5 5/5 5/5 5/5      Hough: absent  Clonus: absent  Vascular: Pulses 2+ and symmetric radial and dorsalis pedis. No LE edema.   Skin: Skin is warm, dry and intact.    Lumbar Incision: Clean, dry, intact, covered with Silver Nitrate dressing. No surrounding erythema or edema. No drainage or TTP.     HV drain x 1 intact to gravity with minimal serosanguinous output, no clear drainage.    Significant Labs:  Recent Labs   Lab 01/14/20  0539 01/14/20  2042 01/15/20  0537   GLU 98 147* 155*    139 139   K 4.2 4.1 3.7    111* 110   CO2 22* 16* 20*   BUN 19 18 18   CREATININE 1.4 1.3 1.2   CALCIUM 9.9 8.5* 8.2*   MG  --  1.6  --      Recent Labs   Lab 01/14/20  0539 01/14/20  2042 01/15/20  0537   WBC 7.54 7.05 8.27   HGB 11.2* 10.0* 9.7*   HCT 36.5* 32.7* 30.8*    169 209     No results for input(s): LABPT, INR, APTT in the last 48 hours.  Microbiology Results (last 7 days)     ** No results found for the last 168 hours. **        All pertinent labs from the last 24 hours have been reviewed.    Significant Diagnostics:  I have reviewed and interpreted all pertinent  imaging results/findings within the past 24 hours.    Assessment/Plan:     Back pain  Pt is a 69yom with pmh RCC s/p nephrectomy 2013 now with known pulmonary metastasis who presented to outside hospital with acutely worsening back pain with spells of tussive lower extremity pain and weakness. MRI revealed a homogenously enhancing lesion in the lumbar spine. Pt transferred for neurosurgical evaluation. 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    - POD#1, doing well postoperatively. Neurologically stable on exam  - All labs and diagnostics personally reviewed  - Continue neuro checks q4h  - MRI lumbar spine w contrast 1/7 revealed homogenously enhancing intradural mass at L2. L4-5 disc bulge and severe facet arthropathy at this level result in severe canal stenosis and severe right neural foraminal narrowing and moderate left neural foraminal narrowing.   - MRI C/T spine w contrast 1/9 with no other enhancing intradural or bony metastasis noted, no significant canal compromise or cord signal changes. Intramuscular lesion noted in right paraspinal muscles of cervical region.   - HOB flat x 24hrs postop after intradural procedure. No signs/symptoms of CSF leak. Okay to DC HOB restrictions at 5pm today. Continue to monitor for CSF leak (positional headaches, lethargy). Avoid straining and valsalva maneuvers.  - Continue HV drain to gravity, monitor for CSF leakage. Empty and record output q4h. Ancef for ppx while drain in place.  - Dex IV 4mg q6h x 2 days postop. Continue PPI while on steroids.  - No brace needed  - Pain Control: Currently controlled. APS following for pain management, appreciate assistance:   - Gabapentin 300mg tid   - Tylenol 1g q6h   - Dilaudid 4mg q3h prn for severe pain   - Dilaudid 2mg q3h prn for moderate pain   - IV Morphine 1mg q1h prn IF he is unable to tolerate oral medications.   - Metastatic RCC: Pt has a hx of metastatic RCC (diffuse  "bony mets, pulmonary mets) and new pancreatic mass that has not responded to chemotherapy.    - Last visit w/ oncologist Dr. Anna 3/8/2019, pt elected not to have a biopsy done of the pancreatic mass to determine if it is pancreatic cancer. He elected to stop chemotherapy and end of life counseling/hospice was discussed. Per Dr. Anna's note, "The patient communicated that if he were comatose and had little chance of a meaningful recovery, he would not want machines/life-sustaining treatments used."    - Medical oncology saw patient 1/9, appreciate assistance. Pt does not want biopsy at this time for pancreatic mass and would like to focus on treatment for back pain. Will follow-up with his oncologist at discharge.   - Preoperative Clearance: Documented risk assessment by the [employer] found in HM Consult note dated 1/9/2019.  - Hypercalcemia: 2/2 malignancy, now resolved. Appreciate HM assistance. s/p Zometa 4 mg IV once (1/9/20). Ca 8.2 today. CTM.  - HTN: Not on any home meds for BP, elevated on admission. Pain likely contributory. HM following, appreciate recs. Currently controlled. Continue amlodipine 5mg daily.  - Palliative Care Encounter: Palliative care team consulted, appreciate assistance:  - Continue current pain management plan. Will reassess post operatively. Appears amenable to DNR order - primary team to assess and write orders as appropriate. Would benefit from continued information and education regarding clinical condtition. Amenable to home palliative care - pal med will facilitate referral   - Palliative medicine will continue to follow for goals of care and advanced care planning.    -DVT prophylaxis: KENTON's, SCD's, SQH  -Bowel regimen: senna and miralax, suppository PRN  -Atelectasis prophylaxis: IS q1h while awake, PT/OT/OOB when HOB restrictions DC'd.    Dispo: Pending mobilization after HOB restrictions removed, drain removal, pain control, PT/OT eval and recs. "               Rissa Carias PA-C  Neurosurgery  [facility]

## 2020-01-15 NOTE — NURSING TRANSFER
Nursing Transfer Note      1/15/2020     Transfer From: PACU to 930    Transfer via stretcher    Transfer with cardiac monitoring    Transported by transport    Medicines sent: NS gtt infusing    Chart send with patient: Yes    Notified: daughter

## 2020-01-16 NOTE — PT/OT/SLP EVAL
Physical Therapy Evaluation    Patient Name:  Raymundo Richard   MRN:  [id number]  Admit Date: 1/8/2020  Admitting Diagnosis:  Metastatic renal cell carcinoma   Length of Stay: 8 days  Recent Surgery: Procedure(s) (LRB):  L2-3 laminectomy for resection of intradural mass and L4-5 laminectomy; Microscope and micro instruments and neuromonitoring.  No instrumentation hardware (N/A) 2 Days Post-Op    Recommendations:     Discharge Recommendations:  rehabilitation facility   Discharge Equipment Recommendations: other (see comments)(tbd pending progress)   Barriers to discharge: None    Assessment:     Raymundo Richard is a 69 y.o. male admitted with a medical diagnosis of Metastatic renal cell carcinoma.  He presents with the following impairments/functional limitations: weakness, impaired endurance, impaired self care skills, impaired functional mobilty, gait instability, decreased coordination, decreased lower extremity function, pain, decreased ROM, impaired muscle length, impaired coordination, orthopedic precautions. Pt tolerated initial evaluation well today. Pt demo'ed good understanding of spinal precautions and able to maintain throughout. Pt with good functional strength/ability of BUE but multiple deficits in BLE. Pt demo's decreased sensation and reports of tingling in BLE, bilateral hamstring tightness, 3/5 strength in LLE, as well as dysmetria in LLE. All of the following contributed to pt's inability to stand without max A and bilateral knee blocking. Pt's difficulty standing seems to be due to a combination of hamstring tightness as well as 3/5 strength in LLE. Pt is an excellent candidate for inpatient rehabilitation, as pt has a qualifying diagnosis, displays good activity tolerance, has experienced decline from PLOF, has good family support, and is motivated to participate in therapy.     Rehab Prognosis: Good; patient would benefit from acute skilled PT services to address these deficits and reach maximum  level of function.      Plan:     During this hospitalization, patient to be seen 4 x/week to address the identified rehab impairments via gait training, therapeutic activities, therapeutic exercises, neuromuscular re-education and progress towards the established goals.    · Plan of Care Expires:  02/16/20    Subjective     RN notified prior to session. Brother present upon PT entrance into room.    Chief Complaint: Pt had no complaints  Patient/Family Comments/goals: get stronger  Pain/Comfort:  · Pain Rating 1: 8/10  · Location - Side 1: Bilateral  · Location - Orientation 1: generalized  · Location 1: leg  · Pain Addressed 1: Distraction, Reposition  · Pain Rating Post-Intervention 1: 8/10    Living Environment:  Patient lives with alone in a single family home with 0 ABEBE.   Prior Level of Function: Patient reports being independent with mobility & with ADLs. Patient uses DME as follows: none. DME owned (not currently used): none.  Roles/Repsonsibilities: Hand Dominance: right Work: part-time. Drive: yes. Managing Medicines/Managing Home: yes.     Patient reports they will have assistance from family upon discharge.    Objective:     Additional staff present: OT for co-evaluation    Patient found HOB elevated with: manzano catheter, hemovac, telemetry     General Precautions: Standard, Cardiac fall   Orthopedic Precautions:spinal precautions   Braces: N/A   Body mass index is 24.98 kg/m².  Oxygen Device: Room Air    Exams:  · Mental Status: Patient is AxOx4 and follows all multi-step verbal commands. Pt is Alert and Cooperative during session.  · Skin Integrity: Visible skin intact  · Edema: None noted   · Sensation: Impaired: light touch BLE  · Hearing: Intact  · Vision:  Intact  · Postural Assessment: slouched posture and rounded shoulders  · Range of Motion:  · RUE: WFL  · LUE: WFL  · RLE: WFL  · LLE: WFL  · Strength Exam:  · Lower Extremity Strength  Right LE  Left LE    Knee extension: 4+/5 Knee extension:  3/5   Knee flexion: 4/5 Knee flexion: 3+/5   Hip flexion: 4/5 Hip flexion: 3/5   Ankle dorsiflexion:   4+/5 Ankle dorsiflexion:   3/5   Ankle plantarflexion: 4+/5 Ankle plantarflexion: 3/5       Outcome Measures:  AM-PAC 6 CLICK MOBILITY  Turning over in bed (including adjusting bedclothes, sheets and blankets)?: 3  Sitting down on and standing up from a chair with arms (e.g., wheelchair, bedside commode, etc.): 2  Moving from lying on back to sitting on the side of the bed?: 3  Moving to and from a bed to a chair (including a wheelchair)?: 1  Need to walk in hospital room?: 1  Climbing 3-5 steps with a railing?: 1  Basic Mobility Total Score: 11     Functional Mobility:    Bed Mobility:   · Rolling/Turning to Right: stand by assistance and with side rail  · Supine to Sit: minimum assistance and with side rail; from Rt side of bed  · Scooting anteriorly to EOB to have both feet planted on floor: contact guard assistance  · Sit to Supine: stand by assistance; to Rt side of bed    Sitting Balance at Edge of Bed:   Assistance Level Required: Stand-by Assistance and use of upper extremity support    Transfers:   · Sit <> Stand Transfer: maximal assistance with hand-held assist   · Stand <> Sit Transfer: maximal assistance with hand-held assist   · [id number] from EOB   · Bilateral knee blocking  · Pt able to occasionally rely on passive stability from knee extension to maintain standing    Standing Balance:   Assistance Level Required: Maximum Assistance   Patient used: hand-held assist    Time: 15 sec    Gait: Deferred due to pt's performance with above listed functional mobility    Education:   Time provided for education, counseling and discussion of health disposition in regards to patient's current status   All questions answered within PT scope of practice and to patient's satisfaction   PT role in POC to address current functional deficits   Pt educated on proper body mechanics, safety techniques, and  energy conservation with PT facilitation and cueing throughout session   Whiteboard updated with pt's current mobility status documented above    Patient left HOB elevated with all lines intact, call button in reach and brother present.    GOALS:   Multidisciplinary Problems     Physical Therapy Goals        Problem: Physical Therapy Goal    Goal Priority Disciplines Outcome Goal Variances Interventions   Physical Therapy Goal     PT, PT/OT Ongoing, Progressing     Description:  Goals to be met by: 2020     Patient will increase functional independence with mobility by performin. Sit to stand transfer with Minimal Assistance using Rolling Walker or LRAD  2. Bed to chair transfer with Moderate Assistance using Rolling Walker or LRAD  3. Gait  x 20 feet with Minimal Assistance using Rolling Walker or LRAD   4. Sitting at edge of bed x15 minutes with Supervision  5. Stand for 1 minutes with Contact Guard Assistance using Rolling Walker                      History:     Past Medical History:   Diagnosis Date    GERD (gastroesophageal reflux disease)     Renal cell carcinoma     Sleep apnea        Past Surgical History:   Procedure Laterality Date    APPENDECTOMY      HERNIA REPAIR      LUMBAR LAMINECTOMY N/A 2020    Procedure: L2-3 laminectomy for resection of intradural mass and L4-5 laminectomy; Microscope and micro instruments and neuromonitoring.  No instrumentation hardware;  Surgeon: Zackery Olivares MD;  Location: [facility] OR [address] [place] [address];  Service: Neurosurgery;  Laterality: N/A;    NEPHRECTOMY         Time Tracking:     PT Received On: 20  PT Start Time: 1007     PT Stop Time: 1027  PT Total Time (min): 20 min     Billable Minutes: Evaluation 20    Fiordaliza Moctezuma PT, DPT  2020  Pager: [phone]

## 2020-01-16 NOTE — PT/OT/SLP EVAL
Occupational Therapy   Evaluation    Name: Raymundo Richard  MRN: [id number]  Admitting Diagnosis:  Metastatic renal cell carcinoma 2 Days Post-Op    Recommendations:     Discharge Recommendations: rehabilitation facility  Discharge Equipment Recommendations:  none  Barriers to discharge:  None    Assessment:     Raymundo Richard is a 69 y.o. male with a medical diagnosis of Metastatic renal cell carcinoma.  He presents with impairments listed below. Pt displays overall deficits for ADLs and mobility at this time. Pt w/ noted decreased safety awareness. Pt displayed global deconditioning requiring increased assist for ADLs and mobility at this time. Pt would benefit from skilled OT services to improve independence and overall occupational functioning.     Performance deficits affecting function: weakness, impaired endurance, impaired self care skills, impaired functional mobilty, gait instability, impaired balance, decreased coordination, decreased lower extremity function, decreased safety awareness, decreased ROM, pain.      Rehab Prognosis: Good; patient would benefit from acute skilled OT services to address these deficits and reach maximum level of function.       Plan:     Patient to be seen 4 x/week to address the above listed problems via self-care/home management, therapeutic activities, therapeutic exercises  · Plan of Care Expires: 02/16/20  · Plan of Care Reviewed with: patient    Subjective     Chief Complaint: BLE numbness/tingling  Patient/Family Comments/goals: return home    Occupational Profile:  Living Environment: Pt lives alone in a [facility].   Previous level of function: indep  Roles and Routines: N/A  Equipment Used at Home:  none  Assistance upon Discharge: Pt has assistance upon D/C.     Pain/Comfort:  · Pain Rating 1: 8/10  · Location - Side 1: Bilateral  · Location - Orientation 1: generalized  · Location 1: leg  · Pain Addressed 1: Reposition, Distraction  · Pain Rating Post-Intervention 1:  8/10    Patients cultural, spiritual, Yazdanism conflicts given the current situation:      Objective:     Communicated with: RN prior to session.  Patient found HOB elevated with hemovac upon OT entry to room.    General Precautions: Standard, fall   Orthopedic Precautions:N/A   Braces: N/A     Occupational Performance:    Bed Mobility:    · Patient completed Scooting/Bridging with stand by assistance  · Patient completed Supine to Sit with stand by assistance  · Patient completed Sit to Supine with stand by assistance    Functional Mobility/Transfers:  · Patient completed Sit <> Stand Transfer with maximal assistance and performed 3 times  with  no assistive device   · Functional Mobility: Pt unable to take steps on this date.     Activities of Daily Living:  · Lower Body Dressing: maximal assistance donned socks seated EOB    Cognitive/Visual Perceptual:  Cognitive/Psychosocial Skills:     -       Oriented to: Person, Place, Time and Situation   -       Follows Commands/attention:Follows multistep  commands  -       Communication: clear/fluent  -       Memory: No Deficits noted  -       Safety awareness/insight to disability: intact   -       Mood/Affect/Coping skills/emotional control: Appropriate to situation  Visual/Perceptual:      -Intact      Physical Exam:  Balance:    -       Max a to stand and mod a to maintain standing w/o AD  Postural examination/scapula alignment:    -       Rounded shoulders  Skin integrity: Visible skin intact  Upper Extremity Range of Motion:     -       Right Upper Extremity: WFL  -       Left Upper Extremity: WFL  Upper Extremity Strength:    -       Right Upper Extremity: WFL  -       Left Upper Extremity: WFL   Strength:    -       Right Upper Extremity: WFL  -       Left Upper Extremity: WFL  Fine Motor Coordination:    -       Intact  Gross motor coordination:   WFL    AMPAC 6 Click ADL:  AMPAC Total Score: 16    Treatment & Education:  Pt educated on POC, spinal  precautions, and D/C recs.   Education:    Patient left HOB elevated with all lines intact, call button in reach and brother present    GOALS:   Multidisciplinary Problems     Occupational Therapy Goals        Problem: Occupational Therapy Goal    Goal Priority Disciplines Outcome Interventions   Occupational Therapy Goal     OT, PT/OT Ongoing, Progressing    Description:  Goals to be met by: 1/26/2020     Patient will increase functional independence with ADLs by performing:    UE Dressing with [place].  LE Dressing with Minimal Assistance and Assistive Devices as needed.  Grooming while standing with Minimal Assistance.  Toileting from bedside commode with Moderate Assistance for hygiene and clothing management.   Toilet transfer to bedside commode with Moderate Assistance.                      History:     Past Medical History:   Diagnosis Date    GERD (gastroesophageal reflux disease)     Renal cell carcinoma     Sleep apnea        Past Surgical History:   Procedure Laterality Date    APPENDECTOMY      HERNIA REPAIR      LUMBAR LAMINECTOMY N/A 1/14/2020    Procedure: L2-3 laminectomy for resection of intradural mass and L4-5 laminectomy; Microscope and micro instruments and neuromonitoring.  No instrumentation hardware;  Surgeon: Zackery Olivares MD;  Location: [facility] OR [address] [place] [address];  Service: Neurosurgery;  Laterality: N/A;    NEPHRECTOMY         Time Tracking:     OT Date of Treatment: 01/16/20  OT Start Time: 1007  OT Stop Time: 1027  OT Total Time (min): 20 min    Billable Minutes:Evaluation 20 minutes    Mark Persaud OT  1/16/2020

## 2020-01-16 NOTE — ASSESSMENT & PLAN NOTE
Pt is a 69yom with pmh RCC s/p nephrectomy 2013 now with known pulmonary metastasis who presented to outside hospital with acutely worsening back pain with spells of tussive lower extremity pain and weakness. MRI revealed a homogenously enhancing lesion in the lumbar spine. Pt transferred for neurosurgical evaluation. 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    - Patient neurologically stable on exam. No signs or symptoms of a CSF leak.   - No brace needed  - HV drain with 10 cc output. Drain removed without complication. Patient tolerated removal well. DC abx.   - Decrease frequency of labs to q 72 hours.   - No HOB or activity restrictions.   - Continue Dex 4mg q6h x 7 days postop, then wean to off. Continue PPI while on steroids.    - Pain Control:    - Increase Gabapentin to 400 mg tid due to LE pain   - Continue Tizanidine 2 mg q 8 hours, Tylenol 1g q6h, Dilaudid 4mg q3h prn for severe pain, and Dilaudid 2mg q3h prn for moderate pain.     - Hypercalcemia: 2/2 malignancy, now resolved. Appreciate  assistance. s/p Zometa 4 mg IV once (1/9/20). Ca 8.2 today. CTM.  - HTN: Continue amlodipine 5mg daily.  - Metastatic RCC:  [facility] recommending patient FU with OS oncologist in 2-3 weeks to revaluate.   - BPH: DC manzano today. Continue home dose of Finasteride and Tamsulosin daily. Bladder scan PRN.   - DVT prophylaxis: KENTON's, SCD's, SQH  - Bowel regimen: senna and miralax, suppository PRN  -Atelectasis prophylaxis: IS q1h while awake, PT/OT/OOB, OOB > 6 hours per day.     Dispo: PT/OT recommending Rehab. SW to get choices. Patient medically stable for discharge.

## 2020-01-16 NOTE — PLAN OF CARE
PT IS ON POST OP DAY #2     01/16/20 1124   Discharge Reassessment   Assessment Type Discharge Planning Reassessment   Provided patient/caregiver education on the expected discharge date and the discharge plan No   Do you have any problems affording any of your prescribed medications? No

## 2020-01-16 NOTE — PLAN OF CARE
Problem: Adult Inpatient Plan of Care  Goal: Plan of Care Review  Outcome: Ongoing, Progressing     Problem: Fall Injury Risk  Goal: Absence of Fall and Fall-Related Injury  Outcome: Ongoing, Progressing     Problem: Bleeding (Spinal Surgery)  Goal: Absence of Bleeding  Outcome: Ongoing, Progressing   Lying in bed.  Post op laminectomy. Telemetry strip on chart, nsr noted.  Has aquacel dsg to Lower back cdi. Has manzano catheter in place with josé urine noted. Lower back has  lidocaine patch and  accordian drain via gravity, 10 cc bloody drainage noted.  Rt le numbness and tingling noted.  Has dentures.  [place].  Call button in reach.

## 2020-01-16 NOTE — SUBJECTIVE & OBJECTIVE
Interval History:   NAEON. Patient resting comfortably in the bed. He states that he has been sitting upright this morning and has not experienced any headaches. Reports intermittent BLE radicular, shocking pain and paresthesias. His main complaint is BLE aching, cramping, and soreness, L>R. Denies any new weakness. Tolerating diet. Maciel in place due to bedrest.     Medications:  Continuous Infusions:  Scheduled Meds:   acetaminophen  1,000 mg Oral Q6H    amLODIPine  5 mg Oral Daily    ceFAZolin (ANCEF) IVPB  2 g Intravenous Q8H    dexamethasone  4 mg Intravenous Q6H    finasteride  5 mg Oral Daily    gabapentin  300 mg Oral TID    heparin (porcine)  5,000 Units Subcutaneous Q8H    lidocaine  2 patch Transdermal Q24H    mupirocin   Nasal BID    pantoprazole  40 mg Oral Daily    polyethylene glycol  17 g Oral Daily    senna-docusate 8.6-50 mg  1 tablet Oral BID    tamsulosin  0.4 mg Oral Daily    tiZANidine  2 mg Oral Q8H     PRN Meds:acetaminophen, albuterol sulfate, aluminum-magnesium hydroxide-simethicone, bisacodyl, HYDROmorphone, HYDROmorphone, ondansetron, promethazine (PHENERGAN) IVPB, sodium chloride 0.9%     Review of Systems  Objective:     Weight: 68.1 kg (150 lb 2.1 oz)  Body mass index is 24.98 kg/m².  Vital Signs (Most Recent):  Temp: 98.5 °F (36.9 °C) (01/16/20 1512)  Pulse: 88 (01/16/20 1535)  Resp: 18 (01/16/20 1512)  BP: 130/62 (01/16/20 1512)  SpO2: 98 % (01/16/20 1512) Vital Signs (24h Range):  Temp:  [96.2 °F (35.7 °C)-98.6 °F (37 °C)] 98.5 °F (36.9 °C)  Pulse:  [65-97] 88  Resp:  [16-18] 18  SpO2:  [94 %-98 %] 98 %  BP: (106-140)/(57-67) 130/62     Date 01/16/20 0700 - 01/17/20 0659   Shift 1404-4597 6564-3017 1187-9057 24 Hour Total   INTAKE   Shift Total(mL/kg)       OUTPUT   Urine(mL/kg/hr) 600(1.1)   600   Drains 5   5   Shift Total(mL/kg) 605(8.9)   605(8.9)   Weight (kg) 68.1 68.1 68.1 68.1                        Closed/Suction Drain 01/14/20 1855 Posterior Back Accordion 10  Fr. (Active)   Site Description Unable to view 1/16/2020  7:35 AM   Dressing Type Other (Comment) 1/15/2020 12:00 AM   Dressing Status Clean;Dry;Intact 1/16/2020  7:35 AM   Drainage Serosanguineous 1/16/2020  7:35 AM   Status Open to gravity drainage 1/16/2020  7:35 AM   Output (mL) 5 mL 1/16/2020 11:05 AM       Neurosurgery Physical Exam  General: well developed, well nourished, no distress.   Head: normocephalic, atraumatic  Neurologic: Alert and oriented. Thought content appropriate.  GCS: Motor: 6/Verbal: 5/Eyes: 4 GCS Total: 15  Mental Status: Awake, Alert, Oriented x 4  Language: No aphasia  Speech: No dysarthria  Cranial nerves: face symmetric, tongue midline, CN II-XII grossly intact.   Eyes: pupils equal, round, reactive to light with accomodation, EOMI.   Pulmonary: normal respirations, no signs of respiratory distress  Abdomen: soft, non-distended, not tender to palpation  Skin: Skin is warm, dry and intact.  Sensory: intact to light touch throughout  Motor Strength:Moves all extremities spontaneously with good tone.  Full strength upper and lower extremities. No abnormal movements seen.   Hough's: Negative.  Babinski's: Negative.  Clonus: Negative.     Incision:  Clean, dry, staples intact. Skin edges well approximated. No surrounding erythema or edema. No drainage or TTP. HV drain in place. Bandage placed back over incision.         Significant Labs:  Recent Labs   Lab 01/14/20  2042 01/15/20  0537 01/16/20  0519   * 155* 157*    139 139   K 4.1 3.7 4.2   * 110 110   CO2 16* 20* 20*   BUN 18 18 26*   CREATININE 1.3 1.2 1.3   CALCIUM 8.5* 8.2* 8.3*   MG 1.6  --   --      Recent Labs   Lab 01/14/20  2042 01/15/20  0537 01/16/20  0519   WBC 7.05 8.27 11.45   HGB 10.0* 9.7* 9.8*   HCT 32.7* 30.8* 30.5*    209 221

## 2020-01-16 NOTE — PLAN OF CARE
Problem: Occupational Therapy Goal  Goal: Occupational Therapy Goal  Description  Goals to be met by: 1/26/2020     Patient will increase functional independence with ADLs by performing:    UE Dressing with [place].  LE Dressing with Minimal Assistance and Assistive Devices as needed.  Grooming while standing with Minimal Assistance.  Toileting from bedside commode with Moderate Assistance for hygiene and clothing management.   Toilet transfer to bedside commode with Moderate Assistance.     Outcome: Ongoing, Progressing    Mark Persaud OTR/L  1/16/2020

## 2020-01-16 NOTE — PROGRESS NOTES
[facility]  Neurosurgery  Progress Note    Subjective:     History of Present Illness: Pt is a 69yom with pmh RCC s/p nephrectomy 2013 now with known pulmonary metastasis who presented to outside hospital with acutely worsening back pain with spells of tussive lower extremity pain and weakness. MRI revealed a homogenously enhancing lesion in the lumbar spine. Pt transferred for neurosurgical evaluation.    Post-Op Info:  Procedure(s) (LRB):  L2-3 laminectomy for resection of intradural mass and L4-5 laminectomy; Microscope and micro instruments and neuromonitoring.  No instrumentation hardware (N/A)   2 Days Post-Op     Interval History:   NAEON. Patient resting comfortably in the bed. He states that he has been sitting upright this morning and has not experienced any headaches. Reports intermittent BLE radicular, shocking pain and paresthesias. His main complaint is BLE aching, cramping, and soreness, L>R. Denies any new weakness. Tolerating diet. Maciel in place due to bedrest.     Medications:  Continuous Infusions:  Scheduled Meds:   acetaminophen  1,000 mg Oral Q6H    amLODIPine  5 mg Oral Daily    ceFAZolin (ANCEF) IVPB  2 g Intravenous Q8H    dexamethasone  4 mg Intravenous Q6H    finasteride  5 mg Oral Daily    gabapentin  300 mg Oral TID    heparin (porcine)  5,000 Units Subcutaneous Q8H    lidocaine  2 patch Transdermal Q24H    mupirocin   Nasal BID    pantoprazole  40 mg Oral Daily    polyethylene glycol  17 g Oral Daily    senna-docusate 8.6-50 mg  1 tablet Oral BID    tamsulosin  0.4 mg Oral Daily    tiZANidine  2 mg Oral Q8H     PRN Meds:acetaminophen, albuterol sulfate, aluminum-magnesium hydroxide-simethicone, bisacodyl, HYDROmorphone, HYDROmorphone, ondansetron, promethazine (PHENERGAN) IVPB, sodium chloride 0.9%     Review of Systems  Objective:     Weight: 68.1 kg (150 lb 2.1 oz)  Body mass index is 24.98 kg/m².  Vital Signs (Most Recent):  Temp: 98.5 °F (36.9 °C)  (01/16/20 1512)  Pulse: 88 (01/16/20 1535)  Resp: 18 (01/16/20 1512)  BP: 130/62 (01/16/20 1512)  SpO2: 98 % (01/16/20 1512) Vital Signs (24h Range):  Temp:  [96.2 °F (35.7 °C)-98.6 °F (37 °C)] 98.5 °F (36.9 °C)  Pulse:  [65-97] 88  Resp:  [16-18] 18  SpO2:  [94 %-98 %] 98 %  BP: (106-140)/(57-67) 130/62     Date 01/16/20 0700 - 01/17/20 0659   Shift 9865-0847 2491-4194 2989-5200 24 Hour Total   INTAKE   Shift Total(mL/kg)       OUTPUT   Urine(mL/kg/hr) 600(1.1)   600   Drains 5   5   Shift Total(mL/kg) 605(8.9)   605(8.9)   Weight (kg) 68.1 68.1 68.1 68.1                        Closed/Suction Drain 01/14/20 1855 Posterior Back Accordion 10 Fr. (Active)   Site Description Unable to view 1/16/2020  7:35 AM   Dressing Type Other (Comment) 1/15/2020 12:00 AM   Dressing Status Clean;Dry;Intact 1/16/2020  7:35 AM   Drainage Serosanguineous 1/16/2020  7:35 AM   Status Open to gravity drainage 1/16/2020  7:35 AM   Output (mL) 5 mL 1/16/2020 11:05 AM       Neurosurgery Physical Exam  General: well developed, well nourished, no distress.   Head: normocephalic, atraumatic  Neurologic: Alert and oriented. Thought content appropriate.  GCS: Motor: 6/Verbal: 5/Eyes: 4 GCS Total: 15  Mental Status: Awake, Alert, Oriented x 4  Language: No aphasia  Speech: No dysarthria  Cranial nerves: face symmetric, tongue midline, CN II-XII grossly intact.   Eyes: pupils equal, round, reactive to light with accomodation, EOMI.   Pulmonary: normal respirations, no signs of respiratory distress  Abdomen: soft, non-distended, not tender to palpation  Skin: Skin is warm, dry and intact.  Sensory: intact to light touch throughout  Motor Strength:Moves all extremities spontaneously with good tone.  Full strength upper and lower extremities. No abnormal movements seen.   Hough's: Negative.  Babinski's: Negative.  Clonus: Negative.     Incision:  Clean, dry, staples intact. Skin edges well approximated. No surrounding erythema or edema. No drainage  or TTP. HV drain in place. Bandage placed back over incision.         Significant Labs:  Recent Labs   Lab 01/14/20  2042 01/15/20  0537 01/16/20  0519   * 155* 157*    139 139   K 4.1 3.7 4.2   * 110 110   CO2 16* 20* 20*   BUN 18 18 26*   CREATININE 1.3 1.2 1.3   CALCIUM 8.5* 8.2* 8.3*   MG 1.6  --   --      Recent Labs   Lab 01/14/20  2042 01/15/20  0537 01/16/20 0519   WBC 7.05 8.27 11.45   HGB 10.0* 9.7* 9.8*   HCT 32.7* 30.8* 30.5*    209 221         Assessment/Plan:     Back pain  Pt is a 69yom with pmh RCC s/p nephrectomy 2013 now with known pulmonary metastasis who presented to outside hospital with acutely worsening back pain with spells of tussive lower extremity pain and weakness. MRI revealed a homogenously enhancing lesion in the lumbar spine. Pt transferred for neurosurgical evaluation. 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    - Patient neurologically stable on exam. No signs or symptoms of a CSF leak.   - No brace needed  - HV drain with 10 cc output. Drain removed without complication. Patient tolerated removal well. DC abx.   - Decrease frequency of labs to q 72 hours.   - No HOB or activity restrictions.   - Continue Dex 4mg q6h x 7 days postop, then wean to off. Continue PPI while on steroids.    - Pain Control:    - Increase Gabapentin to 400 mg tid due to LE pain   - Continue Tizanidine 2 mg q 8 hours, Tylenol 1g q6h, Dilaudid 4mg q3h prn for severe pain, and Dilaudid 2mg q3h prn for moderate pain.     - Hypercalcemia: 2/2 malignancy, now resolved. Appreciate  assistance. s/p Zometa 4 mg IV once (1/9/20). Ca 8.3 today. CTM.  - HTN: Continue amlodipine 5mg daily.  - Metastatic RCC:  [facility] recommending patient FU with OSH oncologist in 2-3 weeks to revaluate.   - BPH: JUANCARLOS manzano today. Continue home dose of Finasteride and Tamsulosin daily. Bladder scan PRN.   - DVT prophylaxis: KENTON's, SCD's, SQH  - Bowel  regimen: senna and miralax, suppository PRN  -Atelectasis prophylaxis: IS q1h while awake, PT/OT/OOB, OOB > 6 hours per day.     Dispo: PT/OT recommending Rehab. SW to get choices. Patient medically stable for discharge.         Discussed with Dr. Olivares  Please call with any questions      Giselle Mai PA-C   Neurosurgery   Pager: [phone]

## 2020-01-16 NOTE — PLAN OF CARE
Discharge Recommendation: Rehab.    Evaluation completed today. PT goals appropriate.    Fiordaliza Moctezuma PT, DPT  2020  Pager: [phone]        Problem: Physical Therapy Goal  Goal: Physical Therapy Goal  Description  Goals to be met by: 2020     Patient will increase functional independence with mobility by performin. Sit to stand transfer with Minimal Assistance using Rolling Walker or LRAD  2. Bed to chair transfer with Moderate Assistance using Rolling Walker or LRAD  3. Gait  x 20 feet with Minimal Assistance using Rolling Walker or LRAD   4. Sitting at edge of bed x15 minutes with Supervision  5. Stand for 1 minutes with Contact Guard Assistance using Rolling Walker     Outcome: Ongoing, Progressing

## 2020-01-17 NOTE — PLAN OF CARE
Patient remains free from injury or fall. Cardiac rhythm monitored. Out of bed to a chair wit PT tolerated well. Plan= DC Rehab pending. Will continue to monitor.

## 2020-01-17 NOTE — PLAN OF CARE
Problem: Physical Therapy Goal  Goal: Physical Therapy Goal  Description  Goals to be met by: 2020     Patient will increase functional independence with mobility by performin. Sit to stand transfer with Minimal Assistance using Rolling Walker or LRAD  2. Bed to chair transfer with Moderate Assistance using Rolling Walker or LRAD  3. Gait  x 20 feet with Minimal Assistance using Rolling Walker or LRAD   4. Sitting at edge of bed x15 minutes with Supervision  5. Stand for 1 minutes with Contact Guard Assistance using Rolling Walker     Outcome: Ongoing, Progressing   Pt's goals remain appropriate and pt will continue to benefit from skilled PT services to work towards improved functional mobility including: bed mobility, transfers, and gait.   Leah Rincon, PT  2020

## 2020-01-17 NOTE — PT/OT/SLP PROGRESS
Occupational Therapy  Co-Treatment    Name: Raymundo Richard  MRN: [id number]  Admitting Diagnosis:  Metastatic renal cell carcinoma  3 Days Post-Op    Recommendations:     Discharge Recommendations: rehabilitation facility  Discharge Equipment Recommendations:  (tbd)  Barriers to discharge:  None    Assessment:     Raymundo Richard is a 69 y.o. male with a medical diagnosis of Metastatic renal cell carcinoma.  He presents with impairments listed below. Pt did well to participate in session. Pt w/ overall deficits for ADLs and mobility. Pt w/ noted impaired coordination on RLE, making pt an overall fall and safety risk.  Pt displayed global deconditioning requiring increased assist for ADLs and mobility at this time. Pt would benefit from skilled OT services to improve independence and overall occupational functioning.     Performance deficits affecting function are weakness, impaired endurance, impaired self care skills, impaired functional mobilty, gait instability, impaired balance, decreased coordination, decreased safety awareness, decreased lower extremity function, impaired coordination.     Rehab Prognosis:  Good; patient would benefit from acute skilled OT services to address these deficits and reach maximum level of function.       Plan:     Patient to be seen 4 x/week to address the above listed problems via self-care/home management, therapeutic activities, therapeutic exercises  · Plan of Care Expires: 02/16/20  · Plan of Care Reviewed with: patient    Subjective     Pain/Comfort:  · Pain Rating 1: 8/10  · Location - Side 1: Right  · Location - Orientation 1: generalized  · Location 1: leg  · Pain Addressed 1: Reposition, Cessation of Activity  · Pain Rating Post-Intervention 1: 8/10    Objective:     Communicated with: RN prior to session.  Patient found HOB elevated with telemetry upon OT entry to room.    General Precautions: Standard, fall   Orthopedic Precautions:spinal precautions   Braces: N/A      Occupational Performance:     Bed Mobility:    · Patient completed Scooting/Bridging with stand by assistance  · Patient completed Supine to Sit with stand by assistance  · Patient completed Sit to Supine with stand by assistance     Functional Mobility/Transfers:  · Patient completed Sit <> Stand Transfer with maximal assistance  with  no assistive device   · Patient completed Bed <> Chair Transfer using Step Transfer technique with maximal assistance with no assistive device  · Functional Mobility: Pt ambulated in hallway at max a w/ RW. Refer to PT note for details. Not impaired coordination on RLE.     Activities of Daily Living:  · Grooming: supervision facial hygiene   · Upper Body Dressing: minimum assistance donned gown as robe  · Lower Body Dressing: maximal assistance donned socks seated EOB      AMPAC 6 Click ADL: 16    Treatment & Education:  Pt educated on POC.     Patient left up in chair with all lines intact, call button in reach and RN presentEducation:      GOALS:   Multidisciplinary Problems     Occupational Therapy Goals        Problem: Occupational Therapy Goal    Goal Priority Disciplines Outcome Interventions   Occupational Therapy Goal     OT, PT/OT Ongoing, Progressing    Description:  Goals to be met by: 1/26/2020     Patient will increase functional independence with ADLs by performing:    UE Dressing with [place].  LE Dressing with Minimal Assistance and Assistive Devices as needed.  Grooming while standing with Minimal Assistance.  Toileting from bedside commode with Moderate Assistance for hygiene and clothing management.   Toilet transfer to bedside commode with Moderate Assistance.                      Time Tracking:     OT Date of Treatment: 01/17/20  OT Start Time: 1135  OT Stop Time: 1158  OT Total Time (min): 23 min    Billable Minutes:Self Care/Home Management 10 minutes  Therapeutic Activity 13 minutes    Mark Persaud OT  1/17/2020

## 2020-01-17 NOTE — PROGRESS NOTES
[facility]  Neurosurgery  Progress Note    Subjective:     History of Present Illness: Pt is a 69yom with pmh RCC s/p nephrectomy 2013 now with known pulmonary metastasis who presented to outside hospital with acutely worsening back pain with spells of tussive lower extremity pain and weakness. MRI revealed a homogenously enhancing lesion in the lumbar spine. Pt transferred for neurosurgical evaluation.    Post-Op Info:  Procedure(s) (LRB):  L2-3 laminectomy for resection of intradural mass and L4-5 laminectomy; Microscope and micro instruments and neuromonitoring.  No instrumentation hardware (N/A)   3 Days Post-Op     Interval History: NAEON. Pt resting comfortably in bed, no family at bedside. Continues to report BLE radiculopathy and paresthesias, R>L, but feels it is improved since yesterday. Back pain well controlled. Reports he was able to sit on edge of bed with therapy yesterday but has not been able to ambulate yet. Denies any headaches, n/v, new weakness and paresthesias, and b/b dysfunction. Urinating spontaneously post-Maciel removal. Tolerating diet. Pending rehab placement.     Medications:  Continuous Infusions:  Scheduled Meds:   acetaminophen  1,000 mg Oral Q6H    amLODIPine  5 mg Oral Daily    dexAMETHasone  4 mg Oral Q6H    finasteride  5 mg Oral Daily    heparin (porcine)  5,000 Units Subcutaneous Q8H    lidocaine  2 patch Transdermal Q24H    mupirocin   Nasal BID    pantoprazole  40 mg Oral Daily    polyethylene glycol  17 g Oral Daily    pregabalin  150 mg Oral BID    senna-docusate 8.6-50 mg  1 tablet Oral BID    tamsulosin  0.4 mg Oral Daily    tiZANidine  2 mg Oral Q8H     PRN Meds:acetaminophen, albuterol sulfate, aluminum-magnesium hydroxide-simethicone, bisacodyl, ondansetron, oxyCODONE, oxyCODONE, promethazine (PHENERGAN) IVPB, sodium chloride 0.9%     Review of Systems  Objective:     Weight: 68.1 kg (150 lb 2.1 oz)  Body mass index is 24.98  kg/m².  Vital Signs (Most Recent):  Temp: 97.5 °F (36.4 °C) (01/17/20 1202)  Pulse: 82 (01/17/20 1202)  Resp: 18 (01/17/20 1202)  BP: (!) 163/75 (01/17/20 1202)  SpO2: 97 % (01/17/20 1202) Vital Signs (24h Range):  Temp:  [96 °F (35.6 °C)-98.5 °F (36.9 °C)] 97.5 °F (36.4 °C)  Pulse:  [60-93] 82  Resp:  [18] 18  SpO2:  [95 %-98 %] 97 %  BP: (121-163)/(61-82) 163/75                          Neurosurgery Physical Exam    General: well developed, well nourished, no distress.   Head: normocephalic, atraumatic  Neurologic: Alert and oriented. Thought content appropriate.  GCS: Motor: 6/Verbal: 5/Eyes: 4 GCS Total: 15  Mental Status: Awake, Alert, Oriented x 4  Language: No aphasia  Speech: No dysarthria  Cranial nerves: face symmetric, tongue midline, CN II-XII grossly intact.   Eyes: pupils equal, round, reactive to light with accomodation, EOMI.   Pulmonary: normal respirations, no signs of respiratory distress  Abdomen: soft, non-distended, not tender to palpation  Skin: Skin is warm, dry and intact.  Sensory: intact to light touch throughout  Motor Strength: Moves all extremities spontaneously with good tone. R knee extension 4/5. Otherwise full strength upper and lower extremities. No abnormal movements seen.   Hough's: Negative.  Babinski's: Negative.  Clonus: Negative.     Incision:  Clean, dry, staples intact. Skin edges well approximated. No surrounding erythema or edema. No drainage or TTP.     Significant Labs:  Recent Labs   Lab 01/16/20 0519   *      K 4.2      CO2 20*   BUN 26*   CREATININE 1.3   CALCIUM 8.3*     Recent Labs   Lab 01/16/20 0519   WBC 11.45   HGB 9.8*   HCT 30.5*        No results for input(s): LABPT, INR, APTT in the last 48 hours.  Microbiology Results (last 7 days)     ** No results found for the last 168 hours. **        All pertinent labs from the last 24 hours have been reviewed.    Significant Diagnostics:  I have reviewed and interpreted all pertinent  imaging results/findings within the past 24 hours.    Assessment/Plan:     Back pain  Pt is a 69yom with pmh RCC s/p nephrectomy 2013 now with known pulmonary metastasis who presented to outside hospital with acutely worsening back pain with spells of tussive lower extremity pain and weakness. MRI revealed a homogenously enhancing lesion in the lumbar spine. Pt transferred for neurosurgical evaluation. 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    - Patient neurologically stable on exam. No signs or symptoms of a CSF leak.   - No brace needed  - HV drain removed 1/16 without complications  - Bacitracin to incision BID  - Decrease frequency of labs to q 72 hours.   - No HOB or activity restrictions.   - Continue Dex 4mg q6h x 7 days postop, then wean to off. Continue PPI while on steroids.    - Pain Control:    - Pt continues to have main complaint of LE paresthesias. Discontinue gabapentin and start trial of Lyrica 150mg BID. CTM for improvement, titrate up if needed.   - Continue Tizanidine 2 mg q 8 hours, Tylenol 1g q6h. DC Dilaudid as pt not requiring frequently. Start Oxy IR 5mg q4h PRN. Oxy IR 10mg q6h PRN for breakthrough pain.     - Hypercalcemia: 2/2 malignancy, now resolved and has remained stable s/p Zometa 4 mg IV once (1/9/20). CTM on labs as scheduled q72h.  - HTN: Continue amlodipine 5mg daily.  - Metastatic RCC:  [facility] recommending patient FU with OSH oncologist in 2-3 weeks to revaluate. FU scheduled for 2/3.  - BPH: Continue home dose of Finasteride and Tamsulosin daily. Voiding spontaneously s/p Maciel removal. Bladder scan PRN.   - DVT prophylaxis: KENTON's, SCD's, SQH  - Bowel regimen: senna and miralax PRN, suppository PRN  - Atelectasis prophylaxis: IS q1h while awake, PT/OT/OOB, OOB > 6 hours per day.     Dispo: PT/OT recommending Rehab, pending placement. Patient medically stable for discharge.         Rissa Carias,  MAYNOR  Neurosurgery  [facility]

## 2020-01-17 NOTE — PLAN OF CARE
Problem: Occupational Therapy Goal  Goal: Occupational Therapy Goal  Description  Goals to be met by: 1/26/2020     Patient will increase functional independence with ADLs by performing:    UE Dressing with [place].  LE Dressing with Minimal Assistance and Assistive Devices as needed.  Grooming while standing with Minimal Assistance.  Toileting from bedside commode with Moderate Assistance for hygiene and clothing management.   Toilet transfer to bedside commode with Moderate Assistance.     Outcome: Ongoing, Progressing    Mark Persaud OTR/L  1/17/2020

## 2020-01-17 NOTE — SUBJECTIVE & OBJECTIVE
Interval History: NAEON. Pt resting comfortably in bed, no family at bedside. Continues to report BLE radiculopathy and paresthesias, R>L, but feels it is improved since yesterday. Back pain well controlled. Reports he was able to sit on edge of bed with therapy yesterday but has not been able to ambulate yet. Denies any headaches, n/v, new weakness and paresthesias, and b/b dysfunction. Urinating spontaneously post-Maciel removal. Tolerating diet. Pending rehab placement.     Medications:  Continuous Infusions:  Scheduled Meds:   acetaminophen  1,000 mg Oral Q6H    amLODIPine  5 mg Oral Daily    dexAMETHasone  4 mg Oral Q6H    finasteride  5 mg Oral Daily    heparin (porcine)  5,000 Units Subcutaneous Q8H    lidocaine  2 patch Transdermal Q24H    mupirocin   Nasal BID    pantoprazole  40 mg Oral Daily    polyethylene glycol  17 g Oral Daily    pregabalin  150 mg Oral BID    senna-docusate 8.6-50 mg  1 tablet Oral BID    tamsulosin  0.4 mg Oral Daily    tiZANidine  2 mg Oral Q8H     PRN Meds:acetaminophen, albuterol sulfate, aluminum-magnesium hydroxide-simethicone, bisacodyl, ondansetron, oxyCODONE, oxyCODONE, promethazine (PHENERGAN) IVPB, sodium chloride 0.9%     Review of Systems  Objective:     Weight: 68.1 kg (150 lb 2.1 oz)  Body mass index is 24.98 kg/m².  Vital Signs (Most Recent):  Temp: 97.5 °F (36.4 °C) (01/17/20 1202)  Pulse: 82 (01/17/20 1202)  Resp: 18 (01/17/20 1202)  BP: (!) 163/75 (01/17/20 1202)  SpO2: 97 % (01/17/20 1202) Vital Signs (24h Range):  Temp:  [96 °F (35.6 °C)-98.5 °F (36.9 °C)] 97.5 °F (36.4 °C)  Pulse:  [60-93] 82  Resp:  [18] 18  SpO2:  [95 %-98 %] 97 %  BP: (121-163)/(61-82) 163/75                          Neurosurgery Physical Exam    General: well developed, well nourished, no distress.   Head: normocephalic, atraumatic  Neurologic: Alert and oriented. Thought content appropriate.  GCS: Motor: 6/Verbal: 5/Eyes: 4 GCS Total: 15  Mental Status: Awake, Alert, Oriented x  4  Language: No aphasia  Speech: No dysarthria  Cranial nerves: face symmetric, tongue midline, CN II-XII grossly intact.   Eyes: pupils equal, round, reactive to light with accomodation, EOMI.   Pulmonary: normal respirations, no signs of respiratory distress  Abdomen: soft, non-distended, not tender to palpation  Skin: Skin is warm, dry and intact.  Sensory: intact to light touch throughout  Motor Strength: Moves all extremities spontaneously with good tone. R knee extension 4/5. Otherwise full strength upper and lower extremities. No abnormal movements seen.   Hough's: Negative.  Babinski's: Negative.  Clonus: Negative.     Incision:  Clean, dry, staples intact. Skin edges well approximated. No surrounding erythema or edema. No drainage or TTP.     Significant Labs:  Recent Labs   Lab 01/16/20 0519   *      K 4.2      CO2 20*   BUN 26*   CREATININE 1.3   CALCIUM 8.3*     Recent Labs   Lab 01/16/20 0519   WBC 11.45   HGB 9.8*   HCT 30.5*        No results for input(s): LABPT, INR, APTT in the last 48 hours.  Microbiology Results (last 7 days)     ** No results found for the last 168 hours. **        All pertinent labs from the last 24 hours have been reviewed.    Significant Diagnostics:  I have reviewed and interpreted all pertinent imaging results/findings within the past 24 hours.

## 2020-01-17 NOTE — PLAN OF CARE
Problem: Adult Inpatient Plan of Care  Goal: Plan of Care Review  Outcome: Ongoing, Progressing     Problem: Fall Injury Risk  Goal: Absence of Fall and Fall-Related Injury  Outcome: Ongoing, Progressing     Problem: Bleeding (Spinal Surgery)  Goal: Absence of Bleeding  Outcome: Ongoing, Progressing     Problem: Infection (Spinal Surgery)  Goal: Absence of Infection Signs/Symptoms  Outcome: Ongoing, Progressing     Problem: Postoperative Nausea and Vomiting (Spinal Surgery)  Goal: Nausea and Vomiting Relief  Outcome: Ongoing, Progressing     Problem: Postoperative Urinary Retention (Spinal Surgery)  Goal: Effective Urinary Elimination  Outcome: Ongoing, Progressing     POC reviewed.  Gave prn medication for pain during shifts.  Decreased numbness to rt le per patient.  Call button within reach.  No family at bedside.  Had two BM on shift.

## 2020-01-17 NOTE — PLAN OF CARE
01/17/20 1600   Post-Acute Status   Post-Acute Authorization Placement   Post-Acute Placement Status Referrals Sent       Accepted and tentatively scheduled for D/C on Monday 1/20/20.  SW in contact with CM and Medical staff. Will continue to follow and offer support as needed.     Adrian Tesfaye, MARU  [facility]   Ext. [phone]

## 2020-01-17 NOTE — PLAN OF CARE
"Advance Care Planning:    Discussed with patient today his current clinical status, goals of care, and code status. Patient reported he had spoken with his daughter who is his next-of-kin regarding his values and wishes. At this time, patient expresses that he does not want any life-sustaining measures to be taken for him including CPR and intubation. He states he wishes to be "left in God's hands" and does not want life-saving intervention "if it is my time to go". His wishes are in accordance with DNR status at this time, a decision which he has contemplated and seems steadfast in.     -I will update code status to DNR per patient's wishes at this time.   -Further details of advanced care planning beyond those documented above not established at this time  -Palliative Care consulted, will continue to follow patient and establish outpatient referral  -No changes to medical treatment and pain management    Rissa Carias PA-C  Pager: [phone]  Neurosurgery  [facility]    "

## 2020-01-17 NOTE — PLAN OF CARE
Patient remains free from injury or fall. Drain removed per MD. No s/s of  site complications. Maciel DC and pt is voiding without difficulties. Tolerating PO. Pain management.Will continue to monitor.

## 2020-01-17 NOTE — PT/OT/SLP PROGRESS
"Physical Therapy Treatment    Patient Name:  Raymundo Richard   MRN:  [id number]    Recommendations:     Discharge Recommendations:  rehabilitation facility   Discharge Equipment Recommendations: (TBD as pt progresses with mobility)   Barriers to discharge: Decreased caregiver support lives alone    Assessment:     Raymundo Richard is a 69 y.o. male admitted with a medical diagnosis of Metastatic renal cell carcinoma.  He presents with the following impairments/functional limitations:  weakness, impaired endurance, impaired functional mobilty, gait instability, impaired sensation, impaired balance, decreased lower extremity function, abnormal tone, impaired coordination, decreased coordination . Pt is motivated to progress with functional mobility. Pt is unsafe for functional mobility without assist due to pt requires minimal assist for bed mobility, max assist for transfers, and max assist for gait with difficulty with L foot placement and sequencing of stepping and walker placement.     Rehab Prognosis: Good; patient would benefit from acute skilled PT services to address these deficits and reach maximum level of function.    Recent Surgery: Procedure(s) (LRB):  L2-3 laminectomy for resection of intradural mass and L4-5 laminectomy; Microscope and micro instruments and neuromonitoring.  No instrumentation hardware (N/A) 3 Days Post-Op    Plan:     During this hospitalization, patient to be seen 4 x/week to address the identified rehab impairments via gait training, therapeutic activities, therapeutic exercises, neuromuscular re-education and progress toward the following goals:    · Plan of Care Expires:  02/16/20    Subjective   "I am glad to be sitting up in the chair"  Pain/Comfort:  · Pain Rating 1: 8/10  · Location - Side 1: Right  · Location 1: leg  · Pain Addressed 1: Reposition, Cessation of Activity, Nurse notified  · Pain Rating Post-Intervention 1: 8/10      Objective:     Communicated with nurse prior to " session.  Patient found supine with telemetry upon PT entry to room.     General Precautions: Standard, fall   Orthopedic Precautions:spinal precautions   Braces: N/A     Functional Mobility:  · Bed Mobility:     · Rolling Left:  minimum assistance  · Supine to Sit: minimum assistance  · Sit to Supine: minimum assistance  · Transfers:     · Sit to Stand:  maximal assistance of 2 with hand-held assist from bed, R foot noted to slide posterior under the bed requiring manual cues to move it forward into a weight bearing position. Pt then stood with max assist of 1 with PT's foot behind the pt's R foot to allow for the R foot to be in a weight bearing position; max assist from bedside chair with RW in front x 2 trials with manual cues posterior to R foot to block the foot from sliding posterior  · Bed to/from Chair: maximal assistance with  hand-held assist  using  Stand Pivot with manual cues posterior to R foot to block the foot from sliding posterior  · Gait: ~10ft x 2 trials with RW with max assist +1 to follow with bedside chair. Pt performed gait with difficulty placing R foot properly for safety due to impaired cooordination and decreased proprioception/kinesthetic sense. Pt with narrow EUNICE due to R foot placement at times causing instability.  Pt tends to step too close to the walker causing instability posterior. pt required verbal cues to avoid stepping too far in front with R and L LE to allow for stability.    AM-PAC 6 CLICK MOBILITY  Turning over in bed (including adjusting bedclothes, sheets and blankets)?: 3  Sitting down on and standing up from a chair with arms (e.g., wheelchair, bedside commode, etc.): 2  Moving from lying on back to sitting on the side of the bed?: 3  Moving to and from a bed to a chair (including a wheelchair)?: 2  Need to walk in hospital room?: 2  Climbing 3-5 steps with a railing?: 1  Basic Mobility Total Score: 13     Therapeutic Activities and Exercises:   Pt sat on the EOB ~ 8  min with SBA prior to transfers    Patient left supine (after sitting up in the bedside chair ~ 2 hours, returned to bed as above) with all lines intact, call button in reach and nurse notified..    GOALS:   Multidisciplinary Problems     Physical Therapy Goals        Problem: Physical Therapy Goal    Goal Priority Disciplines Outcome Goal Variances Interventions   Physical Therapy Goal     PT, PT/OT Ongoing, Progressing     Description:  Goals to be met by: 2020     Patient will increase functional independence with mobility by performin. Sit to stand transfer with Minimal Assistance using Rolling Walker or LRAD  2. Bed to chair transfer with Moderate Assistance using Rolling Walker or LRAD  3. Gait  x 20 feet with Minimal Assistance using Rolling Walker or LRAD   4. Sitting at edge of bed x15 minutes with Supervision  5. Stand for 1 minutes with Contact Guard Assistance using Rolling Walker                      Time Tracking:     PT Received On: 20  PT Start Time: 1135     PT Stop Time: 1205(time added due to PT returned to transfer pt back to bed)  PT Total Time (min): 30 min     Billable Minutes: Gait Training 30    Treatment Type: Treatment  PT/PTA: PT     PTA Visit Number: 0     Leah Rincon, PT  2020

## 2020-01-17 NOTE — ASSESSMENT & PLAN NOTE
Pt is a 69yom with pmh RCC s/p nephrectomy 2013 now with known pulmonary metastasis who presented to outside hospital with acutely worsening back pain with spells of tussive lower extremity pain and weakness. MRI revealed a homogenously enhancing lesion in the lumbar spine. Pt transferred for neurosurgical evaluation. 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    - Patient neurologically stable on exam. No signs or symptoms of a CSF leak.   - No brace needed  - HV drain removed 1/16 without complications  - Bacitracin to incision BID  - Decrease frequency of labs to q 72 hours.   - No HOB or activity restrictions.   - Continue Dex 4mg q6h x 7 days postop, then wean to off. Continue PPI while on steroids.    - Pain Control:    - Pt continues to have main complaint of LE paresthesias. Discontinue gabapentin and start trial of Lyrica 150mg BID. CTM for improvement, titrate up if needed.   - Continue Tizanidine 2 mg q 8 hours, Tylenol 1g q6h. DC Dilaudid as pt not requiring frequently. Start Oxy IR 5mg q4h PRN. Oxy IR 10mg q6h PRN for breakthrough pain.     - Hypercalcemia: 2/2 malignancy, now resolved and has remained stable s/p Zometa 4 mg IV once (1/9/20). CTM on labs as scheduled q72h.  - HTN: Continue amlodipine 5mg daily.  - Metastatic RCC:  [facility] recommending patient FU with OSH oncologist in 2-3 weeks to revaluate. FU scheduled for 2/3.  - BPH: Continue home dose of Finasteride and Tamsulosin daily. Voiding spontaneously s/p Maciel removal. Bladder scan PRN.   - DVT prophylaxis: KENTON's, SCD's, SQH  - Bowel regimen: senna and miralax PRN, suppository PRN  - Atelectasis prophylaxis: IS q1h while awake, PT/OT/OOB, OOB > 6 hours per day.     Dispo: PT/OT recommending Rehab, pending placement. Patient medically stable for discharge.

## 2020-01-18 NOTE — SUBJECTIVE & OBJECTIVE
Interval History: NAEON. VSSAF. Pt sitting up in bed in no distress, no family at bedside. Reports pain well controlled on current regimen. Reports he was able to ambulate in the butt with therapy. Denies headaches, new weakness, paresthesias, and b/b dysfunction. Tolerating diet, voiding spontaneously. Medically stable for discharge pending rehab, likely Monday.    Medications:  Continuous Infusions:  Scheduled Meds:   acetaminophen  1,000 mg Oral Q6H    amLODIPine  5 mg Oral Daily    bacitracin   Topical (Top) BID    dexAMETHasone  4 mg Oral Q6H    finasteride  5 mg Oral Daily    heparin (porcine)  5,000 Units Subcutaneous Q8H    lidocaine  2 patch Transdermal Q24H    mupirocin   Nasal BID    pantoprazole  40 mg Oral Daily    pregabalin  150 mg Oral BID    tamsulosin  0.4 mg Oral Daily    tiZANidine  2 mg Oral Q8H     PRN Meds:acetaminophen, albuterol sulfate, aluminum-magnesium hydroxide-simethicone, bisacodyl, ondansetron, oxyCODONE, oxyCODONE, polyethylene glycol, promethazine (PHENERGAN) IVPB, senna-docusate 8.6-50 mg, sodium chloride 0.9%     Review of Systems  Objective:     Weight: 68.1 kg (150 lb 2.1 oz)  Body mass index is 24.98 kg/m².  Vital Signs (Most Recent):  Temp: 98.2 °F (36.8 °C) (01/18/20 1219)  Pulse: 80 (01/18/20 1219)  Resp: 17 (01/18/20 1219)  BP: (!) 117/57 (01/18/20 1219)  SpO2: 98 % (01/18/20 1219) Vital Signs (24h Range):  Temp:  [97 °F (36.1 °C)-98.2 °F (36.8 °C)] 98.2 °F (36.8 °C)  Pulse:  [66-99] 80  Resp:  [17-20] 17  SpO2:  [94 %-98 %] 98 %  BP: (117-158)/(57-72) 117/57                          Neurosurgery Physical Exam    General: well developed, well nourished, no distress.   Head: normocephalic, atraumatic  Neurologic: Alert and oriented. Thought content appropriate.  GCS: Motor: 6/Verbal: 5/Eyes: 4 GCS Total: 15  Mental Status: Awake, Alert, Oriented x 4  Language: No aphasia  Speech: No dysarthria  Cranial nerves: face symmetric, tongue midline, CN II-XII grossly  intact.   Eyes: pupils equal, round, reactive to light with accomodation, EOMI.   Pulmonary: normal respirations, no signs of respiratory distress  Abdomen: soft, non-distended, not tender to palpation  Skin: Skin is warm, dry and intact.  Sensory: intact to light touch throughout  Motor Strength: Moves all extremities spontaneously with good tone. R knee extension 4+/5. Otherwise full strength upper and lower extremities. No abnormal movements seen.   Hough's: Negative.  Babinski's: Negative.  Clonus: Negative.     Incision:  Clean, dry, staples intact. Skin edges well approximated. No surrounding erythema or edema. No drainage or TTP.     Significant Labs:  No results for input(s): GLU, NA, K, CL, CO2, BUN, CREATININE, CALCIUM, MG in the last 48 hours.  No results for input(s): WBC, HGB, HCT, PLT in the last 48 hours.  No results for input(s): LABPT, INR, APTT in the last 48 hours.  Microbiology Results (last 7 days)     ** No results found for the last 168 hours. **        All pertinent labs from the last 24 hours have been reviewed.    Significant Diagnostics:  I have reviewed and interpreted all pertinent imaging results/findings within the past 24 hours.

## 2020-01-18 NOTE — DISCHARGE INSTRUCTIONS
Neurosurgery Patient Information. Please follow ONLY the instructions that are checked below.    Activity Restrictions:  [x]  Return to work will be determined on an individual basis.  [x]  No lifting greater than 5-10 pounds.  [x]  Avoid bending and twisting the area of your surgery more than 45 degrees from neutral position in any direction.  [x]  No driving or operating machinery:  [x]  until cleared by your surgeon.  [x]  while taking narcotic pain medications or muscle relaxants.  [x]  No brace needed.  [x]  Slowly increase your ambulation [walking] over the next 2 weeks as tolerated. The goal is to be walking 1-2 miles by the time of your 2 week post op appointment.   [x]  Walk on paved surfaces only. It is okay to walk up and down stairs while holding onto a side rail.  [x]  No sexual activity for 6 weeks.    Discharge Medication/Follow-up:  [x]  Please refer to discharge medication reconciliation form.  [x]  Do not take ANY Aspirin or Aspirin containing products for 2 weeks after surgery.   [x]  Do not take ANY herbal supplements for 2 weeks after surgery.    [x]  Do not take ANY non-steroidal anti-inflammatory drugs (NSAIDS), including the following: ibuprofen, naprosyn, Aleve, Advil, Indocin, Mobic, or Celebrex for 2 weeks after surgery.   [x]  Prescriptions for appropriate medication will be given upon discharge.  [x]  Take docusate (Colace 100 mg): take one capsule a day as needed for constipation. You can get this over the counter.  [x]  Follow-up appointment:  [x]  10-14 days post-op for wound check by physician assistant/nurse  [x]  4-6 weeks with MD:  [x]  with x-rays  [x]  An appointment will be mailed to you.    Wound Care:  [x]  No bandage required. Keep your incision open to the air.  [x]  You may shower on the 2nd day after your surgery. Keep the incision clean and dry at all times. Please cover the incision while showering and REMOVE once you have completed taking your shower. Do not allow the  force of water to hit the incision. If the incision gets damp, pat it dry. Do not rub or scrub the incision.  [x]  You cannot take a bath until 8 weeks after surgery.  [x]  Apply bacitracin to incision twice a day for 2 weeks.    Call your doctor or go to the Emergency Room for any signs of infection, including: increased redness, drainage, pain, or fever (temperature ?101.5 for 24 hours). Call your doctor or go to the Emergency Room if there are any localized neurological changes; problems with speech, vision, numbness, tingling, weakness, or severe headache; or for other concerns.    Special Instructions:  [x]  No use of tobacco products.  [x]  Diet: Please eat a regular diet as tolerated.      Physicians need 3 days' notice for pain medicine to be refilled. Pain medicine will only be refilled between 8 AM and 5 PM, Monday through Friday, due to Food and Drug Administration regulation of documentation.        [facility] Neurosurgery Office: [phone]              [facility] Neurosurgery Office: [phone]   [place] Neurosurgery Office: [phone]

## 2020-01-18 NOTE — PROGRESS NOTES
[facility]  Neurosurgery  Progress Note    Subjective:     History of Present Illness: Pt is a 69yom with pmh RCC s/p nephrectomy 2013 now with known pulmonary metastasis who presented to outside hospital with acutely worsening back pain with spells of tussive lower extremity pain and weakness. MRI revealed a homogenously enhancing lesion in the lumbar spine. Pt transferred for neurosurgical evaluation.    Post-Op Info:  Procedure(s) (LRB):  L2-3 laminectomy for resection of intradural mass and L4-5 laminectomy; Microscope and micro instruments and neuromonitoring.  No instrumentation hardware (N/A)   4 Days Post-Op     Interval History: NAEON. VSSAF. Pt sitting up in bed in no distress, no family at bedside. Reports pain well controlled on current regimen. Reports he was able to ambulate in the butt with therapy. Denies headaches, new weakness, paresthesias, and b/b dysfunction. Tolerating diet, voiding spontaneously. Medically stable for discharge pending rehab, likely Monday.    Medications:  Continuous Infusions:  Scheduled Meds:   acetaminophen  1,000 mg Oral Q6H    amLODIPine  5 mg Oral Daily    bacitracin   Topical (Top) BID    dexAMETHasone  4 mg Oral Q6H    finasteride  5 mg Oral Daily    heparin (porcine)  5,000 Units Subcutaneous Q8H    lidocaine  2 patch Transdermal Q24H    mupirocin   Nasal BID    pantoprazole  40 mg Oral Daily    pregabalin  150 mg Oral BID    tamsulosin  0.4 mg Oral Daily    tiZANidine  2 mg Oral Q8H     PRN Meds:acetaminophen, albuterol sulfate, aluminum-magnesium hydroxide-simethicone, bisacodyl, ondansetron, oxyCODONE, oxyCODONE, polyethylene glycol, promethazine (PHENERGAN) IVPB, senna-docusate 8.6-50 mg, sodium chloride 0.9%     Review of Systems  Objective:     Weight: 68.1 kg (150 lb 2.1 oz)  Body mass index is 24.98 kg/m².  Vital Signs (Most Recent):  Temp: 98.2 °F (36.8 °C) (01/18/20 1219)  Pulse: 80 (01/18/20 1219)  Resp: 17 (01/18/20  1219)  BP: (!) 117/57 (01/18/20 1219)  SpO2: 98 % (01/18/20 1219) Vital Signs (24h Range):  Temp:  [97 °F (36.1 °C)-98.2 °F (36.8 °C)] 98.2 °F (36.8 °C)  Pulse:  [66-99] 80  Resp:  [17-20] 17  SpO2:  [94 %-98 %] 98 %  BP: (117-158)/(57-72) 117/57                          Neurosurgery Physical Exam    General: well developed, well nourished, no distress.   Head: normocephalic, atraumatic  Neurologic: Alert and oriented. Thought content appropriate.  GCS: Motor: 6/Verbal: 5/Eyes: 4 GCS Total: 15  Mental Status: Awake, Alert, Oriented x 4  Language: No aphasia  Speech: No dysarthria  Cranial nerves: face symmetric, tongue midline, CN II-XII grossly intact.   Eyes: pupils equal, round, reactive to light with accomodation, EOMI.   Pulmonary: normal respirations, no signs of respiratory distress  Abdomen: soft, non-distended, not tender to palpation  Skin: Skin is warm, dry and intact.  Sensory: intact to light touch throughout  Motor Strength: Moves all extremities spontaneously with good tone. R knee extension 4+/5. Otherwise full strength upper and lower extremities. No abnormal movements seen.   Hough's: Negative.  Babinski's: Negative.  Clonus: Negative.     Incision:  Clean, dry, staples intact. Skin edges well approximated. No surrounding erythema or edema. No drainage or TTP.     Significant Labs:  No results for input(s): GLU, NA, K, CL, CO2, BUN, CREATININE, CALCIUM, MG in the last 48 hours.  No results for input(s): WBC, HGB, HCT, PLT in the last 48 hours.  No results for input(s): LABPT, INR, APTT in the last 48 hours.  Microbiology Results (last 7 days)     ** No results found for the last 168 hours. **        All pertinent labs from the last 24 hours have been reviewed.    Significant Diagnostics:  I have reviewed and interpreted all pertinent imaging results/findings within the past 24 hours.    Assessment/Plan:     Back pain  Pt is a 69yom with pmh RCC s/p nephrectomy 2013 now with known pulmonary  metastasis who presented to outside hospital with acutely worsening back pain with spells of tussive lower extremity pain and weakness. MRI revealed a homogenously enhancing lesion in the lumbar spine. Pt transferred for neurosurgical evaluation. 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    - Patient neurologically stable on exam. No signs or symptoms of a CSF leak.   - No brace needed  - HV drain removed 1/16 without complications  - Bacitracin to incision BID  - Labs q 72 hours.   - No HOB or activity restrictions.   - Continue Dex 4mg q6h x 7 days postop, then wean to off. Begin taper on 1/21. Continue PPI while on steroids.    - Pain Control:    - Well controlled on current regimen.   - Stop scheduled Tylenol, will wean to PRN.   - Continue Lyrica 150mg BID.   - Continue Tizanidine 2 mg q 8 hours.   - Oxy IR 5mg q4h PRN. Oxy IR 10mg q6h PRN for breakthrough pain.     - Hypercalcemia: 2/2 malignancy, now resolved and has remained stable s/p Zometa 4 mg IV once (1/9/20). CTM on labs as scheduled q72h.  - HTN: Continue amlodipine 5mg daily.  - Metastatic RCC:  [facility] recommending patient FU with OSH oncologist in 2-3 weeks to revaluate. FU scheduled for 2/3.  - BPH: Continue home dose of Finasteride and Tamsulosin daily. Voiding spontaneously s/p Maciel removal. Bladder scan PRN.   - DVT prophylaxis: KENTON's, SCD's, SQH  - Bowel regimen: senna and miralax PRN, suppository PRN  - Atelectasis prophylaxis: IS q1h while awake, PT/OT/OOB, OOB > 6 hours per day.     Dispo: PT/OT recommending Rehab, pending placement. Patient medically stable for discharge. Accepted to rehab with tentative discharge planned for Monday.        Rissa Carias PA-C  Neurosurgery  [facility]

## 2020-01-18 NOTE — PLAN OF CARE
POC reviewed with patient this shift.  A/O x4.  Respirations unlabored.  Skin w/d.  Continent of b/b.  Urinal at bedside.  Incision to back continues with staples intact and WING.  No bleeding/drainage noted.  Pt reports intermittent numbness/tingling to RLE but reports is improved.  Also reports pain managed well with scheduled/PRN meds.  See flowsheet for full assessment.  VSS.  Able to verbalize wants/needs.  No c/o pain or discomfort at this time.  WCTM.

## 2020-01-18 NOTE — ASSESSMENT & PLAN NOTE
Pt is a 69yom with pmh RCC s/p nephrectomy 2013 now with known pulmonary metastasis who presented to outside hospital with acutely worsening back pain with spells of tussive lower extremity pain and weakness. MRI revealed a homogenously enhancing lesion in the lumbar spine. Pt transferred for neurosurgical evaluation. 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    - Patient neurologically stable on exam. No signs or symptoms of a CSF leak.   - No brace needed  - HV drain removed 1/16 without complications  - Bacitracin to incision BID  - Labs q 72 hours.   - No HOB or activity restrictions.   - Continue Dex 4mg q6h x 7 days postop, then wean to off. Begin taper on 1/21. Continue PPI while on steroids.    - Pain Control:    - Well controlled on current regimen.   - Stop scheduled Tylenol, will wean to PRN.   - Continue Lyrica 150mg BID.   - Continue Tizanidine 2 mg q 8 hours.   - Oxy IR 5mg q4h PRN. Oxy IR 10mg q6h PRN for breakthrough pain.     - Hypercalcemia: 2/2 malignancy, now resolved and has remained stable s/p Zometa 4 mg IV once (1/9/20). CTM on labs as scheduled q72h.  - HTN: Continue amlodipine 5mg daily.  - Metastatic RCC:  [facility] recommending patient FU with OS oncologist in 2-3 weeks to revaluate. FU scheduled for 2/3.  - BPH: Continue home dose of Finasteride and Tamsulosin daily. Voiding spontaneously s/p Maciel removal. Bladder scan PRN.   - DVT prophylaxis: KENTON's, SCD's, SQH  - Bowel regimen: senna and miralax PRN, suppository PRN  - Atelectasis prophylaxis: IS q1h while awake, PT/OT/OOB, OOB > 6 hours per day.     Dispo: PT/OT recommending Rehab, pending placement. Patient medically stable for discharge. Accepted to rehab with tentative discharge planned for Monday.

## 2020-01-19 NOTE — PLAN OF CARE
POC reviewed with patient this shift.  Quiet uneventful shift. Remains A/O x4.  Respirations unlabored.  Skin w/d.  Back/BLE pain appears to be managed well with PRN meds.  Tolerated meds whole with water without difficulty.  VSS.  See flowsheet for full assessment.  Able to verbalize wants/needs.  No c/o pain or discomfort at this time.

## 2020-01-19 NOTE — PLAN OF CARE
Patient remains free from injury or fall. Cardiac rhythm monitored. UP on a chair most of the day. Calm and cooperative. Pain management. No neuro changes noted or reported from initial assessment.Will continue to monitor.

## 2020-01-20 NOTE — PLAN OF CARE
Patient to be discharged to [facility].  Care deferred to [facility].  Patient to be transported via wheelchair van provided per [facility].  Neurosurgery clinic to schedule follow up appointment.    Future Appointments   Date Time Provider Department Center   1/30/2020 10:00 AM Alexandra Grayson RN [facility] NEUROS [facility]   2/3/2020  1:00 PM Nikia Anna MD [facility] ON [facility]   3/5/2020  9:30 AM Zackery Olivares MD [facility] NEUROS [facility] [facility]        01/20/20 1226   Final Note   Assessment Type Final Discharge Note   Anticipated Discharge Disposition Rehab   Hospital Follow Up  Appt(s) scheduled?   (Neurosurgery clinic to schedule follow up appointment.)   Discharge plans and expectations educations in teach back method with documentation complete? Yes

## 2020-01-20 NOTE — PROGRESS NOTES
[facility]  Neurosurgery  Progress Note    Subjective:     History of Present Illness: Pt is a 69yom with pmh RCC s/p nephrectomy 2013 now with known pulmonary metastasis who presented to outside hospital with acutely worsening back pain with spells of tussive lower extremity pain and weakness. MRI revealed a homogenously enhancing lesion in the lumbar spine. Pt transferred for neurosurgical evaluation.    Post-Op Info:  Procedure(s) (LRB):  L2-3 laminectomy for resection of intradural mass and L4-5 laminectomy; Microscope and micro instruments and neuromonitoring.  No instrumentation hardware (N/A)   6 Days Post-Op     Interval History:  NAEON. Reports improving paresthesias to right leg. Denies bowel/bladder issues. Denies new onset weakness. Stable for discharge to [facility]. F/u appointment scheduled. To begin steroid taper tomorrow. Discharge instructions given verbally to patient. He voiced understanding. All of his questions were answered.     Medications:  Continuous Infusions:  Scheduled Meds:   amLODIPine  5 mg Oral Daily    bacitracin   Topical (Top) BID    dexAMETHasone  4 mg Oral Q6H    finasteride  5 mg Oral Daily    heparin (porcine)  5,000 Units Subcutaneous Q8H    lidocaine  2 patch Transdermal Q24H    pantoprazole  40 mg Oral Daily    pregabalin  150 mg Oral BID    tamsulosin  0.4 mg Oral Daily    tiZANidine  2 mg Oral Q8H     PRN Meds:acetaminophen, albuterol sulfate, aluminum-magnesium hydroxide-simethicone, bisacodyl, Dextrose 10% Bolus, Dextrose 10% Bolus, glucagon (human recombinant), glucose, glucose, insulin aspart U-100, ondansetron, oxyCODONE, oxyCODONE, polyethylene glycol, promethazine (PHENERGAN) IVPB, senna-docusate 8.6-50 mg, sodium chloride 0.9%     Review of Systems  Objective:     Weight: 68.1 kg (150 lb 2.1 oz)  Body mass index is 24.98 kg/m².  Vital Signs (Most Recent):  Temp: 97.5 °F (36.4 °C) (01/20/20 0759)  Pulse: 70 (01/20/20 0759)  Resp:  18 (01/20/20 0759)  BP: 128/64 (01/20/20 0759)  SpO2: 95 % (01/20/20 0759) Vital Signs (24h Range):  Temp:  [97.5 °F (36.4 °C)-97.9 °F (36.6 °C)] 97.5 °F (36.4 °C)  Pulse:  [60-87] 70  Resp:  [18] 18  SpO2:  [93 %-98 %] 95 %  BP: (128-163)/(63-72) 128/64                          Neurosurgery Physical Exam  General: well developed, well nourished, no distress.   Head: normocephalic, atraumatic  Neurologic: Alert and oriented. Thought content appropriate.  GCS: Motor: 6/Verbal: 5/Eyes: 4 GCS Total: 15  Mental Status: Awake, Alert, Oriented x 4  Language: No aphasia  Speech: No dysarthria  Cranial nerves: face symmetric, tongue midline, CN II-XII grossly intact.   Eyes: pupils equal, round, reactive to light with accomodation, EOMI.   Pulmonary: normal respirations, no signs of respiratory distress  Abdomen: soft, non-distended, not tender to palpation  Skin: Skin is warm, dry and intact.  Sensory: intact to light touch throughout  Motor Strength: Moves all extremities spontaneously with good tone. R HF and R knee extension 4+/5. Otherwise full strength upper and lower extremities. No abnormal movements seen.   Hough's: Negative.  Babinski's: Negative.  Clonus: Negative.     Incision:  Clean, dry, staples intact. Skin edges well approximated. No surrounding erythema or edema. No drainage or TTP. No underlying fluid collection or hematoma.     Significant Labs:  Recent Labs   Lab 01/19/20  0430   *      K 4.4      CO2 20*   BUN 35*   CREATININE 1.3   CALCIUM 9.6     Recent Labs   Lab 01/19/20  0429   WBC 13.77*   HGB 10.7*   HCT 35.3*   *     No results for input(s): LABPT, INR, APTT in the last 48 hours.  Microbiology Results (last 7 days)     ** No results found for the last 168 hours. **        All pertinent labs from the last 24 hours have been reviewed.    Significant Diagnostics:  No results found in the last 24 hours.      Assessment/Plan:     Back pain  Pt is a 69yom with pmh RCC s/p  nephrectomy 2013 now with known pulmonary metastasis who presented to outside hospital with acutely worsening back pain with spells of tussive lower extremity pain and weakness. MRI revealed a homogenously enhancing lesion in the lumbar spine. Pt transferred for neurosurgical evaluation. 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    - Patient neurologically stable on exam. Denies postural headaches. Reports improvement to lower extremity paresthesias  - No brace needed  - HV drain removed 1/16 without complications  - Bacitracin to incision BID. Staples to be removed 1/28  - No HOB or activity restrictions.   - Continue Dex 4mg q6h x 7 days postop. Begin taper 1/21. Continue protonix while taking steroids.     - Pain Control:    - Well controlled on current regimen.   - Continue Lyrica 150mg BID.   - Continue Tizanidine 2 mg q 8 hours.   - Oxy IR 5mg q4h PRN. Oxy IR 10mg q6h PRN for breakthrough pain.  - Hyperglycemia: 2/2 steroid use. Low dose SSI initiated.   - Leukocytosis: 13.77. Remains afebrile. Most likely reactive 2/2 steroids.   - Hypercalcemia: resolved.  - HTN: Continue amlodipine 5mg daily.  - Metastatic RCC:  [facility] recommending patient FU with OSH oncologist in 2-3 weeks to revaluate. FU scheduled for 2/3.  - BPH: Continue home dose of Finasteride and Tamsulosin daily. Voiding spontaneously s/p Maciel removal.    Dispo: Patient medically stable for discharge. Discharge instructions given verbally to patient. He expressed understanding. All of his questions were answered. Follow up in NSGY clinic in 2 weeks. Fu with heme/ onc scheduled for 2/3.         Rosanna Allen PA-C  Neurosurgery  [facility]

## 2020-01-20 NOTE — PLAN OF CARE
[facility]    FACILITY TRANSFER ORDERS      Patient Name: Raymundo Richard  YOB: 1950    PCP: Sekou Lara III, MD   PCP Address: [address] [place] [address] [address] / [place] [address]  PCP Phone Number: [phone]  PCP Fax: [phone]    Encounter Date: 01/20/2020    Admit to: Inpatient Rehab    Vital Signs:  Routine    Diagnoses:   Active Hospital Problems    Diagnosis  POA    *Metastatic renal cell carcinoma [C64.9]  Yes    Hypertension [I10]  Unknown    Advanced care planning/counseling discussion [Z71.89]  Not Applicable    Goals of care, counseling/discussion [Z71.89]  Not Applicable    Palliative care encounter [Z51.5]  Not Applicable    Pre-op testing [Z01.818]  Not Applicable    Intradural mass [G95.89]  Yes    Lumbar foraminal stenosis [M48.061]  Yes    Preoperative clearance [Z01.818]  Not Applicable    Hypercalcemia of malignancy [E83.52]  Yes    Back pain [M54.9]  Yes      Resolved Hospital Problems   No resolved problems to display.       Allergies:  Review of patient's allergies indicates:   Allergen Reactions    Other Nausea And Vomiting     SODIUM PENATHOL  CAUSES SEVERE N & V       Diet: diabetic diet: 2000 calorie    Activities: Activity as tolerated      Labs, DVT prophylaxis, and Bowel regimen per facility protocol    CONSULTS:    Physical Therapy to evaluate and treat.  and Occupational Therapy to evaluate and treat.      WOUND CARE ORDERS  Yes: Surgical Wound:  Location: lumbar region    Consult ET nurse        Apply the following to wound:   Bacitracin bid x 7 days    Keep the incision clean and dry at all times. Please cover the incision while showering and REMOVE once you have completed taking your shower. Do not allow the force of water to hit the incision. If the incision gets damp, pat it dry. Do not rub or scrub the incision.      Medications: Review discharge medications with patient and family and provide education.      Pt to complete dex 4 mg q 6  hours today (1/20). Beginning tomorrow will begin steroid taper.    Current Discharge Medication List      START taking these medications    Details   amLODIPine (NORVASC) 5 MG tablet Take 1 tablet (5 mg total) by mouth once daily.  Qty: 30 tablet, Refills: 11      !! dexAMETHasone (DECADRON) 2 MG tablet Take 4mg q 8h for 3 days, then 4mg q 12h for 3 days, then 2mg q 8h for 3 days, then 2mg q 12h for 3 days, then 2mg daily for 6 days, then OFF. Tablets: 51  Qty: 51 tablet, Refills: 0      !! dexAMETHasone (DECADRON) 4 MG Tab Take 1 tablet (4 mg total) by mouth every 6 (six) hours. for 1 day  Qty: 4 tablet, Refills: 0      insulin aspart U-100 (NOVOLOG) 100 unit/mL (3 mL) InPn pen Inject 0-5 Units into the skin before meals and at bedtime as needed (Hyperglycemia).  Refills: 0      !! oxyCODONE (ROXICODONE) 10 mg Tab immediate release tablet Take 1 tablet (10 mg total) by mouth every 6 (six) hours as needed.  Refills: 0    Comments: Quantity prescribed more than 7 day supply? Yes, quantity medically necessary      !! oxyCODONE (ROXICODONE) 5 MG immediate release tablet Take 1 tablet (5 mg total) by mouth every 4 (four) hours as needed.  Refills: 0    Comments: Quantity prescribed more than 7 day supply? Yes, quantity medically necessary      pregabalin (LYRICA) 150 MG capsule Take 1 capsule (150 mg total) by mouth 2 (two) times daily.  Qty: 60 capsule, Refills: 6      tiZANidine (ZANAFLEX) 2 MG tablet Take 1 tablet (2 mg total) by mouth every 8 (eight) hours. for 10 days  Qty: 30 tablet, Refills: 0       !! - Potential duplicate medications found. Please discuss with provider.      CONTINUE these medications which have CHANGED    Details   !! pantoprazole (PROTONIX) 40 MG tablet Take 1 tablet (40 mg total) by mouth once daily.  Qty: 30 tablet, Refills: 11       !! - Potential duplicate medications found. Please discuss with provider.      CONTINUE these medications which have NOT CHANGED    Details   finasteride  (PROSCAR) 5 mg tablet Take 5 mg by mouth once daily.      !! pantoprazole (PROTONIX) 40 MG tablet Take 1 tablet (40 mg total) by mouth once daily.  Qty: 90 tablet, Refills: 3    Associated Diagnoses: Gastroesophageal reflux disease, esophagitis presence not specified      tamsulosin (FLOMAX) 0.4 mg Cap Take 0.4 mg by mouth once daily.       !! - Potential duplicate medications found. Please discuss with provider.      STOP taking these medications       gabapentin (NEURONTIN) 300 MG capsule Comments:   Reason for Stopping:         HYDROcodone-acetaminophen (NORCO)  mg per tablet Comments:   Reason for Stopping:         ibuprofen (ADVIL,MOTRIN) 600 MG tablet Comments:   Reason for Stopping:         methocarbamol (ROBAXIN) 500 MG Tab Comments:   Reason for Stopping:         predniSONE (DELTASONE) 20 MG tablet Comments:   Reason for Stopping:         saw palmetto frt/phytosterol 2 (PROSTATE SR) 160-250 mg Cap Comments:   Reason for Stopping:         terbinafine HCl (LAMISIL) 250 mg tablet Comments:   Reason for Stopping:                    _________________________________  Rosanna Allen PA-C  01/20/2020

## 2020-01-20 NOTE — PLAN OF CARE
POC reviewed with patient this shift.  Remains A/O x4.  Respirations unlabored.  Skin w/d.  Continent of b/b.  BM noted this shift.  BLE numbness/tingling continues intermittently but per pt is improved.  Staples to back remain CDI/WING.  No bleeding/drainage noted.  Pt in great anticipation of possible Dx to rehab later this AM.  Tolerated meds whole with water without difficulty.  Pain appears to be managed well with scheduled/PRN meds.  VSS.  See flowsheet for full assessment.  Able to verbalize wants/needs.  No c/o pain or discomfort at this time.  WCTM.

## 2020-01-20 NOTE — PT/OT/SLP PROGRESS
Occupational Therapy   Treatment    Name: Raymundo Richard  MRN: [id number]  Admitting Diagnosis:  Metastatic renal cell carcinoma  6 Days Post-Op    Recommendations:     Discharge Recommendations: rehabilitation facility  Discharge Equipment Recommendations:  (TBD)  Barriers to discharge:  None    Assessment:     Raymundo Richard is a 69 y.o. male with a medical diagnosis of Metastatic renal cell carcinoma.  He presents with good motivation and participation in therapy session. Impaired RLE coordination noted, pt remains a fall risk.  Performance deficits affecting function are weakness, impaired endurance, impaired self care skills, impaired functional mobilty, gait instability, impaired balance, decreased coordination, decreased safety awareness, decreased lower extremity function, impaired coordination. Pt would benefit from skilled OT services in order to maximize independence with ADLs and facilitate safe discharge.      Rehab Prognosis:  Good; patient would benefit from acute skilled OT services to address these deficits and reach maximum level of function.       Plan:     Patient to be seen 4 x/week to address the above listed problems via self-care/home management, therapeutic activities, therapeutic exercises  · Plan of Care Expires: 02/16/20  · Plan of Care Reviewed with: patient    Subjective     Pain/Comfort:  · Pain Rating 1: 0/10  · Pain Rating 2: 0/10    Objective:     Communicated with: RN prior to session.  Patient found HOB elevated with telemetry upon OT entry to room.    General Precautions: Standard, fall   Orthopedic Precautions:spinal precautions   Braces: N/A     Occupational Performance:     Bed Mobility:    · Patient completed Rolling/Turning to Right with stand by assistance  · Patient completed Supine to Sit with stand by assistance     Functional Mobility/Transfers:  · Patient completed Sit <> Stand Transfer with maximal assistance  with  no assistive device   · Patient completed Bed <> Chair  Transfer using Step Transfer technique with maximal assistance with no assistive device    Activities of Daily Living:  · Pt stated he completed ADLs prior to OT arrival    Therapeutic Exercise:  Pt completed 1 sets of 15 reps of B UE AROM exercise program sitting in bedside chair in order to work towards increasing UB strength/endurance and to maximize safety and (I) with mobility and self-care skills.  Pt completed the following exercises with initial demonstration from therapist: shld flexion/extension, elbow flexion/extension, and chair pushups chest press. Pt instructed to perform B UEs daily in order to improve B UE function and maintain joint integrity.      [facility] 6 Click ADL: 16    Treatment & Education:  -Therapist provided facilitation and instruction of proper body mechanics, energy conservation, and fall prevention strategies during tasks listed above.  -Pt educated on role of OT, POC and goals for therapy  -Pt educated on importance of OOB activities with staff member assistance and sitting OOB majority of the day.   -Pt verbalized understanding. Pt expressed no further concerns/questions      Patient left up in chair with all lines intact and call button in reachEducation:      GOALS:   Multidisciplinary Problems     Occupational Therapy Goals        Problem: Occupational Therapy Goal    Goal Priority Disciplines Outcome Interventions   Occupational Therapy Goal     OT, PT/OT Ongoing, Progressing    Description:  Goals to be met by: 1/26/2020     Patient will increase functional independence with ADLs by performing:    UE Dressing with [place].  LE Dressing with Minimal Assistance and Assistive Devices as needed.  Grooming while standing with Minimal Assistance.  Toileting from bedside commode with Moderate Assistance for hygiene and clothing management.   Toilet transfer to bedside commode with Moderate Assistance.                      Time Tracking:     OT Date of Treatment: 01/20/20  OT Start  Time: 0845  OT Stop Time: 0900  OT Total Time (min): 15 min    Billable Minutes:Therapeutic Exercise 15    Mona Girard OT  1/20/2020

## 2020-01-20 NOTE — PROGRESS NOTES
[facility]  Neurosurgery  Progress Note    Subjective:     History of Present Illness: Pt is a 69yom with pmh RCC s/p nephrectomy 2013 now with known pulmonary metastasis who presented to outside hospital with acutely worsening back pain with spells of tussive lower extremity pain and weakness. MRI revealed a homogenously enhancing lesion in the lumbar spine. Pt transferred for neurosurgical evaluation.    Post-Op Info:  Procedure(s) (LRB):  L2-3 laminectomy for resection of intradural mass and L4-5 laminectomy; Microscope and micro instruments and neuromonitoring.  No instrumentation hardware (N/A)   5 Days Post-Op     Interval History:  1/19: pending rehab likely Monday.    Medications:  Continuous Infusions:  Scheduled Meds:   amLODIPine  5 mg Oral Daily    bacitracin   Topical (Top) BID    dexAMETHasone  4 mg Oral Q6H    finasteride  5 mg Oral Daily    heparin (porcine)  5,000 Units Subcutaneous Q8H    lidocaine  2 patch Transdermal Q24H    mupirocin   Nasal BID    pantoprazole  40 mg Oral Daily    pregabalin  150 mg Oral BID    tamsulosin  0.4 mg Oral Daily    tiZANidine  2 mg Oral Q8H     PRN Meds:acetaminophen, albuterol sulfate, aluminum-magnesium hydroxide-simethicone, bisacodyl, ondansetron, oxyCODONE, oxyCODONE, polyethylene glycol, promethazine (PHENERGAN) IVPB, senna-docusate 8.6-50 mg, sodium chloride 0.9%     Review of Systems  Objective:     Weight: 68.1 kg (150 lb 2.1 oz)  Body mass index is 24.98 kg/m².  Vital Signs (Most Recent):  Temp: 97.6 °F (36.4 °C) (01/19/20 1142)  Pulse: 79 (01/19/20 1500)  Resp: 18 (01/19/20 1142)  BP: (!) 163/68 (01/19/20 1142)  SpO2: 96 % (01/19/20 1142) Vital Signs (24h Range):  Temp:  [97 °F (36.1 °C)-98.1 °F (36.7 °C)] 97.6 °F (36.4 °C)  Pulse:  [72-91] 79  Resp:  [16-20] 18  SpO2:  [92 %-96 %] 96 %  BP: (122-163)/(62-79) 163/68     Date 01/19/20 0700 - 01/20/20 0659   Shift 2582-4871 9325-4520 4176-2402 24 Hour Total   INTAKE   Shift  Total(mL/kg)       OUTPUT   Urine(mL/kg/hr)  800  800   Shift Total(mL/kg)  800(11.7)  800(11.7)   Weight (kg) 68.1 68.1 68.1 68.1                        Neurosurgery Physical Exam    4+/5 strength in the LEs b/l throughout  UE 5/5 strength  PERRL, EOMI, CNi  Incision c/d/i      Significant Labs:  Recent Labs   Lab 01/19/20  0430   *      K 4.4      CO2 20*   BUN 35*   CREATININE 1.3   CALCIUM 9.6     Recent Labs   Lab 01/19/20  0429   WBC 13.77*   HGB 10.7*   HCT 35.3*   *     No results for input(s): LABPT, INR, APTT in the last 48 hours.  Microbiology Results (last 7 days)     ** No results found for the last 168 hours. **        Recent Lab Results       01/19/20  0430   01/19/20 0429        Anion Gap 10       Baso #   0.03     Basophil%   0.2     BUN, Bld 35       Calcium 9.6       Chloride 109       CO2 20       Creatinine 1.3       Differential Method   Automated     eGFR if  >60.0       eGFR if non  55.7  Comment:  Calculation used to obtain the estimated glomerular filtration  rate (eGFR) is the CKD-EPI equation.          Eos #   0.0     Eosinophil%   0.0     Glucose 238       Gran # (ANC)   11.8     Gran%   85.6     Hematocrit   35.3     Hemoglobin   10.7     Immature Grans (Abs)   0.21  Comment:  Mild elevation in immature granulocytes is non specific and   can be seen in a variety of conditions including stress response,   acute inflammation, trauma and pregnancy. Correlation with other   laboratory and clinical findings is essential.       Immature Granulocytes   1.5     Lymph #   1.1     Lymph%   8.0     MCH   26.5     MCHC   30.3     MCV   87     Mono #   0.7     Mono%   4.7     MPV   9.8     nRBC   0     Platelets   404     Potassium 4.4       RBC   4.04     RDW   14.4     Sodium 139       WBC   13.77           Significant Diagnostics:  CT: No results found in the last 24 hours.  Echoencephalography: No results found in the last 24  hours.  MRI: No results found in the last 24 hours.    Assessment/Plan:     Back pain  Pt is a 69yom with pmh RCC s/p nephrectomy 2013 now with known pulmonary metastasis who presented to outside hospital with acutely worsening back pain with spells of tussive lower extremity pain and weakness. MRI revealed a homogenously enhancing lesion in the lumbar spine. Pt transferred for neurosurgical evaluation. 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    - Patient neurologically stable on exam. No signs or symptoms of a CSF leak.   - No brace needed  - HV drain removed 1/16 without complications  - Bacitracin to incision BID  - Labs q 72 hours.   - No HOB or activity restrictions.   - Continue Dex 4mg q6h x 7 days postop, then wean to off. Begin taper on 1/21. Continue PPI while on steroids.    - Pain Control:    - Well controlled on current regimen.   - Stop scheduled Tylenol, will wean to PRN.   - Continue Lyrica 150mg BID.   - Continue Tizanidine 2 mg q 8 hours.   - Oxy IR 5mg q4h PRN. Oxy IR 10mg q6h PRN for breakthrough pain.     - Hypercalcemia: 2/2 malignancy, now resolved and has remained stable s/p Zometa 4 mg IV once (1/9/20). CTM on labs as scheduled q72h.  - HTN: Continue amlodipine 5mg daily.  - Metastatic RCC:  [facility] recommending patient FU with OSH oncologist in 2-3 weeks to revaluate. FU scheduled for 2/3.  - BPH: Continue home dose of Finasteride and Tamsulosin daily. Voiding spontaneously s/p Maciel removal. Bladder scan PRN.   - DVT prophylaxis: KENTON's, SCD's, SQH  - Bowel regimen: senna and miralax PRN, suppository PRN  - Atelectasis prophylaxis: IS q1h while awake, PT/OT/OOB, OOB > 6 hours per day.     Dispo: PT/OT recommending Rehab, pending placement. Patient medically stable for discharge.   Accepted to rehab with tentative discharge planned for Monday.        Sid Quezada MD  Neurosurgery  [facility]

## 2020-01-20 NOTE — DISCHARGE SUMMARY
[facility]  Neurosurgery  Discharge Summary      Patient Name: Raymundo Richard  MRN: [id number]  Admission Date: 1/8/2020  Hospital Length of Stay: 12 days  Discharge Date and Time:  01/20/2020 11:43 AM  Attending Physician: Zackery Olivares MD   Discharging Provider: Rosanna Allen PA-C  Primary Care Provider: Sekou Lara III, MD    HPI:   Pt is a 69yom with pmh RCC s/p nephrectomy 2013 now with known pulmonary metastasis who presented to outside hospital with acutely worsening back pain with spells of tussive lower extremity pain and weakness. MRI revealed a homogenously enhancing lesion in the lumbar spine. Pt transferred for neurosurgical evaluation.    Procedure(s) (LRB):  L2-3 laminectomy for resection of intradural mass and L4-5 laminectomy; Microscope and micro instruments and neuromonitoring.  No instrumentation hardware (N/A)     Hospital Course: 1/8: Direct admit to Hospital medicine service for metastatic work up. HemeOnc and NSGY consulted.   1/9: MRI C/T/L spine ordered. HemeOnc recommending outpatient FU for RCC due to patient's desire not to pursue treatment. Seen by NSGY. Surgical resection vs conservative treatment vs no treatment discussed in detail. Palliative care consulted.    1/10: NAEON. Continues to endorse low back and BLE pain, not managed by current regimen. Consult placed to acute pain management for assistance. Plan for OR on Tuesday for L2 laminectomy for resection of intradural tumor and decompressive laminectomy at L4-5. Denies weakness, paresthesias, b/b dysfunction. Discussed plan with patient and patient's sister, all in agreement to surgical plan. Also discussed with patient's daughter Tayla over the phone, all questions answered.   1/11: NAEON. Continues to c/o back pain and BLE pain. Neurologically stable. Pending OR on Tues.   1/12: NAEON.   1/13: NAEON. Vitals and labs stable. Ca remains WNL. Pt resting in bed, no family at bedside. Reports back pain  and BLE pain somewhat improved today. Denies weakness, paresthesias, n/v, and b/b dysfunction. Plan for OR tomorrow for lumbar laminectomy and intradural tumor resection. NPO at midnight. All questions answered.  1/14: SHANA. Vitals stable. NPO for surgery today, coags within normal range. Calcium trending down. Pain regimen managed by anesthesia currently controlling pain. Denies weakness, paresthesias, b/b dysfunction. OR for lumbar lami with resection of intradural tumor. No intra op complications. Patient tolerated procedure well. Recovered in PACU.   1/15: SHANA. POD#1 from lumbar laminectomy with tumor resection. HOB remains flat x 24hrs postop, no signs/symptoms of CSF leak. HV drain to gravity with minimal output. Will DC HOB restrictions today at 5pm. Pt resting in bed in no distress, daughter at bedside. Pt reports back pain 8/10, controlled with PRN pain meds and improved since before surgery. Otherwise no acute complaints. Denies headaches, vision changes, weakness, paresthesias, and b/b dysfunction. PT/OT ordered for tomorrow.  1/16: SHANA. HOB elevated yesterday afternoon. Patient out of bed this morning. Denies any headaches or dizziness. HV drain with minimal output which is serosanguineous. Drain removed without complication. Evaled by PT/OT who recommend Rehab. SW to get choices.   1/17: SHANA. Pt resting comfortably in bed, no family at bedside. Continues to report BLE radiculopathy and paresthesias, R>L, but feels it is improved since yesterday. Back pain well controlled. Reports he was able to sit on edge of bed with therapy yesterday but has not been able to ambulate yet. Denies any headaches, n/v, new weakness and paresthesias, and b/b dysfunction. Urinating spontaneously post-Maciel removal. Tolerating diet. Pending rehab placement.   1/18: SHANA. VSSAF. Pt sitting up in bed in no distress, no family at bedside. Reports pain well controlled on current regimen. Reports he was able to ambulate  in the butt with therapy. Denies headaches, new weakness, paresthesias, and b/b dysfunction. Tolerating diet, voiding spontaneously. Medically stable for discharge pending rehab, likely Monday.  1/19: pending rehab likely Monday.  1/20: NAEON. Reports improving paresthesias to right leg. Denies bowel/bladder issues. Denies new onset weakness. Stable for discharge to [facility]. F/u appointment scheduled. To begin steroid taper tomorrow. Discharge instructions given verbally to patient. He voiced understanding. All of his questions were answered.     Consults:   Consults (From admission, onward)        Status Ordering Provider     Inpatient consult to Hematology/Oncology  Once     Provider:  (Not yet assigned)    Completed LEROY CHOW     Inpatient consult to [facility] Medicine-General  Once     Provider:  (Not yet assigned)    Completed CIARA NICOLE     Inpatient consult to Pain Management  Once     Provider:  (Not yet assigned)    Completed LEROY CHOW     Inpatient consult to Palliative Care  Once     Provider:  (Not yet assigned)    Completed LEROY CHOW          Significant Diagnostic Studies: CBC, BMP, PTT, T&S, UA  XR chest, MRI thoracic spine, MRI c spine, MRI lumbar spine    Pending Diagnostic Studies:     Procedure Component Value Units Date/Time    Specimen to Pathology, Surgery Neurosurgery [[id number]] Collected:  01/14/20 1841    Order Status:  Sent Lab Status:  In process Updated:  01/15/20 1128        Final Active Diagnoses:    Diagnosis Date Noted POA    PRINCIPAL PROBLEM:  Metastatic renal cell carcinoma [C64.9] 02/18/2019 Yes    Hypertension [I10] 01/11/2020 Unknown    Advanced care planning/counseling discussion [Z71.89]  Not Applicable    Goals of care, counseling/discussion [Z71.89]  Not Applicable    Palliative care encounter [Z51.5] 01/10/2020 Not Applicable    Pre-op testing [Z01.818]  Not Applicable    Intradural mass [G95.89]  Yes    Lumbar foraminal stenosis  [M48.061]  Yes    Preoperative clearance [Z01.818] 01/09/2020 Not Applicable    Hypercalcemia of malignancy [E83.52] 01/09/2020 Yes    Back pain [M54.9] 01/08/2020 Yes      Problems Resolved During this Admission:      Discharged Condition: good    Disposition: Home or Self Care    Follow Up: 2 weeks with NSGY. 2/3 with Heme/onc  Follow-up Information     Sekou Lara III, MD.    Specialty:  Family Medicine  Why:  Outpatient Services  Contact information:  [address] [place] [address]  [address] [place] [id number]  [phone]                 Patient Instructions:      Notify your health care provider if you experience any of the following:  temperature >100.4     Notify your health care provider if you experience any of the following:  persistent nausea and vomiting or diarrhea     Notify your health care provider if you experience any of the following:  severe uncontrolled pain     Notify your health care provider if you experience any of the following:  redness, tenderness, or signs of infection (pain, swelling, redness, odor or green/yellow discharge around incision site)     Notify your health care provider if you experience any of the following:  difficulty breathing or increased cough     Notify your health care provider if you experience any of the following:  severe persistent headache     Notify your health care provider if you experience any of the following:  worsening rash     Notify your health care provider if you experience any of the following:  persistent dizziness, light-headedness, or visual disturbances     Notify your health care provider if you experience any of the following:  increased confusion or weakness     Activity as tolerated     Medications:  Reconciled Home Medications:      Medication List      START taking these medications    amLODIPine 5 MG tablet  Commonly known as:  NORVASC  Take 1 tablet (5 mg total) by mouth once daily.  Start taking on:  January 21, 2020     * dexAMETHasone 4 MG  Tab  Commonly known as:  DECADRON  Take 1 tablet (4 mg total) by mouth every 6 (six) hours. for 1 day     * dexAMETHasone 2 MG tablet  Commonly known as:  DECADRON  Take 4mg q 8h for 3 days, then 4mg q 12h for 3 days, then 2mg q 8h for 3 days, then 2mg q 12h for 3 days, then 2mg daily for 6 days, then OFF. Tablets: 51     insulin aspart U-100 100 unit/mL (3 mL) Inpn pen  Commonly known as:  NovoLOG  Inject 0-5 Units into the skin before meals and at bedtime as needed (Hyperglycemia).     * oxyCODONE 5 MG immediate release tablet  Commonly known as:  ROXICODONE  Take 1 tablet (5 mg total) by mouth every 4 (four) hours as needed.     * oxyCODONE 10 mg Tab immediate release tablet  Commonly known as:  ROXICODONE  Take 1 tablet (10 mg total) by mouth every 6 (six) hours as needed.     pregabalin 150 MG capsule  Commonly known as:  LYRICA  Take 1 capsule (150 mg total) by mouth 2 (two) times daily.     tiZANidine 2 MG tablet  Commonly known as:  ZANAFLEX  Take 1 tablet (2 mg total) by mouth every 8 (eight) hours. for 10 days         * This list has 4 medication(s) that are the same as other medications prescribed for you. Read the directions carefully, and ask your doctor or other care provider to review them with you.            CHANGE how you take these medications    * pantoprazole 40 MG tablet  Commonly known as:  PROTONIX  Take 1 tablet (40 mg total) by mouth once daily.  What changed:  Another medication with the same name was added. Make sure you understand how and when to take each.     * pantoprazole 40 MG tablet  Commonly known as:  PROTONIX  Take 1 tablet (40 mg total) by mouth once daily.  Start taking on:  January 21, 2020  What changed:  You were already taking a medication with the same name, and this prescription was added. Make sure you understand how and when to take each.         * This list has 2 medication(s) that are the same as other medications prescribed for you. Read the directions carefully,  and ask your doctor or other care provider to review them with you.            CONTINUE taking these medications    finasteride 5 mg tablet  Commonly known as:  PROSCAR  Take 5 mg by mouth once daily.     tamsulosin 0.4 mg Cap  Commonly known as:  FLOMAX  Take 0.4 mg by mouth once daily.        STOP taking these medications    gabapentin 300 MG capsule  Commonly known as:  NEURONTIN     HYDROcodone-acetaminophen  mg per tablet  Commonly known as:  NORCO     ibuprofen 600 MG tablet  Commonly known as:  ADVIL,MOTRIN     methocarbamol 500 MG Tab  Commonly known as:  ROBAXIN     predniSONE 20 MG tablet  Commonly known as:  DELTASONE     Prostate -250 mg Cap  Generic drug:  saw palmetto frt/phytosterol 2     terbinafine HCl 250 mg tablet  Commonly known as:  LAMISIL            Rosanna Allen PA-C  Neurosurgery  [facility]

## 2020-01-20 NOTE — ASSESSMENT & PLAN NOTE
Pt is a 69yom with pmh RCC s/p nephrectomy 2013 now with known pulmonary metastasis who presented to outside hospital with acutely worsening back pain with spells of tussive lower extremity pain and weakness. MRI revealed a homogenously enhancing lesion in the lumbar spine. Pt transferred for neurosurgical evaluation. 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    - Patient neurologically stable on exam. Denies postural headaches. Reports improvement to lower extremity paresthesias  - No brace needed  - HV drain removed 1/16 without complications  - Bacitracin to incision BID. Staples to be removed 1/28  - No HOB or activity restrictions.   - Continue Dex 4mg q6h x 7 days postop. Begin taper 1/21. Continue protonix while taking steroids.     - Pain Control:    - Well controlled on current regimen.   - Continue Lyrica 150mg BID.   - Continue Tizanidine 2 mg q 8 hours.   - Oxy IR 5mg q4h PRN. Oxy IR 10mg q6h PRN for breakthrough pain.  - Hyperglycemia: 2/2 steroid use. Low dose SSI initiated.   - Leukocytosis: 13.77. Remains afebrile. Most likely reactive 2/2 steroids.   - Hypercalcemia: resolved.  - HTN: Continue amlodipine 5mg daily.  - [facility] to revaluate. FU scheduled for 2/3.  - BPH: Continue home dose of Finasteride and Tamsulosin daily. Voiding spontaneously s/p Maciel removal.    Dispo: Patient medically stable for discharge. Discharge instructions given verbally to patient. He expressed understanding. All of his questions were answered. Follow up in NSGY clinic in 2 weeks. Fu with heme/ onc scheduled for 2/3.

## 2020-01-20 NOTE — PT/OT/SLP PROGRESS
"Physical Therapy Treatment    Patient Name:  Raymundo Richard   MRN:  [id number]    Recommendations:     Discharge Recommendations:  rehabilitation facility   Discharge Equipment Recommendations: (TBD as progresses)   Barriers to discharge: Decreased caregiver support lives alone    Assessment:     Raymundo Richard is a 69 y.o. male admitted with a medical diagnosis of Metastatic renal cell carcinoma.  He presents with the following impairments/functional limitations:  weakness, impaired functional mobilty, impaired sensation, gait instability, impaired balance, decreased lower extremity function, decreased coordination . Pt is motivated to progress with functional mobility. Pt is unsafe to perform functional mobility without assist due to pt requires CGA for bed mobility, moderate assist for transfers and gait with RW. Pt with weakness and impaired sensation on R LE causing instability.    Rehab Prognosis: Good; patient would benefit from acute skilled PT services to address these deficits and reach maximum level of function.    Recent Surgery: Procedure(s) (LRB):  L2-3 laminectomy for resection of intradural mass and L4-5 laminectomy; Microscope and micro instruments and neuromonitoring.  No instrumentation hardware (N/A) 6 Days Post-Op    Plan:     During this hospitalization, patient to be seen 4 x/week to address the identified rehab impairments via therapeutic activities, gait training, therapeutic exercises and progress toward the following goals:    · Plan of Care Expires:  02/16/20    Subjective   "I got up in the chair this weekend"    Pain/Comfort:  · Pain Rating 1: 0/10  · Pain Rating Post-Intervention 1: 0/10      Objective:     Communicated with nurse prior to session.  Patient found supine with telemetry upon PT entry to room.     General Precautions: Standard, fall   Orthopedic Precautions:spinal precautions   Braces: N/A     Functional Mobility:  · Bed Mobility:     · Supine to Sit: minimum " assistance  · Transfers:     · Sit to Stand:  moderate assistance with rolling walker  · Bed to Chair: moderate assistance with  hand-held assist  using  Stand Pivot  · Gait: 14ft then 18ft then 20ft with RW with moderate assist with verbal cues for R foot placement and sequencing. pt with R knee flexing suddenly at times during stance phase causing LOB requiring assist to remain upright. pt performed gait with flexed trunk, having to look at his R foot to properly place it due to impaired sensation. pt improved with gait stability as he progressed.    AM-PAC 6 CLICK MOBILITY  Turning over in bed (including adjusting bedclothes, sheets and blankets)?: 3  Sitting down on and standing up from a chair with arms (e.g., wheelchair, bedside commode, etc.): 2  Moving from lying on back to sitting on the side of the bed?: 3  Moving to and from a bed to a chair (including a wheelchair)?: 2  Need to walk in hospital room?: 2  Climbing 3-5 steps with a railing?: 1  Basic Mobility Total Score: 13     Patient left up in chair with all lines intact, call button in reach and nurse notified..    GOALS:   Multidisciplinary Problems     Physical Therapy Goals        Problem: Physical Therapy Goal    Goal Priority Disciplines Outcome Goal Variances Interventions   Physical Therapy Goal     PT, PT/OT Ongoing, Progressing     Description:  Goals to be met by: 2020     Patient will increase functional independence with mobility by performin. Sit to stand transfer with Minimal Assistance using Rolling Walker or LRAD  2. Bed to chair transfer with Moderate Assistance using Rolling Walker or LRAD  3. Gait  x 20 feet with Minimal Assistance using Rolling Walker or LRAD   4. Sitting at edge of bed x15 minutes with Supervision  5. Stand for 1 minutes with Contact Guard Assistance using Rolling Walker                      Time Tracking:     PT Received On: 20  PT Start Time: 940     PT Stop Time: 1014  PT Total Time (min):  34 min     Billable Minutes: Gait Training 34    Treatment Type: Treatment  PT/PTA: PT     PTA Visit Number: 0     Leah Rincon, PT  01/20/2020

## 2020-01-20 NOTE — PLAN OF CARE
Patient remains free from injury or fall. Fall precautions applied. No neuro changes noted or reported from initial assessment.UP in chair for meals.  Plan= DC to rehab pending. Will continue to monitor.

## 2020-01-20 NOTE — PLAN OF CARE
01/20/20 1119   Post-Acute Status   Post-Acute Authorization Placement   Post-Acute Placement Status Set-up Complete       Pt has been accepted by [facility]. Nurse can call report to [phone] around 12.  Transport setup by W/C for 1pm.  SW in contact with CM and Medical staff.         Adrian Tesfaye LMSW  [facility]   Ext. [phone]

## 2020-01-20 NOTE — SUBJECTIVE & OBJECTIVE
Interval History:  1/19: pending rehab likely Monday.    Medications:  Continuous Infusions:  Scheduled Meds:   amLODIPine  5 mg Oral Daily    bacitracin   Topical (Top) BID    dexAMETHasone  4 mg Oral Q6H    finasteride  5 mg Oral Daily    heparin (porcine)  5,000 Units Subcutaneous Q8H    lidocaine  2 patch Transdermal Q24H    mupirocin   Nasal BID    pantoprazole  40 mg Oral Daily    pregabalin  150 mg Oral BID    tamsulosin  0.4 mg Oral Daily    tiZANidine  2 mg Oral Q8H     PRN Meds:acetaminophen, albuterol sulfate, aluminum-magnesium hydroxide-simethicone, bisacodyl, ondansetron, oxyCODONE, oxyCODONE, polyethylene glycol, promethazine (PHENERGAN) IVPB, senna-docusate 8.6-50 mg, sodium chloride 0.9%     Review of Systems  Objective:     Weight: 68.1 kg (150 lb 2.1 oz)  Body mass index is 24.98 kg/m².  Vital Signs (Most Recent):  Temp: 97.6 °F (36.4 °C) (01/19/20 1142)  Pulse: 79 (01/19/20 1500)  Resp: 18 (01/19/20 1142)  BP: (!) 163/68 (01/19/20 1142)  SpO2: 96 % (01/19/20 1142) Vital Signs (24h Range):  Temp:  [97 °F (36.1 °C)-98.1 °F (36.7 °C)] 97.6 °F (36.4 °C)  Pulse:  [72-91] 79  Resp:  [16-20] 18  SpO2:  [92 %-96 %] 96 %  BP: (122-163)/(62-79) 163/68     Date 01/19/20 0700 - 01/20/20 0659   Shift 9640-4574 2257-7463 0229-4885 24 Hour Total   INTAKE   Shift Total(mL/kg)       OUTPUT   Urine(mL/kg/hr)  800  800   Shift Total(mL/kg)  800(11.7)  800(11.7)   Weight (kg) 68.1 68.1 68.1 68.1                        Neurosurgery Physical Exam    4+/5 strength in the LEs b/l throughout  UE 5/5 strength  PERRL, EOMI, CNi  Incision c/d/i      Significant Labs:  Recent Labs   Lab 01/19/20  0430   *      K 4.4      CO2 20*   BUN 35*   CREATININE 1.3   CALCIUM 9.6     Recent Labs   Lab 01/19/20  0429   WBC 13.77*   HGB 10.7*   HCT 35.3*   *     No results for input(s): LABPT, INR, APTT in the last 48 hours.  Microbiology Results (last 7 days)     ** No results found for the last 168  hours. **        Recent Lab Results       01/19/20  0430   01/19/20  0429        Anion Gap 10       Baso #   0.03     Basophil%   0.2     BUN, Bld 35       Calcium 9.6       Chloride 109       CO2 20       Creatinine 1.3       Differential Method   Automated     eGFR if  >60.0       eGFR if non  55.7  Comment:  Calculation used to obtain the estimated glomerular filtration  rate (eGFR) is the CKD-EPI equation.          Eos #   0.0     Eosinophil%   0.0     Glucose 238       Gran # (ANC)   11.8     Gran%   85.6     Hematocrit   35.3     Hemoglobin   10.7     Immature Grans (Abs)   0.21  Comment:  Mild elevation in immature granulocytes is non specific and   can be seen in a variety of conditions including stress response,   acute inflammation, trauma and pregnancy. Correlation with other   laboratory and clinical findings is essential.       Immature Granulocytes   1.5     Lymph #   1.1     Lymph%   8.0     MCH   26.5     MCHC   30.3     MCV   87     Mono #   0.7     Mono%   4.7     MPV   9.8     nRBC   0     Platelets   404     Potassium 4.4       RBC   4.04     RDW   14.4     Sodium 139       WBC   13.77           Significant Diagnostics:  CT: No results found in the last 24 hours.  Echoencephalography: No results found in the last 24 hours.  MRI: No results found in the last 24 hours.

## 2020-01-20 NOTE — SUBJECTIVE & OBJECTIVE
Interval History:  NAEON. Reports improving paresthesias to right leg. Denies bowel/bladder issues. Denies new onset weakness. Stable for discharge to [facility]. F/u appointment scheduled. To begin steroid taper tomorrow. Discharge instructions given verbally to patient. He voiced understanding. All of his questions were answered.     Medications:  Continuous Infusions:  Scheduled Meds:   amLODIPine  5 mg Oral Daily    bacitracin   Topical (Top) BID    dexAMETHasone  4 mg Oral Q6H    finasteride  5 mg Oral Daily    heparin (porcine)  5,000 Units Subcutaneous Q8H    lidocaine  2 patch Transdermal Q24H    pantoprazole  40 mg Oral Daily    pregabalin  150 mg Oral BID    tamsulosin  0.4 mg Oral Daily    tiZANidine  2 mg Oral Q8H     PRN Meds:acetaminophen, albuterol sulfate, aluminum-magnesium hydroxide-simethicone, bisacodyl, Dextrose 10% Bolus, Dextrose 10% Bolus, glucagon (human recombinant), glucose, glucose, insulin aspart U-100, ondansetron, oxyCODONE, oxyCODONE, polyethylene glycol, promethazine (PHENERGAN) IVPB, senna-docusate 8.6-50 mg, sodium chloride 0.9%     Review of Systems  Objective:     Weight: 68.1 kg (150 lb 2.1 oz)  Body mass index is 24.98 kg/m².  Vital Signs (Most Recent):  Temp: 97.5 °F (36.4 °C) (01/20/20 0759)  Pulse: 70 (01/20/20 0759)  Resp: 18 (01/20/20 0759)  BP: 128/64 (01/20/20 0759)  SpO2: 95 % (01/20/20 0759) Vital Signs (24h Range):  Temp:  [97.5 °F (36.4 °C)-97.9 °F (36.6 °C)] 97.5 °F (36.4 °C)  Pulse:  [60-87] 70  Resp:  [18] 18  SpO2:  [93 %-98 %] 95 %  BP: (128-163)/(63-72) 128/64                          Neurosurgery Physical Exam  General: well developed, well nourished, no distress.   Head: normocephalic, atraumatic  Neurologic: Alert and oriented. Thought content appropriate.  GCS: Motor: 6/Verbal: 5/Eyes: 4 GCS Total: 15  Mental Status: Awake, Alert, Oriented x 4  Language: No aphasia  Speech: No dysarthria  Cranial nerves: face symmetric, tongue midline, CN  II-XII grossly intact.   Eyes: pupils equal, round, reactive to light with accomodation, EOMI.   Pulmonary: normal respirations, no signs of respiratory distress  Abdomen: soft, non-distended, not tender to palpation  Skin: Skin is warm, dry and intact.  Sensory: intact to light touch throughout  Motor Strength: Moves all extremities spontaneously with good tone. R HF and R knee extension 4+/5. Otherwise full strength upper and lower extremities. No abnormal movements seen.   Hough's: Negative.  Babinski's: Negative.  Clonus: Negative.     Incision:  Clean, dry, staples intact. Skin edges well approximated. No surrounding erythema or edema. No drainage or TTP. No underlying fluid collection or hematoma.     Significant Labs:  Recent Labs   Lab 01/19/20  0430   *      K 4.4      CO2 20*   BUN 35*   CREATININE 1.3   CALCIUM 9.6     Recent Labs   Lab 01/19/20  0429   WBC 13.77*   HGB 10.7*   HCT 35.3*   *     No results for input(s): LABPT, INR, APTT in the last 48 hours.  Microbiology Results (last 7 days)     ** No results found for the last 168 hours. **        All pertinent labs from the last 24 hours have been reviewed.    Significant Diagnostics:  No results found in the last 24 hours.

## 2020-01-20 NOTE — PLAN OF CARE
Problem: Occupational Therapy Goal  Goal: Occupational Therapy Goal  Description  Goals to be met by: 1/26/2020     Patient will increase functional independence with ADLs by performing:    UE Dressing with Teller.  LE Dressing with Minimal Assistance and Assistive Devices as needed.  Grooming while standing with Minimal Assistance.  Toileting from bedside commode with Moderate Assistance for hygiene and clothing management.   Toilet transfer to bedside commode with Moderate Assistance.     Outcome: Ongoing, Progressing     Goals reviewed and remain appropriate, continue POC    CARLYLE Sierra/L  1/20/2020   Pager #: [phone]

## 2020-01-30 NOTE — TELEPHONE ENCOUNTER
Called pt to rescheduled his 2 week post-op appt, as he did not show for his appt scheduled today. No answer, LVM explaining reason for call and requested call back to reschedule appt for wound check and staple removal.

## 2020-02-03 NOTE — TELEPHONE ENCOUNTER
----- Message from Jennifer Wesley sent at 2/3/2020 10:47 AM CST -----  Contact: [facility]  Patient is just being released today from Rehab but has an appt scheduled for 1PM today.  Please call back to get it resheduled at [phone] (home).  Thanks

## 2020-02-03 NOTE — TELEPHONE ENCOUNTER
Spoke to pt to r/s missed consult apt today due to pt being in rehab. Pt advised for me to call him back in about a hour today to r/s apt.

## 2020-02-05 NOTE — PHYSICIAN QUERY
"PT Name: Raymundo Richard  MR #: [id number]    Physician Query Form - Pathology Findings Clarification     CDS: Nay Hernandez RN, CCDS       Contact information: [email or web address]    This form is a permanent document in the medical record.     Query Date: February 5, 2020      By submitting this query, we are merely seeking further clarification of documentation.  Please utilize your independent clinical judgment when addressing the question(s) below.      The medical record contains the following:     Findings Supporting Clinical Information Location in Medical Record   Spine, Intradural Tumor--Metastatic carcinoma with evidence for renal primary [facility] DIAGNOSIS:  SPINE, INTRADURAL "TUMOR", RESECTION ([id number]; [facility], [place], [place]; COLLECTED 1/14/2020):  -[facility] DIAGNOSIS:  SPINE, INTRADURAL "TUMOR", RESECTION ([id number]; [facility], [place], [place];  COLLECTED 1/14/2020):  -Metastatic carcinoma with evidence for renal primary.    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  History of disseminated renal cell CA.  PROCEDURE PERFORMED:  L2 laminectomy for resection of intradural extramedullary spinal cord tumor, L2.   1/14-Path (Final result 2/3/2020 @ 1405)                      1/14-Op Note       Please document the clinical significance of the Pathologists findings of ___Spine, Intradural Tumor--Metastatic carcinoma with evidence for renal primary____.    [   X] I agree with the Pathology Findings   [   ] I do not agree with the Pathology Findings   [   ] Other/Clarification of Findings:   [  ] Clinically Undetermined       Please document in your progress notes daily for the duration of treatment until resolved and include in your discharge summary.                   "

## 2020-02-12 NOTE — TELEPHONE ENCOUNTER
Spoke to pt to r/s consult apt with Dr. Anna. Pt confirmed apt date and time r/s and verbalized understanding.

## 2020-02-18 NOTE — TELEPHONE ENCOUNTER
Left message for pt daughter to discuss pt cancelling apt with Joselito. Instructed pt daughter to call

## 2020-02-18 NOTE — TELEPHONE ENCOUNTER
----- Message from Milana Zamudio sent at 2/17/2020  4:22 PM CST -----      Pt  Is calling to  Can celed  Deyanira  For tomorrow and call  Back  When  He  Can reschedule //[phone]  Daughter anushka

## 2020-02-20 PROBLEM — R26.9 GAIT ABNORMALITY: Status: ACTIVE | Noted: 2020-01-01

## 2020-02-20 NOTE — PLAN OF CARE
"  [facility] Occupational Therapy  Initial Evaluation     Date: 2/20/2020  Patient: Raymundo Richard  Chart Number: [id number]    Therapy Diagnosis:   Encounter Diagnoses   Name Primary?    Decreased range of motion of shoulder, unspecified laterality     Impaired instrumental activities of daily living (IADL)      Physician: Tejinder Foreman MD    Physician Orders: OT eval & treat  Medical Diagnosis: Impaired mobility and ADLs  Evaluation Date: 2/20/2020  Plan of Care Expiration Period: 4/16/2020  Insurance Authorization period Expiration: 12/31/2020  Date of Return to MD: tba  Visit # / Visits Authorized: 1 / 1  FOTO: not assessed     Time In:0905  Time Out: 1000  Total Billable (one on one) Time: 55 minutes    Precautions: Standard    Subjective     History of Current Condition: Mr. Richard had a lumbar lami on 1/14/2020 for removal of metastatic mass. He spent 2 weeks in inpatient rehab, from 1/24/2020 - 2/8/2020. He has degenerative changes in the cervical spine and metastatic renal cell carcinoma.    Involved Side: earline UE  Dominant Side: Right  Date of Onset: Paraparesis began on fall 2019  Surgical Procedure: 1/14/2020  Imaging: MRI cervical & thoracic spine 1/9/2020  1. 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  2. Left lung mass with multiple bilateral pulmonary nodules, likely metastatic disease.  Further evaluation with chest CT advised.  3. Intramuscular lesion in the right posterior paraspinal musculature of the cervical spine at the level of C6-C7 may represent vascular lesion or metastatic disease.  4. Degenerative changes of the cervical spine.    Previous Therapy: acute therapies, IPR therapies    Patient's Goals for Therapy: "I want to walk."    Pain:  Pain Related Behaviors Observed: no   Functional Pain Scale Rating 0-10: 2/10 and numbness  2/10 on average  0/10 at best  5/10 at worst  Location: legs  Description: tingling, " "burning, foot is sensitive  Aggravating Factors: bumping legs  Easing Factors: medicine, repositioning    He also c/o occasional R shoulder pain over the past couple of days.    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     Functional Limitations/Social History:    Prior Level of Function: Prior to surgery, pt was totally independent, limited cooking, eats breakfast everyday at the [email or web address] with friends. 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    Current Level of Function: currently using w/c for mobility in the home, but is doing t/f's without a device. He says the only thing he's really having difficulty with compared to pre-sx is "walking." Right LE "is more weak and out of control." No problems playing the guitar.    Home/Living environment : lives alone  Home Access: SSH, 0 ABEBE, tub/shower combo  DME: [employer], w/c, RW     Leisure: plays guitar and bass; out to eat with friends; exercise at the gym    Past Medical History/Physical Systems Review:     Past Medical History:  Raymundo Richard  has a past medical history of GERD (gastroesophageal reflux disease), Renal cell carcinoma, and Sleep apnea.    Past Surgical History:  Raymundo Richard  has a past surgical history that includes Hernia repair; Appendectomy; Nephrectomy; and Lumbar laminectomy (1/14/2020).    Current Medications:  Raymundo has a current medication list which includes the following prescription(s): amlodipine, dexamethasone, finasteride, hydrocodone-acetaminophen, insulin aspart u-100, oxycodone, oxycodone, pantoprazole, pantoprazole, pregabalin, and tamsulosin.    Allergies:  Review of patient's allergies indicates:   Allergen Reactions    Other Nausea And Vomiting     SODIUM PENATHOL  CAUSES SEVERE N & V       Objective     Cognitive Exam:  Alert & Oriented x4    Visual/Perceptual:  No concerns noted by pt. No deficits " noted in session. Will further assess as necessary.     Physical Exam:  Postural examination/scapula alignment: earline sh are abducted. R with slight lateral tilt and slight winging compared to the left.  No tenderness noted.    R sh flex=128a, L sh flex=134a    R UE: PROM ER@0*abd=66, ER@45*abd=82, ER@90*abd=84; SLIR=58  L UE: PROM ER@0*abd=66, ER@45*abd=82, ER@90*abd=95; SLIR=68    Distally, AROM is WFL earline UE.    Strength grossly 4/5 earline UE     Strength: (VALENCIA Dynamometer in lbs.) Average 3 trials, Position II:     (lbs) Left Right   1 76 79   2 85 76   3 86 81   avg 82.33 78.67   [norms for men aged 65-69: R=89.7 +/-20.4; L=76.8 +/-20.3 (Mathiowetz et al, 1985)]    Box and Block GMC assessment: (L) 49 (R) 52  [norms for men aged 65-69: R=68.4 +/-7.1; L=67.4 +/-7.8 (Mathiowetz et al, 1985)]    9 Hole Peg Test: (L) 34.94s (R) 36.22s  [norms for men aged 66-70: R=21.23s +/-3.29s; L=22.29s +/-3.71s (Florencio et al, 2003)]    Tone:  Modified Juwan Scale: 0 - No increase in muscle tone    Sensation:  Raymundo reports paraesthesias in earline LE, but no UE concerns.    Balance:   Static Sit - NORMAL: No deviations seen in posture held statically  Dynamic sit- NORMAL: No deviations seen in posture held statically  Static Stand - GOOD: Takes MODERATE challenges from all directions  Dynamic stand - please see PT eval    Endurance Deficit: moderate                    Functional Status      Functional Mobility: Mod I bed mobility, SBA sit<>stand    ADL's:  Mod I shoes/socks after set-up  Mod I shirt after set-up  CGA to stand and simulate BM hygiene- he states he's doing this without help at home, with external support.    IADL's:  Participation in IADLs is limited by decreased indep w/ standing.     Treatment     Home Exercises and Patient Education Provided    Education provided:   -role of OT, goals for OT, scheduling/cancellations, insurance limitations with patient.  -Additional Education provided: will initiate HEP at  next session.    Assessment     Raymundo Richard is a 69 y.o. male referred to outpatient occupational therapy and presents with a medical diagnosis of impaired mobility and ADLs. He has renal cell carcinoma, and is s/p lumbar laminectomy for excision of metastatic mass, resulting in decreased functional mobility. He also demonstrates intermittent pain and poor R shoulder mechanics w/ abduction, lateral tilting and mild winging of the R scapula. Not addressing this concern could lead to additional problems due to poor shoulder mechanics. Limitations are as described in the chart below. Following medical record review it is determined that pt will benefit from occupational therapy services in order to maximize pain free and/or functional use of the right UE for increased indep w/ IADLs, especially considering pt lives alone.    Pt prognosis is Good due to mild impairment at shoulder, motivation of pt.  Pt will benefit from skilled outpatient Occupational Therapy to address the deficits stated above and in the chart below, provide pt/family education, and to maximize pt's level of independence.     Plan of care discussed with patient: Yes  Pt's spiritual, cultural and educational needs considered and patient is agreeable to the plan of care and goals as stated below:     Anticipated Barriers for therapy: metastatic CA that he is not medically treating; possible transportation deficits as pt is relying on family    Medical Necessity is demonstrated by the following  Profile and History Assessment of Occupational Performance Level of Clinical Decision Making Complexity Score   Occupational Profile:   Raymundo Richard is a 69 y.o. male who lives alone. 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 Raymundo Richard has difficulty with  Meal prep, sweeping/ vacuuming/ mopping, yard-work, and other IADLs that require static and dynamic standing balance. This affects his daily functional abilities. His/her main goal  for therapy is to walk better.     Comorbidities:   Stage IV cancer    Medical and Therapy History Review:   Brief               Performance Deficits    Physical:  Joint Stability  Gross Motor Coordination  Fine Motor Coordination  Pain    Cognitive:  No Deficits    Psychosocial:    No Deficits     Clinical Decision Making:  low    Assessment Process:  Problem-Focused Assessments    Modification/Need for Assistance:  Not Necessary    Intervention Selection:  Limited Treatment Options       low  Based on PMHX, co morbidities , data from assessments and functional level of assistance required with task and clinical presentation directly impacting function.       The following goals were discussed with the patient and patient is in agreement with them as to be addressed in the treatment plan.     Goals:  Short Term Goals: 4 weeks   1) improve SLIR R UE to 65* or better to improve humeral alignment for proper shoulder mechanics.  2) Mod I with initial HEP.    Long Term Goals: 8 weeks   1) Raymundo will be able to stand and sweep with SBA to increase indep w/ IADLs.  2) pt will be indep w/ progressive HEP for R shoulder mechanics, allowing for biomechanically correct movement  3) Raymundo will be able to reach overhead to cabinet to retrieve 5 items to simulate necessary tasks for meal prep without demonstrating pain in the R shoulder.    Plan   Certification Period/Plan of care expiration: 2/20/2020 to 4/16/2020.    Outpatient Occupational Therapy 2 times weekly for 8 weeks to include the following interventions: Manual Therapy, Therapeutic Taping, Moist Heat/ Ice, Neuromuscular Re-ed, Patient Education, Self Care, Therapeutic Activites and Therapeutic Exercise.    Rissa Diop OT      I certify the need for these services furnished under this plan of treatment and while under my care.  ____________________________________ Physician/Referring Practitioner   Date of Signature

## 2020-02-20 NOTE — PROGRESS NOTES
[facility]  Physical Therapy Initial Evaluation    Name: Raymundo Richard  Clinic Number: [id number]    Therapy Diagnosis:   Encounter Diagnosis   Name Primary?    Gait abnormality Yes     Physician: Tejinder Foreman MD    Physician Orders: PT Eval and Treat   Medical Diagnosis from Referral: Impaired mobility.  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      Medical History:   Past Medical History:   Diagnosis Date    GERD (gastroesophageal reflux disease)     Renal cell carcinoma     Sleep apnea        Surgical History:   Raymundo Richard  has a past surgical history that includes Hernia repair; Appendectomy; Nephrectomy; and Lumbar laminectomy (N/A, 1/14/2020).    Medications:   Raymundo has a current medication list which includes the following prescription(s): amlodipine, dexamethasone, finasteride, hydrocodone-acetaminophen, insulin aspart u-100, oxycodone, oxycodone, pantoprazole, pantoprazole, pregabalin, and tamsulosin.    Allergies:   Review of patient's allergies indicates:   Allergen Reactions    Other Nausea And Vomiting     SODIUM PENATHOL  CAUSES SEVERE N & V        Imaging, MRI studies: see epic.    Prior Therapy: IP rehab in  and [place].  Social History: in 1 rich house lives alone  Occupation: Not working.  Prior Level of Function:  Independent  Current Level of Function: Using WC and RW for mobility.    Pain:  Current 1/10, worst 8/10, best0/10   Location: bilateral legs   Description: Aching, Dull, Sharp and Variable  Aggravating Factors: Standing, Bending, Extension, Flexing, Lifting and Getting out of bed/chair  Easing Factors: relaxation, pain medication and rest    Pts goals: walk without AD.    Objective     Hip  Right   Left  Pain/Dysfunction with Movement    AROM PROM MMT AROM PROM MMT    Flexion 70  3- 75  3+    Extension          Abduction 45  3+ WFL  3+    Adduction          Internal rotation WFL  3- WFL  3+    External rotation WFL  3+ WFL  3+      Knee  Right   Left  Pain/Dysfunction with Movement    AROM PROM MMT AROM PROM MMT    Flexion 120 140 3+ WFL  4-    Extension -25 0 2 WFL  4-      Both ankles;ROM and strength is WFL.  Transfers Epifanio with RW.  Gait;40' with RW with Epifanio X 1    Limitation/Restriction for FOTO 34 Survey    Therapist reviewed FOTO scores for Raymundo Richard on 2/20/2020.   FOTO documents entered into [employer] - see Media section.    Limitation Score: 60-80%  LEFS 14/80.       Education provided: POC discussed   -     Assessment   Raymundo is a 69 y.o. male referred to outpatient Physical Therapy with a medical diagnosis of impaired mobility. Pt presents with ROM and strength deficits,gait abnormality,decreased function due to pain and above listed deficits.    Pt prognosis is Good.   Pt will benefit from skilled outpatient Physical Therapy to address the deficits stated above and in the chart below, provide pt/family education, and to maximize pt's level of independence.     Plan of care discussed with patient: Yes  Pt's spiritual, cultural and educational needs considered and patient is agreeable to the plan of care and goals as stated below:     Anticipated Barriers for therapy: no.    Medical Necessity is demonstrated by the following  History  Co-morbidities and personal factors that may impact the plan of  care Co-morbidities:   history of cancer and prior lumbar surgery    Personal Factors:   no deficits     low   Examination  Body Structures and Functions, activity limitations and participation restrictions that may impact the plan of care Body Regions:   lower extremities    Body Systems:    gross symmetry  ROM  strength  gait    Participation Restrictions:   no    Activity limitations:   Learning and applying knowledge  no deficits    General Tasks and Commands  undertaking multiple tasks    Communication  no deficits    Mobility  lifting and carrying objects  moving around using equipment (WC)    Self care  no deficits    Domestic Life  cooking  assisting others    Interactions/Relationships  no deficits    Life Areas  no deficits    Community and Social Life  no deficits         low   Clinical Presentation stable and uncomplicated low   Decision Making/ Complexity Score: low     Goals:  Short Term Goals: 4 weeks   1.Decrease pain x 1 grade.  2.Start HEP.    Long Term Goals: 8 weeks   1.Pain 0-4/10.  2.Independent HEP.  3.ROM WFL/WNL  4.Strength 5/5.  5.Independent gait x 500' without AD.  6.LEFS 40/80.  7.Restore previous CLAUDIA.    Plan   Plan of care Certification: 2/20/2020 to 4/20/20..    Outpatient Physical Therapy 2 times weekly for 8 weeks to include the following interventions: Gait Training, Manual Therapy, Moist Heat/ Ice, Patient Education, Therapeutic Activites and Therapeutic Exercise.     Naga Colón, PT

## 2020-02-21 PROBLEM — Z78.9 IMPAIRED INSTRUMENTAL ACTIVITIES OF DAILY LIVING (IADL): Status: ACTIVE | Noted: 2020-01-01

## 2020-02-21 PROBLEM — M25.619 DECREASED RANGE OF MOTION (ROM) OF SHOULDER: Status: ACTIVE | Noted: 2020-01-01

## 2020-02-22 NOTE — PROGRESS NOTES
Occupational Therapy   Evaluation    Raymundo Richard   MRN: [id number]     OT eval complete. Please see attached POC for details. Thank you for consult!    LEE Hugo, DEVIKA  2/20/2020

## 2020-02-24 NOTE — PROGRESS NOTES
Physical Therapy Daily Treatment Note     Name: Raymundo Richard  Clinic Number: [id number]    Therapy Diagnosis:   Encounter Diagnosis   Name Primary?    Gait abnormality      Physician: Tejinder Foreman MD    Visit Date: 2/24/2020    Physician Orders: PT Eval and Treat   Medical Diagnosis from Referral: Impaired mobility.  Evaluation Date: 2/20/2020  Authorization Period Expiration: 12/31/20  Plan of Care Expiration: 4/20/20  Visit # / Visits authorized: 1/16    Time In: 1500  Time Out: 1555  Total Billable Time: 55 minutes    Precautions: Fall, cancer and no US    Subjective     Pt reports: Gets around home in WC without difficulties.  He has not received home exercise program.  Response to previous treatment: no problems  Functional change: none stated    Pain: 0/10    Objective     Raymundo received therapeutic exercises to develop strength, endurance and balance for 45 minutes including:    NuStep Lv1 12'  // bar stand x30: HR, mini squats, hip abd alt R/L  Shuttle 43# 6' B LP    Raymundo participated in gait training to improve functional mobility and safety for 10  minutes, including:    Amb with RW ' with WC, R knee buckle x2 with self correction    Home Exercises Provided and Patient Education Provided     Education provided:   - Cues for increased upright with stand activities.    Written Home Exercises Provided: yes.  Exercises were reviewed and Raymundo was able to demonstrate them prior to the end of the session.  Raymundo demonstrated good  understanding of the education provided.     See EMR under Media for exercises provided 2/24/2020.    Assessment     Patient tolerated activities with rest breaks and CGA for safety.    Raymundo is progressing towards his goals.   Pt prognosis is Good.     Pt will continue to benefit from skilled outpatient physical therapy to address the deficits listed in the problem list box on initial evaluation, provide pt/family education and to maximize pt's level of  independence in the home and community environment.     Pt's spiritual, cultural and educational needs considered and pt agreeable to plan of care and goals.     Anticipated barriers to physical therapy: none    Goals:     Short Term Goals: 4 weeks   1.Decrease pain x 1 grade.  2.Start HEP.     Long Term Goals: 8 weeks   1.Pain 0-4/10.  2.Independent HEP.  3.ROM WFL/WNL  4.Strength 5/5.  5.Independent gait x 500' without AD.  6.LEFS 40/80.  7.Restore previous CLAUDIA.    Plan     Continue with POC to increase activity endurance as patient tolerates.    Rebeca Llanes, PTA

## 2020-02-25 NOTE — PROGRESS NOTES
Occupational Therapy Daily Treatment Note     Date: 2/24/2020  Name: Raymundo Richard  Clinic Number: [id number]    Therapy Diagnosis:   Encounter Diagnoses   Name Primary?    Decreased range of motion of right shoulder Yes    Impaired instrumental activities of daily living (IADL)      Physician: Tejinder Foreman MD    Physician Orders: evaluate and treat  Medical Diagnosis: metastatic renal cancer  Surgical Procedure and Date:   Evaluation Date:   Insurance Authorization Period Expiration:   Plan of Care Certification Period: eval - 12-  Date of Return to MD: na    Visit # / Visits authorized: 2 /   Time In:1105  Time Out: 1158  Total Billable Time: 53 minutes    Precautions:  Fall      Subjective     Pt reports: Feels well, no pain.  he was compliant with home exercise program given last session.   Response to previous treatment:na  Functional change: none    Pain: 0/10  Location: right shoulder      Objective       Raymundo received therapeutic exercises for 55 minutes including:  -review and demonstration of HEP  -Theraband exercises all planes red/green 3 sets 12  - Therapy bar 8# for 3 sets 12 overhead B UE        Home Exercises and Education Provided     Education provided:   - HEP  - Progress towards goals     Written Home Exercises Provided: Patient instructed to cont prior HEP.  Exercises were reviewed and Raymundo was able to demonstrate them prior to the end of the session.  Raymundo demonstrated good  understanding of the HEP provided.   .   See EMR under Patient Instructions for exercises provided prior visit.        Assessment     Pt would continue to benefit from skilled OT.      Raymundo is progressing well towards his goals and there are no updates to goals at this time. Pt prognosis is Fair.     Pt will continue to benefit from skilled outpatient occupational therapy to address the deficits listed in the problem list on initial evaluation provide pt/family education and to maximize pt's level of  independence in the home and community environment.     Anticipated barriers to occupational therapy: metastatic disease    Pt's spiritual, cultural and educational needs considered and pt agreeable to plan of care and goals.    Goals:  See initial plan of care    Plan     Updates/Grading for next session: continue with plan of care      DEVIKA Flores

## 2020-02-28 NOTE — PROGRESS NOTES
"  Physical Therapy Daily Treatment Note     Name: Raymundo Richard  Clinic Number: [id number]    Therapy Diagnosis:   Encounter Diagnosis   Name Primary?    Gait abnormality      Physician: Tejinder Foreman MD    Visit Date: 2/28/2020    Physician Orders: PT Eval and Treat   Medical Diagnosis from Referral: Impaired mobility.  Evaluation Date: 2/20/2020  Authorization Period Expiration: 12/31/20  Plan of Care Expiration: 4/20/20  Visit # / Visits authorized: 2/16    Time In: 1315  Time Out: 1400  Total Billable Time: 45 minutes    Precautions: Fall and cancer    Subjective     Pt reports: Doing well with mobility at home with WC.  He reports was compliant with home exercise program.  Response to previous treatment: No problems  Functional change: none stated    Pain: 0/10    Objective     Raymundo received therapeutic exercises to develop strength and endurance for 25 minutes including:    NuStep Lv2 12'  Shuttle 50# B LP 2x20  // bar: HR/TR x30   Mini squats 2x15   Step Up 6" box x10 R/L    Raymundo participated in gait training to improve functional mobility and safety for 20  minutes, including:    Gait with RW 2x150' CGA with WC to follow, knee buckling x3 with self correction  // bar tandem gait 40'    Home Exercises Provided and Patient Education Provided     Education provided:   - Increased range with decreased speed    Written Home Exercises Provided: Patient instructed to cont prior HEP.  Exercises were reviewed and aRymundo was able to demonstrate them prior to the end of the session.  Raymundo demonstrated good  understanding of the education provided.     See EMR under Media for exercises provided prior visit.    Assessment     Patient tolerated activities with rest breaks.    Raymundo is progressing well towards his goals.   Pt prognosis is Good.     Pt will continue to benefit from skilled outpatient physical therapy to address the deficits listed in the problem list box on initial evaluation, provide pt/family " education and to maximize pt's level of independence in the home and community environment.     Pt's spiritual, cultural and educational needs considered and pt agreeable to plan of care and goals.     Anticipated barriers to physical therapy: none    Goals:     Short Term Goals: 4 weeks   1.Decrease pain x 1 grade.  2.Start HEP.     Long Term Goals: 8 weeks   1.Pain 0-4/10.  2.Independent HEP.  3.ROM WFL/WNL  4.Strength 5/5.  5.Independent gait x 500' without AD.  6.LEFS 40/80.  7.Restore previous CLAUDIA.       Plan     Continue with POC to increase activities as patient tolerates.    Rebeca Llanes, PTA

## 2020-02-28 NOTE — PROGRESS NOTES
Occupational Therapy Daily Treatment Note     Date: 2/28/2020  Name: Raymundo Richard  Clinic Number: [id number]    Therapy Diagnosis:   Encounter Diagnoses   Name Primary?    Impaired mobility and ADLs Yes    Impaired instrumental activities of daily living (IADL)      Physician: Tejinder Foreman MD    Physician Orders: evaluate and treat  Medical Diagnosis: metastatic renal cancer  Surgical Procedure and Date:   Evaluation Date:   Insurance Authorization Period Expiration:   Plan of Care Certification Period: eval - 12-  Date of Return to MD: na    Visit # / Visits authorized: 3 /   Time In:1415  Time Out: 1500  Total Billable Time: 45 minutes    Precautions:  Fall      Subjective     Pt reports: Feels well, no pain.  he was compliant with home exercise program given last session.   Response to previous treatment:na  Functional change: none    Pain: 0/10  Location: right shoulder      Objective       Raymundo received therapeutic exercises for 55 minutes including:  - instructed patient in protocol for band exercises to improve scapular stability, er, ir, shouldwer extension with scapular stability, scapular retraction.  Written instructions given. All exercises demonstrated to patient 2x and to daughter 1x. Expressed understanding.            Home Exercises and Education Provided     Education provided:   - HEP  - Progress towards goals     Written Home Exercises Provided: Patient instructed to cont prior HEP.  Exercises were reviewed and Raymundo was able to demonstrate them prior to the end of the session.  Raymundo demonstrated good  understanding of the HEP provided.   .   See EMR under Patient Instructions for exercises provided prior visit.        Assessment     Pt would continue to benefit from skilled OT.      Raymundo is progressing well towards his goals and there are no updates to goals at this time. Pt prognosis is good.     Pt will continue to benefit from skilled outpatient occupational therapy to  address the deficits listed in the problem list on initial evaluation provide pt/family education and to maximize pt's level of independence in the home and community environment.     Anticipated barriers to occupational therapy: metastatic disease    Pt's spiritual, cultural and educational needs considered and pt agreeable to plan of care and goals.    Goals:  See initial plan of care    Plan     Updates/Grading for next session: continue with plan of care      DEVIKA Flores

## 2020-03-02 NOTE — PROGRESS NOTES
"  Physical Therapy Daily Treatment Note     Name: Raymundo Richard  Clinic Number: [id number]    Therapy Diagnosis:   Encounter Diagnosis   Name Primary?    Gait abnormality      Physician: Tejinder Foreman MD    Visit Date: 3/2/2020    Physician Orders: PT Eval and Treat   Medical Diagnosis from Referral: Impaired mobility.  Evaluation Date: 2/20/2020  Authorization Period Expiration: 12/31/20  Plan of Care Expiration: 4/20/20  Visit # / Visits authorized: 3/16    Time In: 1400  Time Out: 1455  Total Billable Time: 55 minutes    Precautions: Fall and history of cancer     Subjective     Pt reports: Doing well at home with WC mobility. Daughter present requesting to watch what patient is doing in therapy.  He was compliant with home exercise program.  Response to previous treatment: no problems  Functional change: none stated    Pain: 0/10    Objective     Raymundo received therapeutic exercises to develop strength and endurance for 30 minutes including:    NuStep Lv3 12'  Shuttle 50# B LP 5'    // bar:   HR/TR Airex x30              Mini squats Airex 2x15              Step Up 6" box 2x10 R/L     Raymundo participated in gait training to improve functional mobility and safety for 25  minutes, including:     Gait training with RW CGA>SBA 2x200' even and uneven surface  Tandem gait // bars 40'    Home Exercises Provided and Patient Education Provided     Education provided:   - Increased head up with VC    Written Home Exercises Provided: Patient instructed to cont prior HEP.  Exercises were reviewed and Raymundo was able to demonstrate them prior to the end of the session.  Raymundo demonstrated good  understanding of the education provided.     See EMR under Patient Instructions for exercises provided prior visit.    Assessment     Patient tolerated activities with rest breaks.    Raymundo is progressing well towards his goals.   Pt prognosis is Good.     Pt will continue to benefit from skilled outpatient physical therapy to " address the deficits listed in the problem list box on initial evaluation, provide pt/family education and to maximize pt's level of independence in the home and community environment.     Pt's spiritual, cultural and educational needs considered and pt agreeable to plan of care and goals.     Anticipated barriers to physical therapy: none    Goals:     Short Term Goals: 4 weeks   1.Decrease pain x 1 grade.  2.Start HEP.     Long Term Goals: 8 weeks   1.Pain 0-4/10.  2.Independent HEP.  3.ROM WFL/WNL  4.Strength 5/5.  5.Independent gait x 500' without AD.  6.LEFS 40/80.  7.Restore previous CLAUDIA.    Plan     Continue with POC to increase mobility and stand balance ex as patient tolerates.    Rebeca Llanes, PTA

## 2020-03-02 NOTE — PROGRESS NOTES
CHIEF COMPLAINT:  Post op evaluation 6 weeks after L2-3 and L4-5 laminectomy for intradural mass    I, Sj Hernandez, attest that this documentation has been prepared under the direction and in the presence of Zackery Olivares MD.    HPI:  Raymundo Richard is a 69 y.o.  male, who presents today for 6 week post op evaluation. Pt is s/p L2-3 and L4-5 laminectomy for intradural mass resection on 2020. Pt reports that he is able to ambulate with a walker. Pt's walking difficulty is related more to balance difficulty. He has been feeling well and notes some pain in his left buttock. His shooting leg pain has improved significantly. He notes numbness in the dorsal aspect of his bilateral feet since surgery. Pt has control over his bladder and bowels. Pt has not seen Oncology for the treatment options. Pt presents today in a wheelchair but can use a walker.       Review of patient's allergies indicates:   Allergen Reactions    Other Nausea And Vomiting     SODIUM PENATHOL  CAUSES SEVERE N & V       Past Medical History:   Diagnosis Date    GERD (gastroesophageal reflux disease)     Renal cell carcinoma     Sleep apnea      Past Surgical History:   Procedure Laterality Date    APPENDECTOMY      HERNIA REPAIR      LUMBAR LAMINECTOMY N/A 2020    Procedure: L2-3 laminectomy for resection of intradural mass and L4-5 laminectomy; Microscope and micro instruments and neuromonitoring.  No instrumentation hardware;  Surgeon: Zackery Olivares MD;  Location: [facility] OR [address] [place] [address];  Service: Neurosurgery;  Laterality: N/A;    NEPHRECTOMY       History reviewed. No pertinent family history.  Social History     Tobacco Use    Smoking status: Former Smoker     Types: Cigarettes     Last attempt to quit: 1978     Years since quittin.1    Smokeless tobacco: Never Used   Substance Use Topics    Alcohol use: No    Drug use: No        Review of Systems   Constitutional: Negative.    HENT: Negative.    Eyes: Negative.     Respiratory: Negative.    Cardiovascular: Negative.    Gastrointestinal: Negative.    Endocrine: Negative.    Genitourinary: Negative.    Musculoskeletal: Positive for myalgias (left buttock). Negative for back pain, gait problem and neck pain.   Skin: Negative.    Allergic/Immunologic: Negative.    Neurological: Positive for numbness (bilateral dorsal feet). Negative for weakness, light-headedness and headaches.   Hematological: Negative.    Psychiatric/Behavioral: Negative.        OBJECTIVE:   Vital Signs:  Pulse: 92 (03/02/20 1141)  BP: 120/60 (03/02/20 1141)    Physical Exam:    Vital signs: All nursing notes and vital signs reviewed -- afebrile, vital signs stable.  Constitutional: Patient sitting comfortably in chair. Appears well developed and well nourished.  Skin: Exposed areas are intact without abnormal markings, rashes or other lesions. Well healed lumbar incision.  HEENT: Normocephalic. Normal conjunctivae.  Cardiovascular: Normal rate and regular rhythm.  Respiratory: Chest wall rises and falls symmetrically, without signs of respiratory distress.  Abdomen: Soft and non-tender.  Extremities: Warm and without edema. Calves supple, non-tender.  Psych/Behavior: Normal affect.    Neurological:    Mental status: Alert and oriented. Conversational and appropriate.       Cranial Nerves: Grossly intact.     Motor:    Upper:  Deltoids Triceps Biceps WE WF     R 5/5 5/5 5/5 5/5 5/5 5/5    L 5/5 5/5 5/5 5/5 5/5 5/5      Lower:  HF KE KF DF PF EHL    R 5/5 4/5 5/5 4/5 4/5 5/5    L 5/5 5/5 5/5 5/5 5/5 5/5     Sensory: Intact sensation to light touch in all extremities. Romberg negative.    Reflexes:          DTR: 2+ symmetrically throughout.     Hough's: Negative.     Babinski's: Negative.     Clonus: Negative.    Cerebellar: Finger-to-nose and rapid alternating movements normal. Gait stable, fluid.    Spine:    Posture: Head well aligned over pelvis in front and side views.  No focal or global spinal  deformity visible on inspection. Shoulders and hips even. No obvious leg length discrepancy. No scapula winging.    Bending: Full ROM with forward, back and lateral bending. No rib prominence with forward bend.    Cervical:      ROM: Full with flexion, extension, lateral rotation and ear-to-shoulder bend.      Midline TTP: Negative.     Spurling's test: Negative.     Lhermitte's: Negative.    Thoracic:     Midline TTP: Negative    Lumbar:     Midline TTP: Negative     Straight Leg Test: Negative     Crossed Straight Leg Test: Negative     Sciatic notch tenderness: Negative.    Other:     SI joint TTP: Negative.     Greater trochanter TTP: Negative.     Tenderness with external/internal hip rotation: Negative.    Diagnostic Results:  All imaging was independently reviewed by me.    No new imaging for my review.      ASSESSMENT/PLAN:     Raymundo Richard is doing well after removal of metastatic renal cell at intradural L2. We will see him back in 3 months with a new MRI L spine    The patient understands and agrees with the plan of care. All questions were answered.     1. RTC 3 months with MRI L-spine       I, Dr. Zackery Olivares personally performed the services described in this documentation. All medical record entries made by the scribe, Sj Hernandez, were at my direction and in my presence.  I have reviewed the chart and agree that the record reflects my personal performance and is accurate and complete.      Zackery Olivares M.D.  Department of Neurosurgery  [facility]      .

## 2020-03-04 NOTE — PROGRESS NOTES
"  Physical Therapy Daily Treatment Note     Name: Raymundo Richard  Clinic Number: [id number]    Therapy Diagnosis:   Encounter Diagnosis   Name Primary?    Gait abnormality      Physician: Tejinder Foreman MD    Visit Date: 3/4/2020    Physician Orders: PT Eval and Treat   Medical Diagnosis from Referral: Impaired mobility.  Evaluation Date: 2/20/2020  Authorization Period Expiration: 12/31/20  Plan of Care Expiration: 4/20/20  Visit # / Visits authorized: 4/16    Time In: 1200  Time Out: 1300  Total Billable Time: 60 minutes    Precautions: Fall and history of cancer     Subjective     Pt reports: No pain. Lives alone. Has people drive him to clinic. 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  He was compliant with home exercise program.  Response to previous treatment: no problems  Functional change: none stated    Pain: 0/10    Objective     Raymundo received therapeutic exercises to develop strength and endurance for 25 minutes including:    NuStep Lv3 10'  Seated clams green tband 10/2    // bar:   HR/TR Airex x30              Mini squats Airex 3x10               Raymundo participated in gait training to improve functional mobility and safety for 35 minutes, including:     Gait training with RW CGA 2x200' even uneven surfaces (raining outside) with VC for improved posture, increased B LE clearance, increased step width  Tandem gait // bars 2 x 10' fwd  Backward walking 2 x 10' in // bars  Sidestepping L/R 2 x 10' L/R in // bars  Stair negotiation 6-4" steps with CGA with B rails and LE step-to pattern in favor of L LE and vc for technique and sequencing, safety    Home Exercises Provided and Patient Education Provided     Education provided:   - Look ahead with walking for posture and safety  - Pick your feet up higher when you step to reduce the risk of tripping.   - Use your hands when you get up and down from the bed, w/c etc to ensure safe transfers.     Written Home Exercises " Provided: Patient instructed to cont prior HEP.  Exercises were reviewed and Raymundo was able to demonstrate them prior to the end of the session.  Raymundo demonstrated good  understanding of the education provided.     See EMR under Patient Instructions for exercises provided prior visit.    Assessment     Good tolerance for activities with pacing between standing and sitting acts. Progressing with increased balance challenges. Eager to progress. R knee hyperextension with stance but glynn when attempting to prevent it.     Raymundo is progressing well towards his goals.   Pt prognosis is Good.     Pt will continue to benefit from skilled outpatient physical therapy to address the deficits listed in the problem list box on initial evaluation, provide pt/family education and to maximize pt's level of independence in the home and community environment.     Pt's spiritual, cultural and educational needs considered and pt agreeable to plan of care and goals.     Anticipated barriers to physical therapy: none    Goals:     Short Term Goals: 4 weeks   1.Decrease pain x 1 grade.  2.Start HEP.     Long Term Goals: 8 weeks   1.Pain 0-4/10.  2.Independent HEP.  3.ROM WFL/WNL  4.Strength 5/5.  5.Independent gait x 500' without AD.  6.LEFS 40/80.  7.Restore previous CLAUDIA.    Plan     Continue with POC to increase mobility and stand balance ex as patient tolerates.    Sara Boyer, PTA

## 2020-03-04 NOTE — PATIENT INSTRUCTIONS
- Look ahead with walking for posture and safety  - Pick your feet up higher when you step to reduce the risk of tripping.   - Use your hands when you get up and down from the bed, w/c etc to ensure safe transfers.

## 2020-03-09 NOTE — PROGRESS NOTES
"  Physical Therapy Daily Treatment Note     Name: Raymundo Richard  Clinic Number: [id number]    Therapy Diagnosis:   Encounter Diagnosis   Name Primary?    Gait abnormality      Physician: Tejinder Foreman MD    Visit Date: 3/9/2020    Physician Orders: PT Eval and Treat   Medical Diagnosis from Referral: Impaired mobility.  Evaluation Date: 2/20/2020  Authorization Period Expiration: 12/31/20  Plan of Care Expiration: 4/20/20  Visit # / Visits authorized: 5/16    Time In: 1105  Time Out: 1200  Total Billable Time: 55 minutes    Precautions: history of cancer, no US    Subjective     Pt reports: Using WC at all times at home. Patient requesting to do UE exercises as well.  He reports was compliant with home exercise program.  Response to previous treatment: no problems  Functional change: improved mobility    Pain: 0/10    Objective     Raymundo received therapeutic exercises to develop strength, endurance and balance for 40 minutes including:    NuStep Lv6 with UE's 12'  Shuttle 56# B LP 2x20,   // bar:   HR/TR Airex x30              Step Up 6" box x20 R/L     Raymundo participated in gait training to improve functional mobility and safety for 15  minutes, including:     Gait training with RW ' even and uneven surface, Up/Dwn ramp  Tandem gait // bars 40'    Home Exercises Provided and Patient Education Provided     Education provided:   - Increased attention ro R knee during extension to decrease hyperextension    Written Home Exercises Provided: Patient instructed to cont prior HEP.  Exercises were reviewed and Raymundo was able to demonstrate them prior to the end of the session.  Raymundo demonstrated good  understanding of the education provided.     See EMR under Patient Instructions for exercises provided prior visit.    Assessment     Patient tolerated activities with rest breaks.    Raymundo is progressing towards his goals.   Pt prognosis is Good.     Pt will continue to benefit from skilled outpatient " physical therapy to address the deficits listed in the problem list box on initial evaluation, provide pt/family education and to maximize pt's level of independence in the home and community environment.     Pt's spiritual, cultural and educational needs considered and pt agreeable to plan of care and goals.     Anticipated barriers to physical therapy: none    Goals:     Short Term Goals: 4 weeks   1.Decrease pain x 1 grade.  2.Start HEP.     Long Term Goals: 8 weeks   1.Pain 0-4/10.  2.Independent HEP.  3.ROM WFL/WNL  4.Strength 5/5.  5.Independent gait x 500' without AD.  6.LEFS 40/80.  7.Restore previous CLAUDIA.    Plan     Continue with POC to increase gait endurance as patient tolerates.    Rebeca Llanes, PTA

## 2020-03-19 NOTE — PATIENT INSTRUCTIONS
Outpatient Therapy Discharge Summary     Name: Raymundo Richard  [facility] Number: [id number]    Therapy Diagnosis:   Encounter Diagnoses   Name Primary?    Impaired mobility and ADLs Yes    Impaired instrumental activities of daily living (IADL)      Physician: Tejinder Foreman MD    Physician Orders: Evaluate and treat impaired ADL  Medical Diagnosis: metastatic kidney CA  Evaluation Date:       Date of Last visit: 2-  Total Visits Received:   Cancelled Visits: 1  No Show Visits: 0    Assessment    Goals: Patient is living alone and mod I for home management and ADL. All goals met.    Discharge reason: Patient has met all of his/her goals and Patient requested discharge    Plan   This patient is discharged from Occupational Therapy

## 2020-04-22 NOTE — TELEPHONE ENCOUNTER
Spoke with Dayami, patient's daughter, who reports patient has been in a lot of pain after he had tumor removed from spine 2 months ago.  She reports that doctor on call for Heme/Onc when patient was last in hospital told them to call back when it gets to this level of severity, as he opted out of chemo.  Daughter reports Next f/u with neurosurgeon is not until June, but she has a call in with the neurosurgeon now and is hoping to get a sooner appt.      Last visit was in March 2019.  Last MRI of spine January 2019 (in epic).    Please advise.  The daughter is on board with pain management referral should that be appropriate.      ----- Message from Ray Bowers sent at 4/22/2020  9:32 AM CDT -----  Contact: Dtr/Dayami Stock called in regarding patient (dad).  Dayami stated patient did not have his treatments with Dr. Anna but when seen on 3/8/19 was told to call office when patient started to be in pain.      Dayami would like a call back from office at [phone]

## 2020-04-22 NOTE — TELEPHONE ENCOUNTER
----- Message from Noe Wallace sent at 4/22/2020  9:23 AM CDT -----  Contact: katty- daughter   pts daughter called asking for a call back, in regards to rescheduling the pts 06/15 to an earlier date.  She stated that the pt is in a lot of pain and needs to see the doctor sooner.       Contact [facility]- [phone]

## 2020-04-22 NOTE — TELEPHONE ENCOUNTER
Spoke with patient's daughter who reports they have seen PCP and have elected hospice at this time because patient's pain is so severe.

## 2020-04-22 NOTE — TELEPHONE ENCOUNTER
Spoke with patient daughter Dayami who states patient is having lower back and hip pain 8 out of 10. She also states patient have no appetite , dark urine and looks clammy, denies fever, nausea, vomiting. She asked if patient can be seen sooner. Inform her daughter Dayami due to fathers Dx. She need to reach out to his Oncologist regarding patient systems and I will speak with he PA regarding scheduling patient scans sooner. Patient scheduled in June for scans and office visit with Dr. Olivares. Patient daughter Dayami verbalized understanding. KJ

## 2020-06-02 NOTE — TELEPHONE ENCOUNTER
Attempted to reach pt to confirm his appts scheduled for 06.12.2020 and 06.15.2020.  LM w/father requesting ar return call.

## 2020-06-12 NOTE — TELEPHONE ENCOUNTER
Daughter states the pt is currently under the care of Hospice and requested to cancel upcoming appts w/Dr. Olivares.  Wished the family well and V/U.

## 2024-06-13 NOTE — NURSING
7 pm Pt arrived on NSU unit accompanied by EMS staff.  AAOX4.  Following commands.  No SOB.  No falls.  Skin intact.  WCTM.   1

## (undated) DEVICE — DRESSING AQUACEL FOAM 5 X 5

## (undated) DEVICE — DRESSING MEPILEX BORDER 4 X 4

## (undated) DEVICE — SUT VICRYL PLUS 2-0 CT1 18

## (undated) DEVICE — DRAPE C ARM 42 X 120 10/BX

## (undated) DEVICE — KIT SURGIFLO HEMOSTATIC MATRIX

## (undated) DEVICE — BLADE 4IN EDGE INSULATED

## (undated) DEVICE — Device

## (undated) DEVICE — DRAPE ABDOMINAL TIBURON 14X11

## (undated) DEVICE — SPONGE NEURO 1/4X1/4

## (undated) DEVICE — DRESSING AQUACEL FOAM 4 X 8

## (undated) DEVICE — SEE MEDLINE ITEM 157150

## (undated) DEVICE — SPONGE PATTY SURGICAL .5X3IN

## (undated) DEVICE — SUT VICRYL PLUS 0 CT1 18IN

## (undated) DEVICE — SEE MEDLINE ITEM 156905

## (undated) DEVICE — CARTRIDGE OIL

## (undated) DEVICE — HEMOSTAT SURGICEL 4X8IN

## (undated) DEVICE — DIFFUSER

## (undated) DEVICE — CLIPPER BLADE MOD 4406 (CAREF)

## (undated) DEVICE — KIT SPINAL PATIENT CARE JACK

## (undated) DEVICE — DRAPE STERI-DRAPE 1000 17X11IN

## (undated) DEVICE — BUR BONE CUT MICRO TPS 3X3.8MM

## (undated) DEVICE — TUBE FRAZIER 5MM 2FT SOFT TIP

## (undated) DEVICE — ELECTRODE REM PLYHSV RETURN 9

## (undated) DEVICE — CORD BIPOLAR 12 FOOT

## (undated) DEVICE — SPONGE LAP 4X18 PREWASHED

## (undated) DEVICE — HEMOSTAT SURGICEL NU-KNIT 6X9

## (undated) DEVICE — SEE MEDLINE ITEM 146292

## (undated) DEVICE — DRESSING SURGICAL 1/2X1/2

## (undated) DEVICE — KIT FIBRIN SEALANT EVICEL 5 ML

## (undated) DEVICE — KIT EVACUATOR 3-SPRING 1/8 DRN